# Patient Record
Sex: FEMALE | Race: WHITE | Employment: OTHER | URBAN - METROPOLITAN AREA
[De-identification: names, ages, dates, MRNs, and addresses within clinical notes are randomized per-mention and may not be internally consistent; named-entity substitution may affect disease eponyms.]

---

## 2017-08-17 PROBLEM — E66.9 MODERATE OBESITY: Status: ACTIVE | Noted: 2017-08-17

## 2017-08-17 PROBLEM — E66.8 MODERATE OBESITY: Status: ACTIVE | Noted: 2017-08-17

## 2017-08-18 PROBLEM — J44.9 CHRONIC OBSTRUCTIVE PULMONARY DISEASE (COPD) (HCC): Status: ACTIVE | Noted: 2017-08-18

## 2018-04-12 ENCOUNTER — PREP FOR PROCEDURE (OUTPATIENT)
Dept: SURGERY | Age: 58
End: 2018-04-12

## 2018-04-12 ENCOUNTER — TELEPHONE (OUTPATIENT)
Dept: SURGERY | Age: 58
End: 2018-04-12

## 2018-04-12 ENCOUNTER — INITIAL CONSULT (OUTPATIENT)
Dept: SURGERY | Age: 58
End: 2018-04-12
Payer: MEDICARE

## 2018-04-12 VITALS
HEART RATE: 70 BPM | TEMPERATURE: 98.8 F | WEIGHT: 236 LBS | SYSTOLIC BLOOD PRESSURE: 112 MMHG | RESPIRATION RATE: 12 BRPM | DIASTOLIC BLOOD PRESSURE: 62 MMHG | HEIGHT: 69 IN | BODY MASS INDEX: 34.96 KG/M2

## 2018-04-12 DIAGNOSIS — R10.13 EPIGASTRIC PAIN: Primary | ICD-10-CM

## 2018-04-12 PROCEDURE — G8427 DOCREV CUR MEDS BY ELIG CLIN: HCPCS | Performed by: SURGERY

## 2018-04-12 PROCEDURE — 3014F SCREEN MAMMO DOC REV: CPT | Performed by: SURGERY

## 2018-04-12 PROCEDURE — 4004F PT TOBACCO SCREEN RCVD TLK: CPT | Performed by: SURGERY

## 2018-04-12 PROCEDURE — G8417 CALC BMI ABV UP PARAM F/U: HCPCS | Performed by: SURGERY

## 2018-04-12 PROCEDURE — 99204 OFFICE O/P NEW MOD 45 MIN: CPT | Performed by: SURGERY

## 2018-04-12 PROCEDURE — 3017F COLORECTAL CA SCREEN DOC REV: CPT | Performed by: SURGERY

## 2018-04-12 RX ORDER — CELECOXIB 100 MG/1
CAPSULE ORAL
Refills: 0 | COMMUNITY
Start: 2018-04-06 | End: 2019-07-29

## 2018-04-12 RX ORDER — SODIUM CHLORIDE, SODIUM LACTATE, POTASSIUM CHLORIDE, CALCIUM CHLORIDE 600; 310; 30; 20 MG/100ML; MG/100ML; MG/100ML; MG/100ML
INJECTION, SOLUTION INTRAVENOUS CONTINUOUS
Status: CANCELLED | OUTPATIENT
Start: 2018-04-12

## 2018-04-12 RX ORDER — 0.9 % SODIUM CHLORIDE 0.9 %
10 VIAL (ML) INJECTION PRN
Status: CANCELLED | OUTPATIENT
Start: 2018-04-12

## 2018-04-12 RX ORDER — AMLODIPINE BESYLATE AND BENAZEPRIL HYDROCHLORIDE 5; 40 MG/1; MG/1
CAPSULE ORAL
Refills: 0 | COMMUNITY
Start: 2018-04-06 | End: 2021-07-13 | Stop reason: ALTCHOICE

## 2018-04-12 RX ORDER — 0.9 % SODIUM CHLORIDE 0.9 %
10 VIAL (ML) INJECTION EVERY 12 HOURS SCHEDULED
Status: CANCELLED | OUTPATIENT
Start: 2018-04-12

## 2018-04-13 ENCOUNTER — TELEPHONE (OUTPATIENT)
Dept: SURGERY | Age: 58
End: 2018-04-13

## 2018-04-16 RX ORDER — TIOTROPIUM BROMIDE INHALATION SPRAY 3.12 UG/1
SPRAY, METERED RESPIRATORY (INHALATION)
Refills: 5 | COMMUNITY
Start: 2018-03-01 | End: 2019-07-29

## 2018-04-18 ENCOUNTER — HOSPITAL ENCOUNTER (OUTPATIENT)
Age: 58
Setting detail: OUTPATIENT SURGERY
Discharge: HOME | End: 2018-04-18
Attending: SURGERY | Admitting: SURGERY
Payer: MEDICARE

## 2018-04-18 ENCOUNTER — ANESTHESIA EVENT (OUTPATIENT)
Dept: ENDOSCOPY | Age: 58
End: 2018-04-18
Payer: MEDICARE

## 2018-04-18 ENCOUNTER — ANESTHESIA (OUTPATIENT)
Dept: ENDOSCOPY | Age: 58
End: 2018-04-18
Payer: MEDICARE

## 2018-04-18 VITALS
HEART RATE: 64 BPM | WEIGHT: 234 LBS | TEMPERATURE: 97.5 F | DIASTOLIC BLOOD PRESSURE: 76 MMHG | OXYGEN SATURATION: 97 % | RESPIRATION RATE: 16 BRPM | SYSTOLIC BLOOD PRESSURE: 150 MMHG | HEIGHT: 69 IN | BODY MASS INDEX: 34.66 KG/M2

## 2018-04-18 VITALS
SYSTOLIC BLOOD PRESSURE: 149 MMHG | DIASTOLIC BLOOD PRESSURE: 94 MMHG | OXYGEN SATURATION: 99 % | RESPIRATION RATE: 16 BRPM

## 2018-04-18 PROCEDURE — 88305 TISSUE EXAM BY PATHOLOGIST: CPT

## 2018-04-18 PROCEDURE — C1773 RET DEV, INSERTABLE: HCPCS | Performed by: SURGERY

## 2018-04-18 PROCEDURE — 7100000010 HC PHASE II RECOVERY - FIRST 15 MIN: Performed by: SURGERY

## 2018-04-18 PROCEDURE — 3700000001 HC ADD 15 MINUTES (ANESTHESIA): Performed by: SURGERY

## 2018-04-18 PROCEDURE — 6360000002 HC RX W HCPCS: Performed by: NURSE ANESTHETIST, CERTIFIED REGISTERED

## 2018-04-18 PROCEDURE — 88342 IMHCHEM/IMCYTCHM 1ST ANTB: CPT

## 2018-04-18 PROCEDURE — 3700000000 HC ANESTHESIA ATTENDED CARE: Performed by: SURGERY

## 2018-04-18 PROCEDURE — 3609012400 HC EGD TRANSORAL BIOPSY SINGLE/MULTIPLE: Performed by: SURGERY

## 2018-04-18 PROCEDURE — 7100000011 HC PHASE II RECOVERY - ADDTL 15 MIN: Performed by: SURGERY

## 2018-04-18 PROCEDURE — 43239 EGD BIOPSY SINGLE/MULTIPLE: CPT | Performed by: SURGERY

## 2018-04-18 PROCEDURE — 2709999900 HC NON-CHARGEABLE SUPPLY: Performed by: SURGERY

## 2018-04-18 PROCEDURE — 2580000003 HC RX 258: Performed by: SURGERY

## 2018-04-18 PROCEDURE — 99024 POSTOP FOLLOW-UP VISIT: CPT | Performed by: SURGERY

## 2018-04-18 RX ORDER — PROPOFOL 10 MG/ML
INJECTION, EMULSION INTRAVENOUS PRN
Status: DISCONTINUED | OUTPATIENT
Start: 2018-04-18 | End: 2018-04-18 | Stop reason: SDUPTHER

## 2018-04-18 RX ORDER — SODIUM CHLORIDE, SODIUM LACTATE, POTASSIUM CHLORIDE, CALCIUM CHLORIDE 600; 310; 30; 20 MG/100ML; MG/100ML; MG/100ML; MG/100ML
INJECTION, SOLUTION INTRAVENOUS CONTINUOUS
Status: DISCONTINUED | OUTPATIENT
Start: 2018-04-18 | End: 2018-04-18 | Stop reason: HOSPADM

## 2018-04-18 RX ORDER — SODIUM CHLORIDE 0.9 % (FLUSH) 0.9 %
10 SYRINGE (ML) INJECTION PRN
Status: DISCONTINUED | OUTPATIENT
Start: 2018-04-18 | End: 2018-04-18 | Stop reason: HOSPADM

## 2018-04-18 RX ORDER — SODIUM CHLORIDE 0.9 % (FLUSH) 0.9 %
10 SYRINGE (ML) INJECTION EVERY 12 HOURS SCHEDULED
Status: DISCONTINUED | OUTPATIENT
Start: 2018-04-18 | End: 2018-04-18 | Stop reason: HOSPADM

## 2018-04-18 RX ADMIN — SODIUM CHLORIDE, POTASSIUM CHLORIDE, SODIUM LACTATE AND CALCIUM CHLORIDE: 600; 310; 30; 20 INJECTION, SOLUTION INTRAVENOUS at 12:12

## 2018-04-18 RX ADMIN — SODIUM CHLORIDE, POTASSIUM CHLORIDE, SODIUM LACTATE AND CALCIUM CHLORIDE: 600; 310; 30; 20 INJECTION, SOLUTION INTRAVENOUS at 11:43

## 2018-04-18 RX ADMIN — PROPOFOL 240 MG: 10 INJECTION, EMULSION INTRAVENOUS at 12:14

## 2018-04-18 ASSESSMENT — PAIN - FUNCTIONAL ASSESSMENT: PAIN_FUNCTIONAL_ASSESSMENT: 0-10

## 2018-04-18 ASSESSMENT — PAIN DESCRIPTION - DESCRIPTORS: DESCRIPTORS: CONSTANT;THROBBING;ACHING

## 2018-04-18 ASSESSMENT — PAIN SCALES - GENERAL: PAINLEVEL_OUTOF10: 0

## 2018-05-03 ENCOUNTER — OFFICE VISIT (OUTPATIENT)
Dept: SURGERY | Age: 58
End: 2018-05-03
Payer: MEDICARE

## 2018-05-03 VITALS
DIASTOLIC BLOOD PRESSURE: 64 MMHG | WEIGHT: 216 LBS | SYSTOLIC BLOOD PRESSURE: 98 MMHG | HEIGHT: 69 IN | BODY MASS INDEX: 31.99 KG/M2 | HEART RATE: 70 BPM

## 2018-05-03 DIAGNOSIS — K29.00 OTHER ACUTE GASTRITIS WITHOUT HEMORRHAGE: Primary | ICD-10-CM

## 2018-05-03 PROCEDURE — G8427 DOCREV CUR MEDS BY ELIG CLIN: HCPCS | Performed by: SURGERY

## 2018-05-03 PROCEDURE — 3017F COLORECTAL CA SCREEN DOC REV: CPT | Performed by: SURGERY

## 2018-05-03 PROCEDURE — 4004F PT TOBACCO SCREEN RCVD TLK: CPT | Performed by: SURGERY

## 2018-05-03 PROCEDURE — 99213 OFFICE O/P EST LOW 20 MIN: CPT | Performed by: SURGERY

## 2018-05-03 PROCEDURE — G8417 CALC BMI ABV UP PARAM F/U: HCPCS | Performed by: SURGERY

## 2018-05-03 RX ORDER — OMEPRAZOLE 20 MG/1
20 CAPSULE, DELAYED RELEASE ORAL 2 TIMES DAILY
Qty: 30 CAPSULE | Refills: 3 | Status: SHIPPED | OUTPATIENT
Start: 2018-05-03 | End: 2018-09-06 | Stop reason: SDUPTHER

## 2018-08-14 ENCOUNTER — TELEPHONE (OUTPATIENT)
Dept: SURGERY | Age: 58
End: 2018-08-14

## 2018-08-14 NOTE — TELEPHONE ENCOUNTER
Phone call placed to patient to reschedule appointment due to scheduling error. Patient not available, message left for patient to please contact our office to reschedule.   Electronically signed by Rogelio Sexton on 8/14/18 at 4:01 PM

## 2018-08-16 NOTE — TELEPHONE ENCOUNTER
Patient called back and wanted to r/s a couple weeks out. Patient states 08/27/18 works best. Patient was rescheduled and expressed understanding.    Electronically signed by Elin Scales MA on 8/16/18 at 12:09 PM

## 2018-09-06 ENCOUNTER — PREP FOR PROCEDURE (OUTPATIENT)
Dept: BARIATRICS/WEIGHT MGMT | Age: 58
End: 2018-09-06

## 2018-09-06 ENCOUNTER — INITIAL CONSULT (OUTPATIENT)
Dept: SURGERY | Age: 58
End: 2018-09-06
Payer: MEDICARE

## 2018-09-06 ENCOUNTER — TELEPHONE (OUTPATIENT)
Dept: SURGERY | Age: 58
End: 2018-09-06

## 2018-09-06 VITALS
WEIGHT: 236 LBS | HEART RATE: 83 BPM | OXYGEN SATURATION: 96 % | BODY MASS INDEX: 34.96 KG/M2 | DIASTOLIC BLOOD PRESSURE: 84 MMHG | HEIGHT: 69 IN | SYSTOLIC BLOOD PRESSURE: 126 MMHG

## 2018-09-06 DIAGNOSIS — D50.9 MICROCYTIC ANEMIA: Primary | ICD-10-CM

## 2018-09-06 PROCEDURE — 99214 OFFICE O/P EST MOD 30 MIN: CPT | Performed by: SURGERY

## 2018-09-06 PROCEDURE — 3017F COLORECTAL CA SCREEN DOC REV: CPT | Performed by: SURGERY

## 2018-09-06 PROCEDURE — G8427 DOCREV CUR MEDS BY ELIG CLIN: HCPCS | Performed by: SURGERY

## 2018-09-06 PROCEDURE — G8417 CALC BMI ABV UP PARAM F/U: HCPCS | Performed by: SURGERY

## 2018-09-06 PROCEDURE — 4004F PT TOBACCO SCREEN RCVD TLK: CPT | Performed by: SURGERY

## 2018-09-06 RX ORDER — SODIUM CHLORIDE, SODIUM LACTATE, POTASSIUM CHLORIDE, CALCIUM CHLORIDE 600; 310; 30; 20 MG/100ML; MG/100ML; MG/100ML; MG/100ML
INJECTION, SOLUTION INTRAVENOUS CONTINUOUS
Status: CANCELLED | OUTPATIENT
Start: 2018-09-06 | End: 2019-09-06

## 2018-09-06 RX ORDER — OMEPRAZOLE 20 MG/1
20 CAPSULE, DELAYED RELEASE ORAL 2 TIMES DAILY
Qty: 30 CAPSULE | Refills: 3 | Status: SHIPPED | OUTPATIENT
Start: 2018-09-06

## 2018-09-06 RX ORDER — 0.9 % SODIUM CHLORIDE 0.9 %
10 VIAL (ML) INJECTION EVERY 12 HOURS SCHEDULED
Status: CANCELLED | OUTPATIENT
Start: 2018-09-06 | End: 2019-09-06

## 2018-09-06 RX ORDER — 0.9 % SODIUM CHLORIDE 0.9 %
10 VIAL (ML) INJECTION PRN
Status: CANCELLED | OUTPATIENT
Start: 2018-09-06 | End: 2019-09-06

## 2018-09-06 NOTE — PROGRESS NOTES
General Surgery History and Physical    Patient's Name/Date of Birth: Max Alva / 1960    Date: September 6, 2018     Surgeon: Daryle Drone M.D.    PCP: Janine Dai MD     Chief Complaint: anemia, microcytic    HPI:   Max Alva is a 62 y.o. female who presents for evaluation of microcytic anemia and some dark stools with normal EGD,no other abdominal pain or issues now.       Past Medical History:   Diagnosis Date    Anxiety     Arthritis     Back pain     Chronic obstructive pulmonary disease (COPD) ([address] 8/18/2017    Depression     Diverticulosis     Hip pain, bilateral     Right worse than left    Hypertension     Moderate obesity 8/17/2017    Osteoarthritis of right hip 7/31/2012    Tobacco abuse        Past Surgical History:   Procedure Laterality Date    COLONOSCOPY  1/9/13    UPPER GASTROINTESTINAL ENDOSCOPY  04/18/2018    bx done antrum    UPPER GASTROINTESTINAL ENDOSCOPY N/A 4/18/2018    EGD BIOPSY performed by Francine Alexander MD at [facility] ENDOSCOPY       Current Outpatient Prescriptions   Medication Sig Dispense Refill    omeprazole (PRILOSEC) 20 MG delayed release capsule Take 1 capsule by mouth 2 times daily 30 capsule 3    magnesium citrate solution Take 296 mLs by mouth 2 times daily for 1 dose 296 mL 0    bisacodyl (BISACODYL) 5 MG EC tablet Take 2 tablets by mouth 2 times daily for 1 day 4 tablet 0    SPIRIVA RESPIMAT 2.5 MCG/ACT AERS inhaler INL 2 PFS PO AT THE SAME TIME QD  5    amLODIPine-benazepril (LOTREL) 5-40 MG per capsule TK 1 C PO QD  0    celecoxib (CELEBREX) 100 MG capsule TK ONE C PO  QD WF  0    BREO ELLIPTA 100-25 MCG/INH AEPB inhaler Inhale 2 puffs into the lungs daily       VENTOLIN  (90 Base) MCG/ACT inhaler Inhale 1 puff into the lungs every 4 hours as needed       vitamin D (ERGOCALCIFEROL) 35006 units CAPS capsule Take 50,000 Units by mouth once a week       buPROPion (WELLBUTRIN XL) 150 MG extended release tablet TK 1 T PO  QAM  3  citalopram (CELEXA) 40 MG tablet TK 1 T PO QD  3     No current facility-administered medications for this visit. Allergies   Allergen Reactions    Latex Rash    Cephalosporins Hives    Other      Black sutures  hand swelled up    Penicillins      Unknown         The patient has a family history that is negative for severe cardiovascular or respiratory issues, negative for reaction to anesthesia.     Social History     Social History    Marital status: Single     Spouse name: N/A    Number of children: N/A    Years of education: N/A     Occupational History    unemployed (house keeper)      Social History Main Topics    Smoking status: Current Every Day Smoker     Packs/day: 0.50     Years: 30.00     Types: Cigarettes    Smokeless tobacco: Never Used    Alcohol use 0.5 oz/week     1 Standard drinks or equivalent per week      Comment: occasionally    Drug use: Yes     Types: Marijuana      Comment: cocain 30 years ago, Marijuana every now and then once every week    Sexual activity: Not on file     Other Topics Concern    Not on file     Social History Narrative    No narrative on file           Review of Systems  Review of Systems -  General ROS: negative for - chills, fatigue or malaise  ENT ROS: negative for - hearing change, nasal congestion or nasal discharge  Allergy and Immunology ROS: negative for - hives, itchy/watery eyes or nasal congestion  Hematological and Lymphatic ROS: negative for - blood clots, blood transfusions, bruising or fatigue  Endocrine ROS: negative for - malaise/lethargy, mood swings, palpitations or polydipsia/polyuria  Respiratory ROS: negative for - sputum changes, stridor, tachypnea or wheezing  Cardiovascular ROS: negative for - irregular heartbeat, loss of consciousness, murmur or orthopnea  Gastrointestinal ROS: negative for - constipation, diarrhea, gas/bloating, heartburn or hematemesis  Genito-Urinary ROS: negative for -  genital discharge, genital

## 2018-09-10 RX ORDER — M-VIT,TX,IRON,MINS/CALC/FOLIC 27MG-0.4MG
1 TABLET ORAL DAILY
COMMUNITY
End: 2019-12-18

## 2018-09-10 RX ORDER — CHLORAL HYDRATE 500 MG
3000 CAPSULE ORAL DAILY
COMMUNITY
End: 2019-11-13

## 2018-09-11 ENCOUNTER — HOSPITAL ENCOUNTER (OUTPATIENT)
Age: 58
Setting detail: OUTPATIENT SURGERY
Discharge: HOME | End: 2018-09-11
Attending: SURGERY | Admitting: SURGERY
Payer: MEDICARE

## 2018-09-11 ENCOUNTER — ANESTHESIA EVENT (OUTPATIENT)
Dept: ENDOSCOPY | Age: 58
End: 2018-09-11
Payer: MEDICARE

## 2018-09-11 ENCOUNTER — ANESTHESIA (OUTPATIENT)
Dept: ENDOSCOPY | Age: 58
End: 2018-09-11
Payer: MEDICARE

## 2018-09-11 VITALS
HEART RATE: 56 BPM | DIASTOLIC BLOOD PRESSURE: 84 MMHG | SYSTOLIC BLOOD PRESSURE: 154 MMHG | RESPIRATION RATE: 18 BRPM | TEMPERATURE: 97.8 F | OXYGEN SATURATION: 98 %

## 2018-09-11 VITALS
SYSTOLIC BLOOD PRESSURE: 154 MMHG | OXYGEN SATURATION: 100 % | DIASTOLIC BLOOD PRESSURE: 110 MMHG | RESPIRATION RATE: 18 BRPM

## 2018-09-11 LAB
AMPHETAMINE SCREEN, URINE: NOT DETECTED
BARBITURATE SCREEN URINE: NOT DETECTED
BENZODIAZEPINE SCREEN, URINE: NOT DETECTED
CANNABINOID SCREEN URINE: POSITIVE
COCAINE METABOLITE SCREEN URINE: NOT DETECTED
METHADONE SCREEN, URINE: NOT DETECTED
OPIATE SCREEN URINE: NOT DETECTED
PHENCYCLIDINE SCREEN URINE: NOT DETECTED
PROPOXYPHENE SCREEN: NOT DETECTED

## 2018-09-11 PROCEDURE — 3700000001 HC ADD 15 MINUTES (ANESTHESIA): Performed by: SURGERY

## 2018-09-11 PROCEDURE — 3700000000 HC ANESTHESIA ATTENDED CARE: Performed by: SURGERY

## 2018-09-11 PROCEDURE — 3609027000 HC COLONOSCOPY: Performed by: SURGERY

## 2018-09-11 PROCEDURE — 7100000011 HC PHASE II RECOVERY - ADDTL 15 MIN: Performed by: SURGERY

## 2018-09-11 PROCEDURE — 7100000010 HC PHASE II RECOVERY - FIRST 15 MIN: Performed by: SURGERY

## 2018-09-11 PROCEDURE — G0480 DRUG TEST DEF 1-7 CLASSES: HCPCS

## 2018-09-11 PROCEDURE — 2709999900 HC NON-CHARGEABLE SUPPLY: Performed by: SURGERY

## 2018-09-11 PROCEDURE — 45378 DIAGNOSTIC COLONOSCOPY: CPT | Performed by: SURGERY

## 2018-09-11 PROCEDURE — 99024 POSTOP FOLLOW-UP VISIT: CPT | Performed by: SURGERY

## 2018-09-11 PROCEDURE — 80307 DRUG TEST PRSMV CHEM ANLYZR: CPT

## 2018-09-11 PROCEDURE — 2580000003 HC RX 258: Performed by: SURGERY

## 2018-09-11 PROCEDURE — 6360000002 HC RX W HCPCS: Performed by: NURSE ANESTHETIST, CERTIFIED REGISTERED

## 2018-09-11 PROCEDURE — C1773 RET DEV, INSERTABLE: HCPCS | Performed by: SURGERY

## 2018-09-11 RX ORDER — 0.9 % SODIUM CHLORIDE 0.9 %
10 VIAL (ML) INJECTION EVERY 12 HOURS SCHEDULED
Status: DISCONTINUED | OUTPATIENT
Start: 2018-09-11 | End: 2018-09-11 | Stop reason: HOSPADM

## 2018-09-11 RX ORDER — 0.9 % SODIUM CHLORIDE 0.9 %
10 VIAL (ML) INJECTION PRN
Status: DISCONTINUED | OUTPATIENT
Start: 2018-09-11 | End: 2018-09-11 | Stop reason: HOSPADM

## 2018-09-11 RX ORDER — PROPOFOL 10 MG/ML
INJECTION, EMULSION INTRAVENOUS CONTINUOUS PRN
Status: DISCONTINUED | OUTPATIENT
Start: 2018-09-11 | End: 2018-09-11 | Stop reason: SDUPTHER

## 2018-09-11 RX ORDER — FENTANYL CITRATE 50 UG/ML
INJECTION, SOLUTION INTRAMUSCULAR; INTRAVENOUS PRN
Status: DISCONTINUED | OUTPATIENT
Start: 2018-09-11 | End: 2018-09-11 | Stop reason: SDUPTHER

## 2018-09-11 RX ORDER — SODIUM CHLORIDE, SODIUM LACTATE, POTASSIUM CHLORIDE, CALCIUM CHLORIDE 600; 310; 30; 20 MG/100ML; MG/100ML; MG/100ML; MG/100ML
INJECTION, SOLUTION INTRAVENOUS CONTINUOUS
Status: DISCONTINUED | OUTPATIENT
Start: 2018-09-11 | End: 2018-09-11 | Stop reason: HOSPADM

## 2018-09-11 RX ADMIN — SODIUM CHLORIDE, POTASSIUM CHLORIDE, SODIUM LACTATE AND CALCIUM CHLORIDE: 600; 310; 30; 20 INJECTION, SOLUTION INTRAVENOUS at 08:35

## 2018-09-11 RX ADMIN — SODIUM CHLORIDE, POTASSIUM CHLORIDE, SODIUM LACTATE AND CALCIUM CHLORIDE: 600; 310; 30; 20 INJECTION, SOLUTION INTRAVENOUS at 10:38

## 2018-09-11 RX ADMIN — FENTANYL CITRATE 100 MCG: 50 INJECTION, SOLUTION INTRAMUSCULAR; INTRAVENOUS at 10:40

## 2018-09-11 RX ADMIN — PROPOFOL 100 MCG/KG/MIN: 10 INJECTION, EMULSION INTRAVENOUS at 10:35

## 2018-09-11 ASSESSMENT — LIFESTYLE VARIABLES: SMOKING_STATUS: 1

## 2018-09-11 NOTE — H&P
negative for - irregular heartbeat, loss of consciousness, murmur or orthopnea  Gastrointestinal ROS: negative for - constipation, diarrhea, gas/bloating, heartburn or hematemesis  Genito-Urinary ROS: negative for -  genital discharge, genital ulcers or hematuria  Musculoskeletal ROS: negative for - gait disturbance, muscle pain or muscular weakness     Physical exam:   /84   Pulse 83   Ht 5' 9\" (1.753 m)   Wt 236 lb (107 kg)   LMP 01/20/2014   SpO2 96%   BMI 34.85 kg/m²   General appearance:  NAD  Head: NCAT, PERRLA, EOMI, red conjunctiva  Neck: supple, no masses  Lungs: CTAB, equal chest rise bilateral  Heart: Reg rate  Abdomen: soft, nontender, nondistended  Skin; no lesions  Gu: no cva tenderness  Extremities: extremities normal, atraumatic, no cyanosis or edema        Assessment:  62 y.o. female with microcytic anemia and dark stools     Plan: Will plan for colonoscopy  Discussed the risk, benefits and alternatives including  bleeding, bowel perforation and the risks of  anesthetic including MI, CVA, sudden death. The patient understands the risks and alternatives. All questions were answered to the patient's satisfaction and they freely signed the consent.  Garret Bishop MD

## 2018-09-11 NOTE — ANESTHESIA PRE PROCEDURE
Intravenous Continuous Rishi Dunn MD        sodium chloride (PF) 0.9 % injection 10 mL  10 mL Intravenous 2 times per day Rishi Dunn MD        sodium chloride (PF) 0.9 % injection 10 mL  10 mL Intravenous PRN Rishi Dunn MD           Allergies: Allergies   Allergen Reactions    Latex Rash    Cephalosporins Hives    Other      Black sutures  hand swelled up    Penicillins      Unknown         Problem List:    Patient Active Problem List   Diagnosis Code    Hip pain, bilateral M25.551, M25.552    Osteoarthritis of right hip M16.11    Tobacco abuse Z72.0    Health care maintenance Z00.00    Moderate obesity E66.8    Chronic obstructive pulmonary disease (COPD) ([facility] J44.9    Epigastric pain R10.13       Past Medical History:        Diagnosis Date    Anxiety     Arthritis     Back pain     Chronic obstructive pulmonary disease (COPD) ([facility] [address] 8/18/2017    Depression     Diverticulosis     Hip pain, bilateral     Right worse than left    Hypertension     Moderate obesity 8/17/2017    Osteoarthritis of right hip 7/31/2012    Tobacco abuse        Past Surgical History:        Procedure Laterality Date    COLONOSCOPY  1/9/13    UPPER GASTROINTESTINAL ENDOSCOPY  04/18/2018    bx done antrum    UPPER GASTROINTESTINAL ENDOSCOPY N/A 4/18/2018    EGD BIOPSY performed by Rishi Dunn MD at [address] History:    Social History   Substance Use Topics    Smoking status: Current Every Day Smoker     Packs/day: 0.50     Years: 30.00     Types: Cigarettes    Smokeless tobacco: Never Used    Alcohol use 0.5 oz/week     1 Standard drinks or equivalent per week      Comment: occasionally                                Ready to quit: Not Answered  Counseling given: Not Answered      Vital Signs (Current): There were no vitals filed for this visit.                                            BP Readings from Last 3 Encounters:   09/06/18 126/84   05/03/18 98/64   04/18/18

## 2018-09-20 LAB — CANNABINOIDS CONF, URINE: 66 NG/ML

## 2018-09-24 ENCOUNTER — TELEPHONE (OUTPATIENT)
Dept: SURGERY | Age: 58
End: 2018-09-24

## 2019-05-11 ENCOUNTER — HOSPITAL ENCOUNTER (OUTPATIENT)
Dept: GENERAL RADIOLOGY | Age: 59
Discharge: HOME | End: 2019-05-13
Payer: MEDICARE

## 2019-05-11 ENCOUNTER — HOSPITAL ENCOUNTER (OUTPATIENT)
Age: 59
Discharge: HOME | End: 2019-05-13
Payer: MEDICARE

## 2019-05-11 DIAGNOSIS — R52 PAIN: ICD-10-CM

## 2019-05-11 DIAGNOSIS — R06.02 SOB (SHORTNESS OF BREATH): ICD-10-CM

## 2019-05-11 PROCEDURE — 73502 X-RAY EXAM HIP UNI 2-3 VIEWS: CPT

## 2019-05-11 PROCEDURE — 70210 X-RAY EXAM OF SINUSES: CPT

## 2019-05-11 PROCEDURE — 71046 X-RAY EXAM CHEST 2 VIEWS: CPT

## 2019-05-21 ENCOUNTER — HOSPITAL ENCOUNTER (OUTPATIENT)
Dept: GENERAL RADIOLOGY | Age: 59
Discharge: HOME | End: 2019-05-23
Payer: MEDICARE

## 2019-05-21 DIAGNOSIS — Z12.31 VISIT FOR SCREENING MAMMOGRAM: ICD-10-CM

## 2019-05-21 PROCEDURE — 77063 BREAST TOMOSYNTHESIS BI: CPT

## 2019-05-22 ENCOUNTER — TELEPHONE (OUTPATIENT)
Dept: ONCOLOGY | Age: 59
End: 2019-05-22

## 2019-05-22 NOTE — TELEPHONE ENCOUNTER
Call to patient in reference to her mammogram performed at [facility] on May 21, 2019. Instructed patient that the radiologist has recommended some additional breast imaging and possible biopsy, in order to make a final determination/result. Informed her that a request for Rx has been faxed to the ordering physician. Patient states she has an appointment with Dr. Padma Guevara tomorrow and with a surgeon on May 28.            Sunita Steward RN, BSN, [address]

## 2019-05-31 ENCOUNTER — HOSPITAL ENCOUNTER (OUTPATIENT)
Dept: GENERAL RADIOLOGY | Age: 59
Discharge: HOME | End: 2019-06-02
Payer: MEDICARE

## 2019-05-31 DIAGNOSIS — N63.20 MASS OF BREAST, LEFT: ICD-10-CM

## 2019-05-31 PROCEDURE — 88305 TISSUE EXAM BY PATHOLOGIST: CPT

## 2019-05-31 PROCEDURE — 2500000003 HC RX 250 WO HCPCS

## 2019-05-31 PROCEDURE — 77065 DX MAMMO INCL CAD UNI: CPT

## 2019-05-31 PROCEDURE — 88342 IMHCHEM/IMCYTCHM 1ST ANTB: CPT

## 2019-05-31 PROCEDURE — 76642 ULTRASOUND BREAST LIMITED: CPT

## 2019-05-31 PROCEDURE — C1894 INTRO/SHEATH, NON-LASER: HCPCS

## 2019-05-31 PROCEDURE — 88360 TUMOR IMMUNOHISTOCHEM/MANUAL: CPT

## 2019-05-31 NOTE — PROGRESS NOTES
Met with patient prior to her breast biopsy. Instructed on breast biopsy procedure. Denies use of blood thinners or aspirin products within the past 5 days. I remained with her during the biopsy to provide instruction and emotional support. She tolerated procedure well. Instructed that results will be available in approximately 3-5 business days, and to follow up with Dr. Jyothi Muro to receive results. Provided with folder containing my contact information, monthly breast self exam card, and post biopsy discharge instructions. Instructed to call me if she has any questions or concerns about her biopsy. Verbalizes understanding.  Gaye, RN, OCN, CN-BN

## 2019-06-12 DIAGNOSIS — C50.912 MALIGNANT NEOPLASM OF LEFT FEMALE BREAST, UNSPECIFIED ESTROGEN RECEPTOR STATUS, UNSPECIFIED SITE OF BREAST (HCC): Primary | ICD-10-CM

## 2019-06-13 ENCOUNTER — TELEPHONE (OUTPATIENT)
Dept: CASE MANAGEMENT | Age: 59
End: 2019-06-13

## 2019-06-13 NOTE — TELEPHONE ENCOUNTER
Patient breast surgery education packet assembled. Nurse Navigator is scheduled to met with patient at new patient appointment on 6/18/2019 with Dr. Felisa Fernandez.

## 2019-06-17 NOTE — PROGRESS NOTES
Subjective:      Patient ID: Wilma Brownlee is a 62 y.o. female. HPI  History and Physical    Patient's Name/Date of Birth: Wilma Brownlee / 1960    Date: 2019      Wilma Brownlee presents for evaluation of newly diagnosed left breast cancer. PCP: Michel Stout MD, Gynecologist: None    The lesion is located in the 2 o'clock position of the left breast. The lesion was discovered by mammogram. The patient has noted a change in BSE since presentation. Patient does not routinely do self breast exams. Patient denies nipple discharge. Patient denies  previous breast biopsy. Breast cancer risk factors include age, gender, menopause after 48, family history of breast and pancreatic cancer. Ashkenazi [place] Ancestry: No.    OBSTETRIC RELATED HISTORY:  Age of menarche was 15. Age of menopause was 64. Patient denies hormonal therapy. Patient is . CANCER SURVEILLANCE HISTORY:  Mammograms: Yes / May 2019  Breast MRI's: No   Breast Biopsies: Yes / May 2019  Colonoscopy: Yes / ~1 year ago  EGD: Yes / ~2-3 Years  GI Polyps: No   Pelvic Exam: Yes / ~ 2 years ago  Pap Smear: Yes / ~ 2 years ago  Dermatology: Yes for an allergy Test ~   Lung screening:  Patient unsure but has seen a pulmonologist ~ 2 years ago      Estimated body mass index is 34.85 kg/m² as calculated from the following:    Height as of 18: 5' 9\" (1.753 m). Weight as of 18: 236 lb (107 kg). Bra Size: 44 DD    Because violence is so common, we ask all our patients: are you in a relationship or do you live with a person who threatens, hurts, or controls you:  Patient denies. Patient drinks significant caffeinated beverages (coffee, tea, and sometimes pop). Patient does smoke cigarettes. Patient does use marijuana for her knee pain. Long-term smoker. COPD. Uses Ventolin inhaler PRN. 30 year history of smoking, currently half pack per day.      19 - Bilateral screening mammogram - [facility]:  TISSUE DENSITY:   The breasts are heterogeneously dense (Type 3 density).       MAMMOGRAM FINDINGS:   In the right breast, no suspicious masses, areas of suspicious architectural distortion, suspicious calcifications, or additional suspicious findings are identified.           Finding 1:   There is an irregular mass measuring 7 mm seen in the posterior upper outer quadrant of the left breast.       IMPRESSION:   Mass in the left breast requires additional evaluation. An ultrasound is recommended with consideration for an ultrasound guided biopsy if the finding is considered suspicious.       =======================================   BI-RADS Category 0:  Incomplete: Need Additional Imaging Evaluation   =======================================       RISK ASSESSMENT:   During this patient's visit, information obtained was used to generate a lifetime risk assessment using the Tyrer-Cuzick model (also called the ANOOP [International Breast Cancer Intervention Study] Breast Cancer Risk Evaluation Tool).     - LIFETIME RISK -   Patient has a Tyrer Cuzick score of 20.3%        05/29/2019 Addendum:     COMPARISON:  The present examination has been compared to prior imaging studies performed at [facility] on 03/23/2016, 05/01/2018 and 06/18/2018. MAMMOGRAM FINDINGS:  Finding 1:  Comparison was made to the previous studies now available. There is no change in the original assessment or recommendation.  =======================================  BI-RADS Category 0: Incomplete: Need Additional Imaging Evaluation        5/31/19 - Left breast ultrasound  - [facility]:  [facility] on 03/23/2016, 05/01/2018 and 06/18/2018.       ULTRASOUND FINDINGS:           Finding 1:   Sonography was performed in the left breast using a radial and anti-radial approach. Additional evaluation was performed for the irregular mass in the left breast, upper outer quadrant seen on 05/21/2019.  On the present examination, there is an irregular Family History   Problem Relation Age of Onset    Diabetes Mother     High Blood Pressure Mother     Cancer Mother         pancreatic    Arthritis Father     High Blood Pressure Father     Heart Disease Father         anurism 52       Social History     Socioeconomic History    Marital status: Single     Spouse name: Not on file    Number of children: Not on file    Years of education: Not on file    Highest education level: Not on file   Occupational History    Occupation: unemployed (house keeper)   Social Needs    Financial resource strain: Not on file    Food insecurity:     Worry: Not on file     Inability: Not on file    Transportation needs:     Medical: Not on file     Non-medical: Not on file   Tobacco Use    Smoking status: Current Every Day Smoker     Packs/day: 0.50     Years: 30.00     Pack years: 15.00     Types: Cigarettes    Smokeless tobacco: Never Used   Substance and Sexual Activity    Alcohol use: Yes     Alcohol/week: 0.5 oz     Types: 1 Standard drinks or equivalent per week     Comment: occasionally    Drug use: Yes     Types: Marijuana     Comment: uses about weekly    Sexual activity: Not on file   Lifestyle    Physical activity:     Days per week: Not on file     Minutes per session: Not on file    Stress: Not on file   Relationships    Social connections:     Talks on phone: Not on file     Gets together: Not on file     Attends Scientologist service: Not on file     Active member of club or organization: Not on file     Attends meetings of clubs or organizations: Not on file     Relationship status: Not on file    Intimate partner violence:     Fear of current or ex partner: Not on file     Emotionally abused: Not on file     Physically abused: Not on file     Forced sexual activity: Not on file   Other Topics Concern    Not on file   Social History Narrative    Not on file       Occupation: Patient does not work. No heavy lifting activities. Enjoys gardening. content normal.             Assessment:      62 y.o. woman who underwent an US guided left breast core biopsy at 2 o'clock position on May 31 , 2019. 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 Comment: The invasive ductal carcinoma is Sam grade 1 (score of 4): glandular differentiation score 1, nuclear pleomorphism score 2, mitotic activity score 1. The DCIS is cribriform type with microcalcifications, lacking necrosis. Immunostain for p63 shows negative staining in the invasive carcinoma. Breast Cancer Marker Studies:  Estrogen Receptors (ER): 100%  Progesterone Receptors (MD):70%          Hormone receptor studies are performed by immunohistochemistry on  formalin-fixed, paraffin-embedded tissue (Roche Benchmark Immunostainer,  [place] anti-ER clone [id number], anti-MD clone [id number], polymer-based detection  chemistry). ER and MD are evaluated based on the percentage of cells  showing nuclear staining with >1% considered positive for each. Her-2/leslie (c-erb B-2):  Negative (Score 0)      5/21/19 - Bilateral screening mammogram - [facility]:  TISSUE DENSITY:   The breasts are heterogeneously dense (Type 3 density).       MAMMOGRAM FINDINGS:   In the right breast, no suspicious masses, areas of suspicious architectural distortion, suspicious calcifications, or additional suspicious findings are identified.           Finding 1:   There is an irregular mass measuring 7 mm seen in the posterior upper outer quadrant of the left breast.       IMPRESSION:   Mass in the left breast requires additional evaluation.    An ultrasound is recommended with consideration for an ultrasound guided biopsy if the finding is considered suspicious.       =======================================   BI-RADS Category 0:  Incomplete: Need Additional Imaging Evaluation

## 2019-06-18 ENCOUNTER — OFFICE VISIT (OUTPATIENT)
Dept: BREAST CENTER | Age: 59
End: 2019-06-18
Payer: MEDICARE

## 2019-06-18 ENCOUNTER — HOSPITAL ENCOUNTER (OUTPATIENT)
Age: 59
Discharge: HOME | End: 2019-06-20
Payer: MEDICARE

## 2019-06-18 ENCOUNTER — HOSPITAL ENCOUNTER (OUTPATIENT)
Dept: GENERAL RADIOLOGY | Age: 59
Discharge: HOME | End: 2019-06-20
Payer: MEDICARE

## 2019-06-18 VITALS
DIASTOLIC BLOOD PRESSURE: 86 MMHG | HEART RATE: 80 BPM | RESPIRATION RATE: 16 BRPM | WEIGHT: 224.3 LBS | OXYGEN SATURATION: 99 % | BODY MASS INDEX: 33.22 KG/M2 | HEIGHT: 69 IN | SYSTOLIC BLOOD PRESSURE: 118 MMHG | TEMPERATURE: 97.5 F

## 2019-06-18 DIAGNOSIS — C50.912 MALIGNANT NEOPLASM OF LEFT FEMALE BREAST, UNSPECIFIED ESTROGEN RECEPTOR STATUS, UNSPECIFIED SITE OF BREAST (HCC): ICD-10-CM

## 2019-06-18 DIAGNOSIS — C50.912 INVASIVE DUCTAL CARCINOMA OF LEFT BREAST (HCC): ICD-10-CM

## 2019-06-18 LAB
ALBUMIN SERPL-MCNC: 4.1 G/DL (ref 3.5–5.2)
ALP BLD-CCNC: 116 U/L (ref 35–104)
ALT SERPL-CCNC: 18 U/L (ref 0–32)
ANION GAP SERPL CALCULATED.3IONS-SCNC: 13 MMOL/L (ref 7–16)
AST SERPL-CCNC: 21 U/L (ref 0–31)
BASOPHILS ABSOLUTE: 0.05 E9/L (ref 0–0.2)
BASOPHILS RELATIVE PERCENT: 0.8 % (ref 0–2)
BILIRUB SERPL-MCNC: 0.5 MG/DL (ref 0–1.2)
BUN BLDV-MCNC: 14 MG/DL (ref 6–20)
CALCIUM SERPL-MCNC: 9.5 MG/DL (ref 8.6–10.2)
CHLORIDE BLD-SCNC: 103 MMOL/L (ref 98–107)
CO2: 23 MMOL/L (ref 22–29)
CREAT SERPL-MCNC: 0.7 MG/DL (ref 0.5–1)
EOSINOPHILS ABSOLUTE: 0.08 E9/L (ref 0.05–0.5)
EOSINOPHILS RELATIVE PERCENT: 1.2 % (ref 0–6)
GFR AFRICAN AMERICAN: >60
GFR NON-AFRICAN AMERICAN: >60 ML/MIN/1.73
GLUCOSE BLD-MCNC: 101 MG/DL (ref 74–99)
HCT VFR BLD CALC: 45 % (ref 34–48)
HEMOGLOBIN: 14.4 G/DL (ref 11.5–15.5)
IMMATURE GRANULOCYTES #: 0.02 E9/L
IMMATURE GRANULOCYTES %: 0.3 % (ref 0–5)
LYMPHOCYTES ABSOLUTE: 1.95 E9/L (ref 1.5–4)
LYMPHOCYTES RELATIVE PERCENT: 29.8 % (ref 20–42)
MCH RBC QN AUTO: 30.9 PG (ref 26–35)
MCHC RBC AUTO-ENTMCNC: 32 % (ref 32–34.5)
MCV RBC AUTO: 96.6 FL (ref 80–99.9)
MONOCYTES ABSOLUTE: 0.53 E9/L (ref 0.1–0.95)
MONOCYTES RELATIVE PERCENT: 8.1 % (ref 2–12)
NEUTROPHILS ABSOLUTE: 3.92 E9/L (ref 1.8–7.3)
NEUTROPHILS RELATIVE PERCENT: 59.8 % (ref 43–80)
PDW BLD-RTO: 13.1 FL (ref 11.5–15)
PLATELET # BLD: 334 E9/L (ref 130–450)
PMV BLD AUTO: 10.1 FL (ref 7–12)
POTASSIUM SERPL-SCNC: 4.4 MMOL/L (ref 3.5–5)
RBC # BLD: 4.66 E12/L (ref 3.5–5.5)
SODIUM BLD-SCNC: 139 MMOL/L (ref 132–146)
TOTAL PROTEIN: 7.4 G/DL (ref 6.4–8.3)
WBC # BLD: 6.6 E9/L (ref 4.5–11.5)

## 2019-06-18 PROCEDURE — 36415 COLL VENOUS BLD VENIPUNCTURE: CPT | Performed by: SURGERY

## 2019-06-18 PROCEDURE — 4004F PT TOBACCO SCREEN RCVD TLK: CPT | Performed by: SURGERY

## 2019-06-18 PROCEDURE — G8427 DOCREV CUR MEDS BY ELIG CLIN: HCPCS | Performed by: SURGERY

## 2019-06-18 PROCEDURE — 99203 OFFICE O/P NEW LOW 30 MIN: CPT | Performed by: SURGERY

## 2019-06-18 PROCEDURE — 80053 COMPREHEN METABOLIC PANEL: CPT

## 2019-06-18 PROCEDURE — G8417 CALC BMI ABV UP PARAM F/U: HCPCS | Performed by: SURGERY

## 2019-06-18 PROCEDURE — 3017F COLORECTAL CA SCREEN DOC REV: CPT | Performed by: SURGERY

## 2019-06-18 PROCEDURE — 99215 OFFICE O/P EST HI 40 MIN: CPT | Performed by: SURGERY

## 2019-06-18 PROCEDURE — 85025 COMPLETE CBC W/AUTO DIFF WBC: CPT

## 2019-06-18 NOTE — LETTER
[facility] Breast  [place] [address]  Phone: [phone]  Fax: [phone]    Todd Lawton MD        June 18, 2019     [facility] [address]    Patient: Gaudencio Barnard  MR Number: <[id number]>  YOB: 1960  Date of Visit: 6/18/2019    Dear Dr. Luigi Siemens LAPPING:    Thank you for the request for consultation for Gaudencio Barnard to me for the evaluation of her left breast cancer. Below are the relevant portions of my assessment and plan of care. Assessment:     62 y.o. woman who underwent an US guided left breast core biopsy at 2 o'clock position on May 31 , 2019. 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 Comment: The invasive ductal carcinoma is Sam grade 1 (score of 4): glandular differentiation score 1, nuclear pleomorphism score 2, mitotic activity score 1. The DCIS is cribriform type with microcalcifications, lacking necrosis. Immunostain for p63 shows negative staining in the invasive carcinoma. Breast Cancer Marker Studies:  Estrogen Receptors (ER): 100%  Progesterone Receptors (ME):70%          Hormone receptor studies are performed by immunohistochemistry on  formalin-fixed, paraffin-embedded tissue (Roche Benchmark Immunostainer,  [place] anti-ER clone [id number], anti-ME clone [id number], polymer-based detection  chemistry). ER and ME are evaluated based on the percentage of cells  showing nuclear staining with >1% considered positive for each.     Her-2/leslie (c-erb B-2):  Negative (Score 0)      5/21/19 - Bilateral screening mammogram - [facility]:  TISSUE DENSITY:   The breasts are heterogeneously dense (Type 3 density).       MAMMOGRAM FINDINGS:   In the right breast, no suspicious masses, areas of suspicious     There is no axillary lymphadenopathy.       IMPRESSION:   Solid mass in the left breast is suspicious. An ultrasound guided biopsy is recommended.       =======================================   BI-RADS Category 4:  Suspicious Abnormality       Clinical stage I good prognosis left breast cancer. Multiple other current medical issues including severe degenerative joint disease right hip, or dentition, daily smoking, COPD. Candidate for conservative therapy. Will place referral to dental clinic for assessment for extractions. Questions answered to patient's satisfaction. Plan:     1) Metastatic workup to include CBC, CMP, completed with this visit. CXR completed previously. 2) Patient has met with our Breast Navigator for information and to receive literature. 3) Smoking cessation urged. 4) She would likely be a candidate for conservative therapy, optimally after dental extractions. 5) We had a discussion of the treatment options for breast cancer including risks, benefits, and recurrence rates for breast conservation therapy versus mastectomy. We discussed the indications and risks of sentinel lymph node biopsy and possibility of axillary lymph node dissection. We also discussed the possible role of systemic and radiation therapy. NCCN guidelines were utilized in our discussion. The patient was given the appropriate contact numbers and will call with any questions or concerns. If you have questions, please do not hesitate to call me. I look forward to following Andre Alanis along with you.     Sincerely,  Bernice Nevarez MD

## 2019-06-18 NOTE — PROGRESS NOTES
Upon educating the patient, she meets criteria for testing of BRCA related mutations implicated in breast and ovarian cancer. This is by virtue of her having a diagnosis of breast cancer combined with either one of the following criteria as described by NCCN guidelines:  Personal history of breast cancer at age 62 + one or more of the following:     Maternal grandmother with breast cancer at age 79  Mother with pancreatic cancer at age 70    Pre-Test Education and Risk Assessment During the patient's first visit, the patient has completed the \"Family History Questionnaire\" along with personal information pertinent to assessing risk factors. This information is used to complete the genetic assessment. Informed Consent Procedures Education along with the information guide \"Hereditary Breast and Ovarian Cancer Syndrome, A Patient's Guide to risk assessment\" is provided to the patient with additional resources listed with the information guide. Informed consent is obtained for all genetic testing, and the limitations and benefits of testing are discussed. The specific type of test to be completed, the cost of the tests, possible test results, and the implications of these results are reviewed with the individual. Written consent is obtained prior to testing. Testing  Confidentiality Standards Privacy is maintained in accordance with institutional guidelines. No patient files are coded. Information obtained is kept in a secure medical record. Any information regarding genetic testing cannot be released without the written consent of the individual.   Testing Genetic testing is coordinated and sent to in-house and outside institutions that are CAP/CLIA approved. Available Testing  Cancer/Syndrome Gene  Breast & Ovarian Cancer BRCA1, BRCA2       Blood specimen obtained with today's visit. Educational brochure given to patient to take home.     After considering the risks, benefits, and limitations, the patient chose to pursue and provided informed consent for the following testing:   Integrated BRACAnalysis with [facility] Hereditary Cancer Update Test

## 2019-06-18 NOTE — COMMUNICATION BODY
Assessment:     62 y.o. woman who underwent an US guided left breast core biopsy at 2 o'clock position on May 31 , 2019. Pathological evaluation completed at [facility]):    Left mass at 2:00, biopsy: Invasive ductal carcinoma admixed with ductal carcinoma in situ (DCIS) with microcalcifications, see comment. Comment: The invasive ductal carcinoma is Brewster grade 1 (score of 4): glandular differentiation score 1, nuclear pleomorphism score 2, mitotic activity score 1. The DCIS is cribriform type with microcalcifications, lacking necrosis. Immunostain for p63 shows negative staining in the invasive carcinoma. Breast Cancer Marker Studies:  Estrogen Receptors (ER): 100%  Progesterone Receptors (CA):70%          Hormone receptor studies are performed by immunohistochemistry on  formalin-fixed, paraffin-embedded tissue (Roche Benchmark Immunostainer,  [place] anti-ER clone [id number], anti-CA clone [id number], polymer-based detection  chemistry). ER and CA are evaluated based on the percentage of cells  showing nuclear staining with >1% considered positive for each. Her-2/leslie (c-erb B-2):  Negative (Score 0)      5/21/19 - Bilateral screening mammogram - [facility]:  TISSUE DENSITY:   The breasts are heterogeneously dense (Type 3 density).       MAMMOGRAM FINDINGS:   In the right breast, no suspicious masses, areas of suspicious architectural distortion, suspicious calcifications, or additional suspicious findings are identified.           Finding 1:   There is an irregular mass measuring 7 mm seen in the posterior upper outer quadrant of the left breast.       IMPRESSION:   Mass in the left breast requires additional evaluation.    An ultrasound is recommended with consideration for an ultrasound guided biopsy if the finding is considered suspicious.       =======================================   BI-RADS Category 0:  Incomplete: Need Additional Imaging Evaluation   =======================================       RISK ASSESSMENT:   During this patient's visit, information obtained was used to generate a lifetime risk assessment using the Tyrer-Cuzick model (also called the ANOOP [International Breast Cancer Intervention Study] Breast Cancer Risk Evaluation Tool).     - LIFETIME RISK -   Patient has a Tyrer Cuzick score of 20.3%        05/29/2019 Addendum:     COMPARISON:  The present examination has been compared to prior imaging studies performed at [facility] on 03/23/2016, 05/01/2018 and 06/18/2018. MAMMOGRAM FINDINGS:  Finding 1:  Comparison was made to the previous studies now available. There is no change in the original assessment or recommendation.  =======================================  BI-RADS Category 0: Incomplete: Need Additional Imaging Evaluation        5/31/19 - Left breast ultrasound  - [facility]:  [facility] on 03/23/2016, 05/01/2018 and 06/18/2018.       ULTRASOUND FINDINGS:   Finding 1:   Sonography was performed in the left breast using a radial and anti-radial approach. Additional evaluation was performed for the irregular mass in the left breast, upper outer quadrant seen on 05/21/2019. On the present examination, there is an irregular    solid mass with angular margins measuring 8 x 6 x 7 mm in the left breast at 2 o'clock located 8 centimeters from the nipple. Internal echogenicity is hypoechoic.       There is no axillary lymphadenopathy.       IMPRESSION:   Solid mass in the left breast is suspicious. An ultrasound guided biopsy is recommended.       =======================================   BI-RADS Category 4:  Suspicious Abnormality       Clinical stage I good prognosis left breast cancer. Multiple other current medical issues including severe degenerative joint disease right hip, or dentition, daily smoking, COPD. Candidate for conservative therapy. Will place referral to dental clinic for assessment for extractions.   Questions answered to patient's satisfaction. Plan:     1) Metastatic workup to include CBC, CMP, completed with this visit. CXR completed previously. 2) Patient has met with our Breast Navigator for information and to receive literature. 3) If indicated outside slides will be obtained and reviewed by Pathology at [facility]. 4) She would likely be a candidate for conservative therapy, optimally after dental extractions. 5) We had a discussion of the treatment options for breast cancer including risks, benefits, and recurrence rates for breast conservation therapy versus mastectomy. We discussed the indications and risks of sentinel lymph node biopsy and possibility of axillary lymph node dissection. We also discussed the possible role of systemic and radiation therapy. NCCN guidelines were utilized in our discussion. The patient was given the appropriate contact numbers and will call with any questions or concerns.

## 2019-06-19 DIAGNOSIS — Z91.89 AT RISK FOR DENTAL PROBLEMS: Primary | ICD-10-CM

## 2019-06-20 ENCOUNTER — TELEPHONE (OUTPATIENT)
Dept: BREAST CENTER | Age: 59
End: 2019-06-20

## 2019-06-20 NOTE — TELEPHONE ENCOUNTER
Left message with [facility] -- awaiting a return call to schedule patient for an appointment

## 2019-06-26 ENCOUNTER — TELEPHONE (OUTPATIENT)
Dept: BREAST CENTER | Age: 59
End: 2019-06-26

## 2019-07-09 ENCOUNTER — TELEPHONE (OUTPATIENT)
Dept: BREAST CENTER | Age: 59
End: 2019-07-09

## 2019-07-17 ENCOUNTER — TELEPHONE (OUTPATIENT)
Dept: BREAST CENTER | Age: 59
End: 2019-07-17

## 2019-07-18 ENCOUNTER — TELEPHONE (OUTPATIENT)
Dept: BREAST CENTER | Age: 59
End: 2019-07-18

## 2019-07-18 DIAGNOSIS — C50.912 INVASIVE DUCTAL CARCINOMA OF LEFT BREAST (HCC): Primary | ICD-10-CM

## 2019-07-18 NOTE — TELEPHONE ENCOUNTER
Patient surgery has been scheduled with surgery scheduling 7/31/19 @ 12:00pm / NL & Nuclear 10:00am / Rogerio Fabian 8:30am - Left breast NL lumpectomy - blue dye inj - left axillary SLN excision - possible left axillary dissection - General - Trident / Neoprobe. Patient notified of date and time of surgery. Patient was instructed to enter the [facility] entrance of the hospital. Patient was instructed NPO after midnight the night prior to surgery. Patient was instructed NO ASA products or blood thinners 5-7 days prior to surgery. Patient instruction and post op instructions sheet mailed to patient. Patient was instructed to bring a sports bra with her day of surgery. Post op visit - 8/26/19 @ 3:00pm [facility]. Prior authorization will be obtained.

## 2019-07-25 ENCOUNTER — TELEPHONE (OUTPATIENT)
Dept: BREAST CENTER | Age: 59
End: 2019-07-25

## 2019-07-31 ENCOUNTER — HOSPITAL ENCOUNTER (OUTPATIENT)
Dept: NUCLEAR MEDICINE | Age: 59
Discharge: HOME | End: 2019-08-02
Payer: MEDICARE

## 2019-07-31 DIAGNOSIS — C50.912 INVASIVE DUCTAL CARCINOMA OF LEFT BREAST (HCC): ICD-10-CM

## 2019-07-31 DIAGNOSIS — C50.912 INVASIVE DUCTAL CARCINOMA OF LEFT BREAST (HCC): Primary | ICD-10-CM

## 2019-08-02 ENCOUNTER — TELEPHONE (OUTPATIENT)
Dept: BREAST CENTER | Age: 59
End: 2019-08-02

## 2019-08-02 NOTE — TELEPHONE ENCOUNTER
Patient surgery has been rescheduled with surgery scheduling 9/4/19 @ 12:00pm / Nl & Nuclear 9:30am / Arrival 8:00am - Left breast NL lumpectomy - blue dye inj - left axillary SLN excision - Possible left axillary dissection - General  - Trident/Neoprobe. Patient notified of date and time of surgery. Patient was instructed to enter the [facility] entrance of the hospital. Patient was instructed NPO after midnight the night prior to surgery. Patient was instructed NO ASA products or blood thinners 5-7 days prior to surgery. Patient instruction and post op instructions sheet mailed to patient. Patient was instructed to bring a sports bra with her day of surgery. No prior authorization required. Post op visit 9/23/19 @ 9:30am [facility]. No prior authorization required.

## 2019-08-08 ENCOUNTER — PREP FOR PROCEDURE (OUTPATIENT)
Dept: SURGERY | Age: 59
End: 2019-08-08

## 2019-08-08 RX ORDER — SODIUM CHLORIDE, SODIUM LACTATE, POTASSIUM CHLORIDE, CALCIUM CHLORIDE 600; 310; 30; 20 MG/100ML; MG/100ML; MG/100ML; MG/100ML
INJECTION, SOLUTION INTRAVENOUS CONTINUOUS
Status: CANCELLED | OUTPATIENT
Start: 2019-08-08

## 2019-08-08 RX ORDER — SODIUM CHLORIDE 0.9 % (FLUSH) 0.9 %
10 SYRINGE (ML) INJECTION PRN
Status: CANCELLED | OUTPATIENT
Start: 2019-08-08

## 2019-08-08 RX ORDER — SODIUM CHLORIDE 0.9 % (FLUSH) 0.9 %
10 SYRINGE (ML) INJECTION EVERY 12 HOURS SCHEDULED
Status: CANCELLED | OUTPATIENT
Start: 2019-08-08

## 2019-08-29 ENCOUNTER — HOSPITAL ENCOUNTER (OUTPATIENT)
Age: 59
Discharge: HOME | End: 2019-08-29
Payer: MEDICARE

## 2019-08-29 ENCOUNTER — OFFICE VISIT (OUTPATIENT)
Dept: BREAST CENTER | Age: 59
End: 2019-08-29
Payer: MEDICARE

## 2019-08-29 VITALS
HEIGHT: 69 IN | WEIGHT: 227 LBS | BODY MASS INDEX: 33.62 KG/M2 | HEART RATE: 63 BPM | TEMPERATURE: 97.9 F | OXYGEN SATURATION: 99 % | SYSTOLIC BLOOD PRESSURE: 132 MMHG | RESPIRATION RATE: 16 BRPM | DIASTOLIC BLOOD PRESSURE: 78 MMHG

## 2019-08-29 DIAGNOSIS — C50.912 INVASIVE DUCTAL CARCINOMA OF LEFT BREAST (HCC): ICD-10-CM

## 2019-08-29 LAB
EKG ATRIAL RATE: 68 BPM
EKG P AXIS: 72 DEGREES
EKG P-R INTERVAL: 148 MS
EKG Q-T INTERVAL: 414 MS
EKG QRS DURATION: 86 MS
EKG QTC CALCULATION (BAZETT): 440 MS
EKG R AXIS: 46 DEGREES
EKG T AXIS: 49 DEGREES
EKG VENTRICULAR RATE: 68 BPM

## 2019-08-29 PROCEDURE — 93005 ELECTROCARDIOGRAM TRACING: CPT | Performed by: SURGERY

## 2019-08-29 PROCEDURE — G8417 CALC BMI ABV UP PARAM F/U: HCPCS | Performed by: NURSE PRACTITIONER

## 2019-08-29 PROCEDURE — 93010 ELECTROCARDIOGRAM REPORT: CPT | Performed by: INTERNAL MEDICINE

## 2019-08-29 PROCEDURE — 99211 OFF/OP EST MAY X REQ PHY/QHP: CPT | Performed by: NURSE PRACTITIONER

## 2019-08-29 PROCEDURE — 99213 OFFICE O/P EST LOW 20 MIN: CPT | Performed by: NURSE PRACTITIONER

## 2019-08-29 PROCEDURE — G8427 DOCREV CUR MEDS BY ELIG CLIN: HCPCS | Performed by: NURSE PRACTITIONER

## 2019-08-31 ENCOUNTER — APPOINTMENT (OUTPATIENT)
Dept: GENERAL RADIOLOGY | Age: 59
End: 2019-08-31
Payer: MEDICARE

## 2019-08-31 ENCOUNTER — HOSPITAL ENCOUNTER (EMERGENCY)
Age: 59
Discharge: HOME | End: 2019-08-31
Payer: MEDICARE

## 2019-08-31 VITALS
HEART RATE: 70 BPM | OXYGEN SATURATION: 100 % | BODY MASS INDEX: 33.62 KG/M2 | HEIGHT: 69 IN | SYSTOLIC BLOOD PRESSURE: 149 MMHG | RESPIRATION RATE: 16 BRPM | DIASTOLIC BLOOD PRESSURE: 83 MMHG | TEMPERATURE: 97.7 F | WEIGHT: 227 LBS

## 2019-08-31 DIAGNOSIS — S40.029A CONTUSION, SHOULDER AND UPPER ARM, MULTIPLE SITES, UNSPECIFIED LATERALITY, INITIAL ENCOUNTER: Primary | ICD-10-CM

## 2019-08-31 DIAGNOSIS — M25.551 RIGHT HIP PAIN: ICD-10-CM

## 2019-08-31 DIAGNOSIS — S40.019A CONTUSION, SHOULDER AND UPPER ARM, MULTIPLE SITES, UNSPECIFIED LATERALITY, INITIAL ENCOUNTER: Primary | ICD-10-CM

## 2019-08-31 DIAGNOSIS — T07.XXXA MULTIPLE ABRASIONS: ICD-10-CM

## 2019-08-31 PROCEDURE — 90715 TDAP VACCINE 7 YRS/> IM: CPT | Performed by: NURSE PRACTITIONER

## 2019-08-31 PROCEDURE — 90471 IMMUNIZATION ADMIN: CPT | Performed by: NURSE PRACTITIONER

## 2019-08-31 PROCEDURE — 99283 EMERGENCY DEPT VISIT LOW MDM: CPT

## 2019-08-31 PROCEDURE — 73030 X-RAY EXAM OF SHOULDER: CPT

## 2019-08-31 PROCEDURE — 73502 X-RAY EXAM HIP UNI 2-3 VIEWS: CPT

## 2019-08-31 PROCEDURE — 6360000002 HC RX W HCPCS: Performed by: NURSE PRACTITIONER

## 2019-08-31 PROCEDURE — 73610 X-RAY EXAM OF ANKLE: CPT

## 2019-08-31 PROCEDURE — 73080 X-RAY EXAM OF ELBOW: CPT

## 2019-08-31 PROCEDURE — 73560 X-RAY EXAM OF KNEE 1 OR 2: CPT

## 2019-08-31 PROCEDURE — 6370000000 HC RX 637 (ALT 250 FOR IP): Performed by: NURSE PRACTITIONER

## 2019-08-31 RX ORDER — ACETAMINOPHEN 500 MG
1000 TABLET ORAL ONCE
Status: COMPLETED | OUTPATIENT
Start: 2019-08-31 | End: 2019-08-31

## 2019-08-31 RX ORDER — HYDROCODONE BITARTRATE AND ACETAMINOPHEN 5; 325 MG/1; MG/1
1 TABLET ORAL EVERY 8 HOURS PRN
Qty: 9 TABLET | Refills: 0 | Status: SHIPPED | OUTPATIENT
Start: 2019-08-31 | End: 2019-09-03

## 2019-08-31 RX ADMIN — TETANUS TOXOID, REDUCED DIPHTHERIA TOXOID AND ACELLULAR PERTUSSIS VACCINE, ADSORBED 0.5 ML: 5; 2.5; 8; 8; 2.5 SUSPENSION INTRAMUSCULAR at 12:46

## 2019-08-31 RX ADMIN — ACETAMINOPHEN 1000 MG: 500 TABLET, FILM COATED ORAL at 12:46

## 2019-08-31 ASSESSMENT — PAIN SCALES - GENERAL
PAINLEVEL_OUTOF10: 10

## 2019-08-31 ASSESSMENT — PAIN DESCRIPTION - PROGRESSION: CLINICAL_PROGRESSION: GRADUALLY WORSENING

## 2019-08-31 ASSESSMENT — PAIN DESCRIPTION - PAIN TYPE
TYPE: ACUTE PAIN
TYPE: ACUTE PAIN

## 2019-08-31 ASSESSMENT — PAIN DESCRIPTION - ORIENTATION
ORIENTATION: RIGHT
ORIENTATION: RIGHT

## 2019-08-31 ASSESSMENT — PAIN DESCRIPTION - LOCATION
LOCATION: HIP
LOCATION: LEG;ARM;HIP

## 2019-08-31 ASSESSMENT — PAIN DESCRIPTION - DESCRIPTORS: DESCRIPTORS: THROBBING

## 2019-08-31 NOTE — ED PROVIDER NOTES
3 days. Take lowest dose possible to manage pain. Use caution as may cause drowsiness or sedation. Do not operate machinery, take while working, drive or drink alc ohol while taking., Disp-9 tablet, R-0Print           Electronically signed by BRAD Paulson CNP   DD: 8/31/19  **This report was transcribed using voice recognition software. Every effort was made to ensure accuracy; however, inadvertent computerized transcription errors may be present.   END OF ED PROVIDER NOTE        BRAD Paulson CNP  09/01/19 [id number]

## 2019-09-16 DIAGNOSIS — C50.912 INVASIVE DUCTAL CARCINOMA OF LEFT BREAST (HCC): Primary | ICD-10-CM

## 2019-09-17 ENCOUNTER — TELEPHONE (OUTPATIENT)
Dept: BREAST CENTER | Age: 59
End: 2019-09-17

## 2019-09-17 NOTE — TELEPHONE ENCOUNTER
Patient surgery has been rescheduled with surgery scheduling 10/18/19@ 11:30am / Nl & Nuclear 9:00am / Arrival 7:30am - Left breast NL lumpectomy - blue dye inj - left axillary SLN excision - Possible left axillary dissection - General  - Trident/Neoprobe. Patient notified of date and time of surgery. Patient was instructed to enter the [facility] entrance of the hospital. Patient was instructed NPO after midnight the night prior to surgery. Patient was instructed NO ASA products or blood thinners 5-7 days prior to surgery. Patient instruction and post op instructions sheet mailed to patient. Patient was instructed to bring a sports bra with her day of surgery. No prior authorization required. Post op visit 11/4/19 @ 2:30pm [facility]. No prior authorization required.

## 2019-09-19 ENCOUNTER — PREP FOR PROCEDURE (OUTPATIENT)
Dept: BREAST CENTER | Age: 59
End: 2019-09-19

## 2019-09-19 RX ORDER — SODIUM CHLORIDE 0.9 % (FLUSH) 0.9 %
10 SYRINGE (ML) INJECTION PRN
Status: CANCELLED | OUTPATIENT
Start: 2019-09-19

## 2019-09-19 RX ORDER — SODIUM CHLORIDE, SODIUM LACTATE, POTASSIUM CHLORIDE, CALCIUM CHLORIDE 600; 310; 30; 20 MG/100ML; MG/100ML; MG/100ML; MG/100ML
INJECTION, SOLUTION INTRAVENOUS CONTINUOUS
Status: CANCELLED | OUTPATIENT
Start: 2019-09-19

## 2019-09-19 RX ORDER — SODIUM CHLORIDE 0.9 % (FLUSH) 0.9 %
10 SYRINGE (ML) INJECTION EVERY 12 HOURS SCHEDULED
Status: CANCELLED | OUTPATIENT
Start: 2019-09-19

## 2019-10-18 ENCOUNTER — APPOINTMENT (OUTPATIENT)
Dept: GENERAL RADIOLOGY | Age: 59
End: 2019-10-18
Attending: SURGERY
Payer: MEDICARE

## 2019-10-18 ENCOUNTER — HOSPITAL ENCOUNTER (OUTPATIENT)
Age: 59
Setting detail: OUTPATIENT SURGERY
Discharge: HOME | End: 2019-10-18
Attending: SURGERY | Admitting: SURGERY
Payer: MEDICARE

## 2019-10-18 ENCOUNTER — HOSPITAL ENCOUNTER (OUTPATIENT)
Dept: NUCLEAR MEDICINE | Age: 59
Discharge: HOME | End: 2019-10-20
Payer: MEDICARE

## 2019-10-18 ENCOUNTER — ANESTHESIA (OUTPATIENT)
Dept: OPERATING ROOM | Age: 59
End: 2019-10-18
Payer: MEDICARE

## 2019-10-18 ENCOUNTER — ANESTHESIA EVENT (OUTPATIENT)
Dept: OPERATING ROOM | Age: 59
End: 2019-10-18
Payer: MEDICARE

## 2019-10-18 ENCOUNTER — HOSPITAL ENCOUNTER (OUTPATIENT)
Dept: GENERAL RADIOLOGY | Age: 59
Setting detail: OUTPATIENT SURGERY
Discharge: HOME | End: 2019-10-20
Attending: SURGERY
Payer: MEDICARE

## 2019-10-18 VITALS
HEART RATE: 77 BPM | OXYGEN SATURATION: 96 % | TEMPERATURE: 97.3 F | WEIGHT: 226 LBS | BODY MASS INDEX: 33.47 KG/M2 | DIASTOLIC BLOOD PRESSURE: 78 MMHG | SYSTOLIC BLOOD PRESSURE: 136 MMHG | HEIGHT: 69 IN | RESPIRATION RATE: 16 BRPM

## 2019-10-18 VITALS — OXYGEN SATURATION: 90 % | SYSTOLIC BLOOD PRESSURE: 172 MMHG | DIASTOLIC BLOOD PRESSURE: 151 MMHG

## 2019-10-18 DIAGNOSIS — Z01.812 PRE-OPERATIVE LABORATORY EXAMINATION: Primary | ICD-10-CM

## 2019-10-18 DIAGNOSIS — C50.912 MALIGNANT NEOPLASM OF LEFT FEMALE BREAST, UNSPECIFIED ESTROGEN RECEPTOR STATUS, UNSPECIFIED SITE OF BREAST (HCC): ICD-10-CM

## 2019-10-18 DIAGNOSIS — C50.912 INVASIVE DUCTAL CARCINOMA OF LEFT BREAST (HCC): ICD-10-CM

## 2019-10-18 LAB
HCT VFR BLD CALC: 40.9 % (ref 34–48)
HEMOGLOBIN: 13.4 G/DL (ref 11.5–15.5)
MCH RBC QN AUTO: 31.2 PG (ref 26–35)
MCHC RBC AUTO-ENTMCNC: 32.8 % (ref 32–34.5)
MCV RBC AUTO: 95.1 FL (ref 80–99.9)
PDW BLD-RTO: 12.5 FL (ref 11.5–15)
PLATELET # BLD: 283 E9/L (ref 130–450)
PMV BLD AUTO: 9.3 FL (ref 7–12)
RBC # BLD: 4.3 E12/L (ref 3.5–5.5)
WBC # BLD: 6.2 E9/L (ref 4.5–11.5)

## 2019-10-18 PROCEDURE — 38525 BIOPSY/REMOVAL LYMPH NODES: CPT | Performed by: SURGERY

## 2019-10-18 PROCEDURE — 19301 PARTIAL MASTECTOMY: CPT | Performed by: SURGERY

## 2019-10-18 PROCEDURE — 2580000003 HC RX 258: Performed by: SURGERY

## 2019-10-18 PROCEDURE — 2500000003 HC RX 250 WO HCPCS

## 2019-10-18 PROCEDURE — 2500000003 HC RX 250 WO HCPCS: Performed by: ANESTHESIOLOGIST ASSISTANT

## 2019-10-18 PROCEDURE — 6360000002 HC RX W HCPCS: Performed by: SURGERY

## 2019-10-18 PROCEDURE — 2580000003 HC RX 258: Performed by: ANESTHESIOLOGIST ASSISTANT

## 2019-10-18 PROCEDURE — 88307 TISSUE EXAM BY PATHOLOGIST: CPT

## 2019-10-18 PROCEDURE — 7100000010 HC PHASE II RECOVERY - FIRST 15 MIN: Performed by: SURGERY

## 2019-10-18 PROCEDURE — 2709999900 MAM NEEDLE BREAST LOCALIZATION LEFT

## 2019-10-18 PROCEDURE — 85027 COMPLETE CBC AUTOMATED: CPT

## 2019-10-18 PROCEDURE — A9541 TC99M SULFUR COLLOID: HCPCS | Performed by: RADIOLOGY

## 2019-10-18 PROCEDURE — 3700000001 HC ADD 15 MINUTES (ANESTHESIA): Performed by: SURGERY

## 2019-10-18 PROCEDURE — 2500000003 HC RX 250 WO HCPCS: Performed by: SURGERY

## 2019-10-18 PROCEDURE — 38900 IO MAP OF SENT LYMPH NODE: CPT | Performed by: SURGERY

## 2019-10-18 PROCEDURE — 3600000013 HC SURGERY LEVEL 3 ADDTL 15MIN: Performed by: SURGERY

## 2019-10-18 PROCEDURE — 7100000000 HC PACU RECOVERY - FIRST 15 MIN: Performed by: SURGERY

## 2019-10-18 PROCEDURE — 3600000003 HC SURGERY LEVEL 3 BASE: Performed by: SURGERY

## 2019-10-18 PROCEDURE — 36415 COLL VENOUS BLD VENIPUNCTURE: CPT

## 2019-10-18 PROCEDURE — 2709999900 HC NON-CHARGEABLE SUPPLY: Performed by: SURGERY

## 2019-10-18 PROCEDURE — 7100000011 HC PHASE II RECOVERY - ADDTL 15 MIN: Performed by: SURGERY

## 2019-10-18 PROCEDURE — 7100000001 HC PACU RECOVERY - ADDTL 15 MIN: Performed by: SURGERY

## 2019-10-18 PROCEDURE — 38792 RA TRACER ID OF SENTINL NODE: CPT

## 2019-10-18 PROCEDURE — 3430000000 HC RX DIAGNOSTIC RADIOPHARMACEUTICAL: Performed by: RADIOLOGY

## 2019-10-18 PROCEDURE — 3700000000 HC ANESTHESIA ATTENDED CARE: Performed by: SURGERY

## 2019-10-18 PROCEDURE — 6360000002 HC RX W HCPCS: Performed by: ANESTHESIOLOGY

## 2019-10-18 PROCEDURE — 6360000002 HC RX W HCPCS: Performed by: ANESTHESIOLOGIST ASSISTANT

## 2019-10-18 PROCEDURE — 76098 X-RAY EXAM SURGICAL SPECIMEN: CPT

## 2019-10-18 PROCEDURE — 2720000010 HC SURG SUPPLY STERILE: Performed by: SURGERY

## 2019-10-18 RX ORDER — FENTANYL CITRATE 50 UG/ML
INJECTION, SOLUTION INTRAMUSCULAR; INTRAVENOUS PRN
Status: DISCONTINUED | OUTPATIENT
Start: 2019-10-18 | End: 2019-10-18 | Stop reason: SDUPTHER

## 2019-10-18 RX ORDER — HYDROCODONE BITARTRATE AND ACETAMINOPHEN 5; 325 MG/1; MG/1
1 TABLET ORAL
Status: DISCONTINUED | OUTPATIENT
Start: 2019-10-18 | End: 2019-10-18 | Stop reason: HOSPADM

## 2019-10-18 RX ORDER — GLYCOPYRROLATE 1 MG/5 ML
SYRINGE (ML) INTRAVENOUS PRN
Status: DISCONTINUED | OUTPATIENT
Start: 2019-10-18 | End: 2019-10-18 | Stop reason: SDUPTHER

## 2019-10-18 RX ORDER — PROPOFOL 10 MG/ML
INJECTION, EMULSION INTRAVENOUS PRN
Status: DISCONTINUED | OUTPATIENT
Start: 2019-10-18 | End: 2019-10-18 | Stop reason: SDUPTHER

## 2019-10-18 RX ORDER — NEOSTIGMINE METHYLSULFATE 1 MG/ML
INJECTION, SOLUTION INTRAVENOUS PRN
Status: DISCONTINUED | OUTPATIENT
Start: 2019-10-18 | End: 2019-10-18 | Stop reason: SDUPTHER

## 2019-10-18 RX ORDER — DEXAMETHASONE SODIUM PHOSPHATE 10 MG/ML
INJECTION INTRAMUSCULAR; INTRAVENOUS PRN
Status: DISCONTINUED | OUTPATIENT
Start: 2019-10-18 | End: 2019-10-18 | Stop reason: SDUPTHER

## 2019-10-18 RX ORDER — MEPERIDINE HYDROCHLORIDE 50 MG/ML
12.5 INJECTION INTRAMUSCULAR; INTRAVENOUS; SUBCUTANEOUS EVERY 5 MIN PRN
Status: DISCONTINUED | OUTPATIENT
Start: 2019-10-18 | End: 2019-10-18 | Stop reason: HOSPADM

## 2019-10-18 RX ORDER — BUPIVACAINE HYDROCHLORIDE AND EPINEPHRINE 5; 5 MG/ML; UG/ML
INJECTION, SOLUTION EPIDURAL; INTRACAUDAL; PERINEURAL PRN
Status: DISCONTINUED | OUTPATIENT
Start: 2019-10-18 | End: 2019-10-18 | Stop reason: ALTCHOICE

## 2019-10-18 RX ORDER — LIDOCAINE HYDROCHLORIDE 20 MG/ML
INJECTION, SOLUTION INTRAVENOUS PRN
Status: DISCONTINUED | OUTPATIENT
Start: 2019-10-18 | End: 2019-10-18 | Stop reason: SDUPTHER

## 2019-10-18 RX ORDER — SODIUM CHLORIDE 0.9 % (FLUSH) 0.9 %
10 SYRINGE (ML) INJECTION EVERY 12 HOURS SCHEDULED
Status: DISCONTINUED | OUTPATIENT
Start: 2019-10-18 | End: 2019-10-18 | Stop reason: HOSPADM

## 2019-10-18 RX ORDER — HYDROCODONE BITARTRATE AND ACETAMINOPHEN 5; 325 MG/1; MG/1
1 TABLET ORAL EVERY 6 HOURS PRN
Qty: 5 TABLET | Refills: 0 | Status: SHIPPED | OUTPATIENT
Start: 2019-10-18 | End: 2019-10-21

## 2019-10-18 RX ORDER — KETOROLAC TROMETHAMINE 30 MG/ML
INJECTION, SOLUTION INTRAMUSCULAR; INTRAVENOUS PRN
Status: DISCONTINUED | OUTPATIENT
Start: 2019-10-18 | End: 2019-10-18 | Stop reason: SDUPTHER

## 2019-10-18 RX ORDER — SODIUM CHLORIDE, SODIUM LACTATE, POTASSIUM CHLORIDE, CALCIUM CHLORIDE 600; 310; 30; 20 MG/100ML; MG/100ML; MG/100ML; MG/100ML
INJECTION, SOLUTION INTRAVENOUS CONTINUOUS
Status: DISCONTINUED | OUTPATIENT
Start: 2019-10-18 | End: 2019-10-18 | Stop reason: HOSPADM

## 2019-10-18 RX ORDER — ROCURONIUM BROMIDE 10 MG/ML
INJECTION, SOLUTION INTRAVENOUS PRN
Status: DISCONTINUED | OUTPATIENT
Start: 2019-10-18 | End: 2019-10-18 | Stop reason: SDUPTHER

## 2019-10-18 RX ORDER — PROMETHAZINE HYDROCHLORIDE 25 MG/ML
6.25 INJECTION, SOLUTION INTRAMUSCULAR; INTRAVENOUS
Status: DISCONTINUED | OUTPATIENT
Start: 2019-10-18 | End: 2019-10-18 | Stop reason: HOSPADM

## 2019-10-18 RX ORDER — ONDANSETRON 2 MG/ML
INJECTION INTRAMUSCULAR; INTRAVENOUS PRN
Status: DISCONTINUED | OUTPATIENT
Start: 2019-10-18 | End: 2019-10-18 | Stop reason: SDUPTHER

## 2019-10-18 RX ORDER — LABETALOL HYDROCHLORIDE 5 MG/ML
5 INJECTION, SOLUTION INTRAVENOUS EVERY 10 MIN PRN
Status: DISCONTINUED | OUTPATIENT
Start: 2019-10-18 | End: 2019-10-18 | Stop reason: HOSPADM

## 2019-10-18 RX ORDER — CLINDAMYCIN PHOSPHATE 900 MG/50ML
900 INJECTION INTRAVENOUS
Status: DISCONTINUED | OUTPATIENT
Start: 2019-10-18 | End: 2019-10-18 | Stop reason: HOSPADM

## 2019-10-18 RX ORDER — HYDROMORPHONE HCL 110MG/55ML
PATIENT CONTROLLED ANALGESIA SYRINGE INTRAVENOUS PRN
Status: DISCONTINUED | OUTPATIENT
Start: 2019-10-18 | End: 2019-10-18 | Stop reason: SDUPTHER

## 2019-10-18 RX ORDER — HYDRALAZINE HYDROCHLORIDE 20 MG/ML
5 INJECTION INTRAMUSCULAR; INTRAVENOUS EVERY 10 MIN PRN
Status: DISCONTINUED | OUTPATIENT
Start: 2019-10-18 | End: 2019-10-18 | Stop reason: HOSPADM

## 2019-10-18 RX ORDER — MIDAZOLAM HYDROCHLORIDE 1 MG/ML
INJECTION INTRAMUSCULAR; INTRAVENOUS PRN
Status: DISCONTINUED | OUTPATIENT
Start: 2019-10-18 | End: 2019-10-18 | Stop reason: SDUPTHER

## 2019-10-18 RX ORDER — SODIUM CHLORIDE 0.9 % (FLUSH) 0.9 %
10 SYRINGE (ML) INJECTION PRN
Status: DISCONTINUED | OUTPATIENT
Start: 2019-10-18 | End: 2019-10-18 | Stop reason: HOSPADM

## 2019-10-18 RX ORDER — METHYLENE BLUE 10 MG/ML
INJECTION INTRAVENOUS PRN
Status: DISCONTINUED | OUTPATIENT
Start: 2019-10-18 | End: 2019-10-18 | Stop reason: ALTCHOICE

## 2019-10-18 RX ORDER — SODIUM CHLORIDE 9 MG/ML
INJECTION, SOLUTION INTRAVENOUS CONTINUOUS PRN
Status: DISCONTINUED | OUTPATIENT
Start: 2019-10-18 | End: 2019-10-18 | Stop reason: SDUPTHER

## 2019-10-18 RX ORDER — SODIUM CHLORIDE 0.9 % (FLUSH) 0.9 %
SYRINGE (ML) INJECTION PRN
Status: DISCONTINUED | OUTPATIENT
Start: 2019-10-18 | End: 2019-10-18 | Stop reason: ALTCHOICE

## 2019-10-18 RX ADMIN — HYDROMORPHONE HYDROCHLORIDE 0.5 MG: 2 INJECTION, SOLUTION INTRAMUSCULAR; INTRAVENOUS; SUBCUTANEOUS at 12:43

## 2019-10-18 RX ADMIN — PHENYLEPHRINE HYDROCHLORIDE 100 MCG: 10 INJECTION INTRAVENOUS at 11:10

## 2019-10-18 RX ADMIN — PROPOFOL 150 MG: 10 INJECTION, EMULSION INTRAVENOUS at 10:50

## 2019-10-18 RX ADMIN — Medication 0.3 MG: at 12:29

## 2019-10-18 RX ADMIN — PROPOFOL 50 MG: 10 INJECTION, EMULSION INTRAVENOUS at 11:23

## 2019-10-18 RX ADMIN — SODIUM CHLORIDE: 9 INJECTION, SOLUTION INTRAVENOUS at 11:34

## 2019-10-18 RX ADMIN — ROCURONIUM BROMIDE 30 MG: 10 INJECTION, SOLUTION INTRAVENOUS at 11:23

## 2019-10-18 RX ADMIN — FENTANYL CITRATE 150 MCG: 50 INJECTION, SOLUTION INTRAMUSCULAR; INTRAVENOUS at 10:50

## 2019-10-18 RX ADMIN — KETOROLAC TROMETHAMINE 30 MG: 30 INJECTION, SOLUTION INTRAMUSCULAR; INTRAVENOUS at 12:19

## 2019-10-18 RX ADMIN — MIDAZOLAM HYDROCHLORIDE 2 MG: 1 INJECTION, SOLUTION INTRAMUSCULAR; INTRAVENOUS at 10:40

## 2019-10-18 RX ADMIN — ROCURONIUM BROMIDE 50 MG: 10 INJECTION, SOLUTION INTRAVENOUS at 10:51

## 2019-10-18 RX ADMIN — DEXAMETHASONE SODIUM PHOSPHATE 10 MG: 10 INJECTION INTRAMUSCULAR; INTRAVENOUS at 10:51

## 2019-10-18 RX ADMIN — PHENYLEPHRINE HYDROCHLORIDE 100 MCG: 10 INJECTION INTRAVENOUS at 11:07

## 2019-10-18 RX ADMIN — Medication 1.5 MG: at 12:29

## 2019-10-18 RX ADMIN — HYDROMORPHONE HYDROCHLORIDE 0.5 MG: 2 INJECTION, SOLUTION INTRAMUSCULAR; INTRAVENOUS; SUBCUTANEOUS at 11:54

## 2019-10-18 RX ADMIN — SODIUM CHLORIDE: 9 INJECTION, SOLUTION INTRAVENOUS at 10:40

## 2019-10-18 RX ADMIN — Medication 600 MICRO CURIE: at 08:35

## 2019-10-18 RX ADMIN — PHENYLEPHRINE HYDROCHLORIDE 200 MCG: 10 INJECTION INTRAVENOUS at 11:31

## 2019-10-18 RX ADMIN — HYDROMORPHONE HYDROCHLORIDE 0.5 MG: 1 INJECTION, SOLUTION INTRAMUSCULAR; INTRAVENOUS; SUBCUTANEOUS at 13:28

## 2019-10-18 RX ADMIN — HYDROMORPHONE HYDROCHLORIDE 0.5 MG: 2 INJECTION, SOLUTION INTRAMUSCULAR; INTRAVENOUS; SUBCUTANEOUS at 12:35

## 2019-10-18 RX ADMIN — HYDROMORPHONE HYDROCHLORIDE 0.5 MG: 1 INJECTION, SOLUTION INTRAMUSCULAR; INTRAVENOUS; SUBCUTANEOUS at 13:16

## 2019-10-18 RX ADMIN — FENTANYL CITRATE 50 MCG: 50 INJECTION, SOLUTION INTRAMUSCULAR; INTRAVENOUS at 11:42

## 2019-10-18 RX ADMIN — FENTANYL CITRATE 50 MCG: 50 INJECTION, SOLUTION INTRAMUSCULAR; INTRAVENOUS at 11:45

## 2019-10-18 RX ADMIN — LIDOCAINE HYDROCHLORIDE 100 MG: 20 INJECTION, SOLUTION INTRAVENOUS at 10:50

## 2019-10-18 RX ADMIN — HYDROMORPHONE HYDROCHLORIDE 0.5 MG: 2 INJECTION, SOLUTION INTRAMUSCULAR; INTRAVENOUS; SUBCUTANEOUS at 12:33

## 2019-10-18 RX ADMIN — FENTANYL CITRATE 100 MCG: 50 INJECTION, SOLUTION INTRAMUSCULAR; INTRAVENOUS at 11:24

## 2019-10-18 RX ADMIN — ONDANSETRON HYDROCHLORIDE 4 MG: 2 INJECTION, SOLUTION INTRAMUSCULAR; INTRAVENOUS at 12:14

## 2019-10-18 RX ADMIN — PHENYLEPHRINE HYDROCHLORIDE 100 MCG: 10 INJECTION INTRAVENOUS at 11:28

## 2019-10-18 RX ADMIN — PHENYLEPHRINE HYDROCHLORIDE 200 MCG: 10 INJECTION INTRAVENOUS at 11:13

## 2019-10-18 ASSESSMENT — PULMONARY FUNCTION TESTS
PIF_VALUE: 24
PIF_VALUE: 21
PIF_VALUE: 24
PIF_VALUE: 24
PIF_VALUE: 1
PIF_VALUE: 24
PIF_VALUE: 15
PIF_VALUE: 0
PIF_VALUE: 23
PIF_VALUE: 19
PIF_VALUE: 24
PIF_VALUE: 25
PIF_VALUE: 23
PIF_VALUE: 19
PIF_VALUE: 30
PIF_VALUE: 24
PIF_VALUE: 1
PIF_VALUE: 23
PIF_VALUE: 24
PIF_VALUE: 18
PIF_VALUE: 24
PIF_VALUE: 20
PIF_VALUE: 24
PIF_VALUE: 0
PIF_VALUE: 24
PIF_VALUE: 20
PIF_VALUE: 25
PIF_VALUE: 22
PIF_VALUE: 24
PIF_VALUE: 1
PIF_VALUE: 11
PIF_VALUE: 3
PIF_VALUE: 23
PIF_VALUE: 0
PIF_VALUE: 24
PIF_VALUE: 23
PIF_VALUE: 2
PIF_VALUE: 24
PIF_VALUE: 23
PIF_VALUE: 24
PIF_VALUE: 19
PIF_VALUE: 24
PIF_VALUE: 22
PIF_VALUE: 25
PIF_VALUE: 24
PIF_VALUE: 19
PIF_VALUE: 23
PIF_VALUE: 25
PIF_VALUE: 24
PIF_VALUE: 23
PIF_VALUE: 24
PIF_VALUE: 24
PIF_VALUE: 20
PIF_VALUE: 19
PIF_VALUE: 14
PIF_VALUE: 4
PIF_VALUE: 0
PIF_VALUE: 24
PIF_VALUE: 24
PIF_VALUE: 1
PIF_VALUE: 23
PIF_VALUE: 2
PIF_VALUE: 22
PIF_VALUE: 23
PIF_VALUE: 19
PIF_VALUE: 0
PIF_VALUE: 21
PIF_VALUE: 24
PIF_VALUE: 23
PIF_VALUE: 23
PIF_VALUE: 18
PIF_VALUE: 24
PIF_VALUE: 23
PIF_VALUE: 24
PIF_VALUE: 21
PIF_VALUE: 2
PIF_VALUE: 23
PIF_VALUE: 1
PIF_VALUE: 6
PIF_VALUE: 24
PIF_VALUE: 24
PIF_VALUE: 21
PIF_VALUE: 24
PIF_VALUE: 21
PIF_VALUE: 31
PIF_VALUE: 1
PIF_VALUE: 25
PIF_VALUE: 23
PIF_VALUE: 24
PIF_VALUE: 19
PIF_VALUE: 21
PIF_VALUE: 33
PIF_VALUE: 21
PIF_VALUE: 18
PIF_VALUE: 19
PIF_VALUE: 23
PIF_VALUE: 24
PIF_VALUE: 14
PIF_VALUE: 19
PIF_VALUE: 24
PIF_VALUE: 21
PIF_VALUE: 23
PIF_VALUE: 18
PIF_VALUE: 23
PIF_VALUE: 39
PIF_VALUE: 24
PIF_VALUE: 24
PIF_VALUE: 19

## 2019-10-18 ASSESSMENT — PAIN SCALES - GENERAL
PAINLEVEL_OUTOF10: 0
PAINLEVEL_OUTOF10: 9
PAINLEVEL_OUTOF10: 0
PAINLEVEL_OUTOF10: 9
PAINLEVEL_OUTOF10: 4

## 2019-10-18 ASSESSMENT — PAIN DESCRIPTION - FREQUENCY
FREQUENCY: CONTINUOUS
FREQUENCY: CONTINUOUS

## 2019-10-18 ASSESSMENT — LIFESTYLE VARIABLES: SMOKING_STATUS: 1

## 2019-10-18 ASSESSMENT — PAIN DESCRIPTION - ONSET
ONSET: ON-GOING
ONSET: ON-GOING

## 2019-10-18 ASSESSMENT — PAIN DESCRIPTION - ORIENTATION
ORIENTATION: LEFT
ORIENTATION: LEFT

## 2019-10-18 ASSESSMENT — PAIN - FUNCTIONAL ASSESSMENT: PAIN_FUNCTIONAL_ASSESSMENT: 0-10

## 2019-10-18 ASSESSMENT — PAIN DESCRIPTION - DESCRIPTORS
DESCRIPTORS: ACHING;BURNING;CONSTANT
DESCRIPTORS: ACHING;BURNING;CONSTANT

## 2019-10-18 ASSESSMENT — PAIN DESCRIPTION - LOCATION
LOCATION: BREAST
LOCATION: BREAST

## 2019-10-18 ASSESSMENT — PAIN DESCRIPTION - PROGRESSION: CLINICAL_PROGRESSION: GRADUALLY WORSENING

## 2019-10-18 ASSESSMENT — PAIN DESCRIPTION - PAIN TYPE
TYPE: SURGICAL PAIN
TYPE: SURGICAL PAIN

## 2019-10-20 ENCOUNTER — TELEPHONE (OUTPATIENT)
Dept: SURGERY | Age: 59
End: 2019-10-20

## 2019-10-23 ENCOUNTER — TELEPHONE (OUTPATIENT)
Dept: SURGERY | Age: 59
End: 2019-10-23

## 2019-10-23 DIAGNOSIS — C50.912 INVASIVE DUCTAL CARCINOMA OF LEFT BREAST (HCC): Primary | ICD-10-CM

## 2019-10-24 ENCOUNTER — TELEPHONE (OUTPATIENT)
Dept: ONCOLOGY | Age: 59
End: 2019-10-24

## 2019-11-05 ENCOUNTER — OFFICE VISIT (OUTPATIENT)
Dept: BREAST CENTER | Age: 59
End: 2019-11-05
Payer: MEDICARE

## 2019-11-05 VITALS
OXYGEN SATURATION: 98 % | BODY MASS INDEX: 33.03 KG/M2 | TEMPERATURE: 98.7 F | SYSTOLIC BLOOD PRESSURE: 102 MMHG | RESPIRATION RATE: 16 BRPM | HEART RATE: 82 BPM | DIASTOLIC BLOOD PRESSURE: 70 MMHG | WEIGHT: 223 LBS | HEIGHT: 69 IN

## 2019-11-05 DIAGNOSIS — Z12.31 SCREENING MAMMOGRAM, ENCOUNTER FOR: Primary | ICD-10-CM

## 2019-11-05 DIAGNOSIS — Z48.89 ENCOUNTER FOR POSTOPERATIVE CARE: Primary | ICD-10-CM

## 2019-11-05 PROCEDURE — 99024 POSTOP FOLLOW-UP VISIT: CPT | Performed by: SURGERY

## 2019-11-13 ENCOUNTER — HOSPITAL ENCOUNTER (OUTPATIENT)
Dept: RADIATION ONCOLOGY | Age: 59
Discharge: HOME | End: 2019-11-13
Payer: MEDICARE

## 2019-11-13 ENCOUNTER — TELEPHONE (OUTPATIENT)
Dept: RADIATION ONCOLOGY | Age: 59
End: 2019-11-13

## 2019-11-13 VITALS
OXYGEN SATURATION: 95 % | BODY MASS INDEX: 33.71 KG/M2 | TEMPERATURE: 97.8 F | RESPIRATION RATE: 18 BRPM | SYSTOLIC BLOOD PRESSURE: 138 MMHG | HEART RATE: 91 BPM | WEIGHT: 228.25 LBS | DIASTOLIC BLOOD PRESSURE: 78 MMHG

## 2019-11-13 DIAGNOSIS — C50.912 MALIGNANT NEOPLASM OF LEFT FEMALE BREAST, UNSPECIFIED ESTROGEN RECEPTOR STATUS, UNSPECIFIED SITE OF BREAST (HCC): Primary | ICD-10-CM

## 2019-11-13 PROCEDURE — 99205 OFFICE O/P NEW HI 60 MIN: CPT | Performed by: RADIOLOGY

## 2019-11-13 PROCEDURE — 99205 OFFICE O/P NEW HI 60 MIN: CPT

## 2019-11-13 SDOH — ECONOMIC STABILITY: HOUSING INSECURITY: PLEASE ASSESS YOUR PATIENT'S LEVEL OF DISTRESS CONCERNING HOUSING (SCALE FROM 1-10): 6

## 2019-11-13 ASSESSMENT — PAIN DESCRIPTION - DESCRIPTORS: DESCRIPTORS: ACHING;SHARP

## 2019-11-13 ASSESSMENT — PAIN DESCRIPTION - LOCATION: LOCATION: BREAST;HIP

## 2019-11-13 ASSESSMENT — PAIN DESCRIPTION - FREQUENCY: FREQUENCY: CONTINUOUS

## 2019-11-13 ASSESSMENT — PAIN SCALES - GENERAL: PAINLEVEL_OUTOF10: 10

## 2019-11-13 ASSESSMENT — PAIN DESCRIPTION - PAIN TYPE: TYPE: CHRONIC PAIN

## 2019-11-13 ASSESSMENT — PAIN DESCRIPTION - ONSET: ONSET: AWAKENED FROM SLEEP

## 2019-11-14 ENCOUNTER — TELEPHONE (OUTPATIENT)
Dept: RADIATION ONCOLOGY | Age: 59
End: 2019-11-14

## 2019-11-14 ENCOUNTER — OFFICE VISIT (OUTPATIENT)
Dept: ONCOLOGY | Age: 59
End: 2019-11-14
Payer: MEDICARE

## 2019-11-14 ENCOUNTER — HOSPITAL ENCOUNTER (OUTPATIENT)
Dept: INFUSION THERAPY | Age: 59
Discharge: HOME | End: 2019-11-14
Payer: MEDICARE

## 2019-11-14 VITALS
TEMPERATURE: 97.6 F | HEIGHT: 69 IN | BODY MASS INDEX: 33.58 KG/M2 | HEART RATE: 79 BPM | SYSTOLIC BLOOD PRESSURE: 124 MMHG | DIASTOLIC BLOOD PRESSURE: 75 MMHG | WEIGHT: 226.7 LBS

## 2019-11-14 DIAGNOSIS — C50.912 INVASIVE DUCTAL CARCINOMA OF LEFT BREAST (HCC): Primary | ICD-10-CM

## 2019-11-14 PROCEDURE — G8484 FLU IMMUNIZE NO ADMIN: HCPCS | Performed by: INTERNAL MEDICINE

## 2019-11-14 PROCEDURE — 99205 OFFICE O/P NEW HI 60 MIN: CPT | Performed by: INTERNAL MEDICINE

## 2019-11-14 PROCEDURE — G8417 CALC BMI ABV UP PARAM F/U: HCPCS | Performed by: INTERNAL MEDICINE

## 2019-11-14 PROCEDURE — 99214 OFFICE O/P EST MOD 30 MIN: CPT | Performed by: INTERNAL MEDICINE

## 2019-11-14 PROCEDURE — G8427 DOCREV CUR MEDS BY ELIG CLIN: HCPCS | Performed by: INTERNAL MEDICINE

## 2019-11-20 ENCOUNTER — HOSPITAL ENCOUNTER (OUTPATIENT)
Dept: RADIATION ONCOLOGY | Age: 59
Discharge: HOME | End: 2019-11-20
Attending: RADIOLOGY
Payer: MEDICARE

## 2019-11-20 PROCEDURE — 77290 THER RAD SIMULAJ FIELD CPLX: CPT | Performed by: RADIOLOGY

## 2019-11-20 PROCEDURE — 77334 RADIATION TREATMENT AID(S): CPT | Performed by: RADIOLOGY

## 2019-11-22 ENCOUNTER — HOSPITAL ENCOUNTER (OUTPATIENT)
Dept: MAMMOGRAPHY | Age: 59
Discharge: HOME | End: 2019-11-24
Payer: MEDICARE

## 2019-11-22 DIAGNOSIS — C50.912 INVASIVE DUCTAL CARCINOMA OF LEFT BREAST (HCC): ICD-10-CM

## 2019-11-22 PROCEDURE — 77080 DXA BONE DENSITY AXIAL: CPT

## 2019-11-27 ENCOUNTER — HOSPITAL ENCOUNTER (OUTPATIENT)
Dept: RADIATION ONCOLOGY | Age: 59
Discharge: HOME | End: 2019-11-27
Attending: RADIOLOGY
Payer: MEDICARE

## 2019-11-27 PROCEDURE — 77300 RADIATION THERAPY DOSE PLAN: CPT | Performed by: RADIOLOGY

## 2019-11-27 PROCEDURE — 77334 RADIATION TREATMENT AID(S): CPT | Performed by: RADIOLOGY

## 2019-11-27 PROCEDURE — 77293 RESPIRATOR MOTION MGMT SIMUL: CPT | Performed by: RADIOLOGY

## 2019-11-27 PROCEDURE — 77295 3-D RADIOTHERAPY PLAN: CPT | Performed by: RADIOLOGY

## 2019-12-04 ENCOUNTER — HOSPITAL ENCOUNTER (OUTPATIENT)
Dept: RADIATION ONCOLOGY | Age: 59
End: 2019-12-04
Attending: RADIOLOGY
Payer: MEDICARE

## 2019-12-05 ENCOUNTER — APPOINTMENT (OUTPATIENT)
Dept: RADIATION ONCOLOGY | Age: 59
End: 2019-12-05
Attending: RADIOLOGY
Payer: MEDICARE

## 2019-12-06 ENCOUNTER — APPOINTMENT (OUTPATIENT)
Dept: RADIATION ONCOLOGY | Age: 59
End: 2019-12-06
Attending: RADIOLOGY
Payer: MEDICARE

## 2019-12-09 ENCOUNTER — HOSPITAL ENCOUNTER (OUTPATIENT)
Dept: RADIATION ONCOLOGY | Age: 59
Discharge: HOME | End: 2019-12-09
Attending: RADIOLOGY
Payer: MEDICARE

## 2019-12-09 PROCEDURE — 77412 RADIATION TX DELIVERY LVL 3: CPT | Performed by: RADIOLOGY

## 2019-12-09 PROCEDURE — 77417 THER RADIOLOGY PORT IMAGE(S): CPT | Performed by: RADIOLOGY

## 2019-12-10 ENCOUNTER — HOSPITAL ENCOUNTER (OUTPATIENT)
Dept: RADIATION ONCOLOGY | Age: 59
Discharge: HOME | End: 2019-12-10
Attending: RADIOLOGY
Payer: MEDICARE

## 2019-12-10 PROCEDURE — 77412 RADIATION TX DELIVERY LVL 3: CPT | Performed by: RADIOLOGY

## 2019-12-11 ENCOUNTER — TELEPHONE (OUTPATIENT)
Dept: INFUSION THERAPY | Age: 59
End: 2019-12-11

## 2019-12-11 ENCOUNTER — HOSPITAL ENCOUNTER (OUTPATIENT)
Dept: RADIATION ONCOLOGY | Age: 59
Discharge: HOME | End: 2019-12-11
Attending: RADIOLOGY
Payer: MEDICARE

## 2019-12-11 PROCEDURE — 77412 RADIATION TX DELIVERY LVL 3: CPT | Performed by: RADIOLOGY

## 2019-12-12 ENCOUNTER — HOSPITAL ENCOUNTER (OUTPATIENT)
Dept: RADIATION ONCOLOGY | Age: 59
Discharge: HOME | End: 2019-12-12
Attending: RADIOLOGY
Payer: MEDICARE

## 2019-12-12 ENCOUNTER — TELEPHONE (OUTPATIENT)
Dept: RADIATION ONCOLOGY | Age: 59
End: 2019-12-12

## 2019-12-12 ENCOUNTER — HOSPITAL ENCOUNTER (OUTPATIENT)
Dept: RADIATION ONCOLOGY | Age: 59
Discharge: HOME | End: 2019-12-12
Payer: MEDICARE

## 2019-12-12 VITALS
DIASTOLIC BLOOD PRESSURE: 78 MMHG | SYSTOLIC BLOOD PRESSURE: 122 MMHG | HEART RATE: 82 BPM | WEIGHT: 218.25 LBS | RESPIRATION RATE: 20 BRPM | OXYGEN SATURATION: 95 % | TEMPERATURE: 98.1 F | BODY MASS INDEX: 32.23 KG/M2

## 2019-12-12 PROCEDURE — 77412 RADIATION TX DELIVERY LVL 3: CPT | Performed by: RADIOLOGY

## 2019-12-13 ENCOUNTER — HOSPITAL ENCOUNTER (OUTPATIENT)
Dept: RADIATION ONCOLOGY | Age: 59
Discharge: HOME | End: 2019-12-13
Attending: RADIOLOGY
Payer: MEDICARE

## 2019-12-13 PROCEDURE — 77336 RADIATION PHYSICS CONSULT: CPT | Performed by: RADIOLOGY

## 2019-12-13 PROCEDURE — 77412 RADIATION TX DELIVERY LVL 3: CPT | Performed by: RADIOLOGY

## 2019-12-16 ENCOUNTER — HOSPITAL ENCOUNTER (OUTPATIENT)
Dept: RADIATION ONCOLOGY | Age: 59
Discharge: HOME | End: 2019-12-16
Attending: RADIOLOGY
Payer: MEDICARE

## 2019-12-16 PROCEDURE — 77412 RADIATION TX DELIVERY LVL 3: CPT | Performed by: RADIOLOGY

## 2019-12-16 PROCEDURE — 77417 THER RADIOLOGY PORT IMAGE(S): CPT | Performed by: RADIOLOGY

## 2019-12-17 ENCOUNTER — APPOINTMENT (OUTPATIENT)
Dept: RADIATION ONCOLOGY | Age: 59
End: 2019-12-17
Attending: RADIOLOGY
Payer: MEDICARE

## 2019-12-17 PROCEDURE — 77412 RADIATION TX DELIVERY LVL 3: CPT | Performed by: RADIOLOGY

## 2019-12-18 ENCOUNTER — HOSPITAL ENCOUNTER (OUTPATIENT)
Dept: RADIATION ONCOLOGY | Age: 59
Discharge: HOME | End: 2019-12-18
Attending: RADIOLOGY
Payer: MEDICARE

## 2019-12-18 ENCOUNTER — OFFICE VISIT (OUTPATIENT)
Dept: CARDIOLOGY CLINIC | Age: 59
End: 2019-12-18
Payer: MEDICARE

## 2019-12-18 VITALS
BODY MASS INDEX: 33.47 KG/M2 | HEART RATE: 76 BPM | HEIGHT: 69 IN | SYSTOLIC BLOOD PRESSURE: 90 MMHG | WEIGHT: 226 LBS | DIASTOLIC BLOOD PRESSURE: 60 MMHG

## 2019-12-18 DIAGNOSIS — E66.8 MODERATE OBESITY: ICD-10-CM

## 2019-12-18 DIAGNOSIS — J44.9 CHRONIC OBSTRUCTIVE PULMONARY DISEASE, UNSPECIFIED COPD TYPE (HCC): ICD-10-CM

## 2019-12-18 DIAGNOSIS — R06.09 EXERTIONAL DYSPNEA: Primary | ICD-10-CM

## 2019-12-18 DIAGNOSIS — I10 ESSENTIAL HYPERTENSION: ICD-10-CM

## 2019-12-18 DIAGNOSIS — C50.912 INVASIVE DUCTAL CARCINOMA OF LEFT BREAST (HCC): ICD-10-CM

## 2019-12-18 PROCEDURE — 3017F COLORECTAL CA SCREEN DOC REV: CPT | Performed by: INTERNAL MEDICINE

## 2019-12-18 PROCEDURE — 99214 OFFICE O/P EST MOD 30 MIN: CPT | Performed by: INTERNAL MEDICINE

## 2019-12-18 PROCEDURE — 77412 RADIATION TX DELIVERY LVL 3: CPT | Performed by: RADIOLOGY

## 2019-12-18 PROCEDURE — 93000 ELECTROCARDIOGRAM COMPLETE: CPT | Performed by: INTERNAL MEDICINE

## 2019-12-18 PROCEDURE — 3023F SPIROM DOC REV: CPT | Performed by: INTERNAL MEDICINE

## 2019-12-18 PROCEDURE — G8417 CALC BMI ABV UP PARAM F/U: HCPCS | Performed by: INTERNAL MEDICINE

## 2019-12-18 PROCEDURE — G8484 FLU IMMUNIZE NO ADMIN: HCPCS | Performed by: INTERNAL MEDICINE

## 2019-12-18 PROCEDURE — G8427 DOCREV CUR MEDS BY ELIG CLIN: HCPCS | Performed by: INTERNAL MEDICINE

## 2019-12-18 PROCEDURE — 4004F PT TOBACCO SCREEN RCVD TLK: CPT | Performed by: INTERNAL MEDICINE

## 2019-12-18 PROCEDURE — G8926 SPIRO NO PERF OR DOC: HCPCS | Performed by: INTERNAL MEDICINE

## 2019-12-18 RX ORDER — ACETAMINOPHEN 500 MG
500 TABLET ORAL EVERY 6 HOURS PRN
COMMUNITY

## 2019-12-19 ENCOUNTER — HOSPITAL ENCOUNTER (OUTPATIENT)
Dept: RADIATION ONCOLOGY | Age: 59
Discharge: HOME | End: 2019-12-19
Attending: RADIOLOGY
Payer: MEDICARE

## 2019-12-19 VITALS
DIASTOLIC BLOOD PRESSURE: 76 MMHG | RESPIRATION RATE: 20 BRPM | HEART RATE: 83 BPM | OXYGEN SATURATION: 95 % | BODY MASS INDEX: 33.41 KG/M2 | WEIGHT: 226.25 LBS | SYSTOLIC BLOOD PRESSURE: 120 MMHG

## 2019-12-19 PROCEDURE — 77412 RADIATION TX DELIVERY LVL 3: CPT | Performed by: RADIOLOGY

## 2019-12-20 ENCOUNTER — HOSPITAL ENCOUNTER (OUTPATIENT)
Dept: RADIATION ONCOLOGY | Age: 59
Discharge: HOME | End: 2019-12-20
Attending: RADIOLOGY
Payer: MEDICARE

## 2019-12-20 PROCEDURE — 77336 RADIATION PHYSICS CONSULT: CPT | Performed by: RADIOLOGY

## 2019-12-20 PROCEDURE — 77412 RADIATION TX DELIVERY LVL 3: CPT | Performed by: RADIOLOGY

## 2019-12-23 ENCOUNTER — HOSPITAL ENCOUNTER (OUTPATIENT)
Dept: RADIATION ONCOLOGY | Age: 59
Discharge: HOME | End: 2019-12-23
Attending: RADIOLOGY
Payer: MEDICARE

## 2019-12-23 PROCEDURE — 77417 THER RADIOLOGY PORT IMAGE(S): CPT | Performed by: RADIOLOGY

## 2019-12-23 PROCEDURE — 77412 RADIATION TX DELIVERY LVL 3: CPT | Performed by: RADIOLOGY

## 2019-12-24 ENCOUNTER — HOSPITAL ENCOUNTER (OUTPATIENT)
Dept: RADIATION ONCOLOGY | Age: 59
Discharge: HOME | End: 2019-12-24
Attending: RADIOLOGY
Payer: MEDICARE

## 2019-12-24 PROCEDURE — 77412 RADIATION TX DELIVERY LVL 3: CPT | Performed by: RADIOLOGY

## 2019-12-26 ENCOUNTER — HOSPITAL ENCOUNTER (OUTPATIENT)
Dept: RADIATION ONCOLOGY | Age: 59
Discharge: HOME | End: 2019-12-26
Attending: RADIOLOGY
Payer: MEDICARE

## 2019-12-26 PROCEDURE — 77412 RADIATION TX DELIVERY LVL 3: CPT | Performed by: RADIOLOGY

## 2019-12-27 ENCOUNTER — HOSPITAL ENCOUNTER (OUTPATIENT)
Dept: RADIATION ONCOLOGY | Age: 59
Discharge: HOME | End: 2019-12-27
Attending: RADIOLOGY
Payer: MEDICARE

## 2019-12-27 VITALS
RESPIRATION RATE: 18 BRPM | BODY MASS INDEX: 33.97 KG/M2 | HEART RATE: 71 BPM | OXYGEN SATURATION: 96 % | WEIGHT: 230 LBS | SYSTOLIC BLOOD PRESSURE: 118 MMHG | DIASTOLIC BLOOD PRESSURE: 76 MMHG

## 2019-12-27 PROCEDURE — 77412 RADIATION TX DELIVERY LVL 3: CPT | Performed by: RADIOLOGY

## 2019-12-27 PROCEDURE — 99999 PR OFFICE/OUTPT VISIT,PROCEDURE ONLY: CPT | Performed by: RADIOLOGY

## 2019-12-30 ENCOUNTER — HOSPITAL ENCOUNTER (OUTPATIENT)
Dept: RADIATION ONCOLOGY | Age: 59
Discharge: HOME | End: 2019-12-30
Attending: RADIOLOGY
Payer: MEDICARE

## 2019-12-30 PROCEDURE — 77336 RADIATION PHYSICS CONSULT: CPT | Performed by: RADIOLOGY

## 2019-12-30 PROCEDURE — 77412 RADIATION TX DELIVERY LVL 3: CPT | Performed by: RADIOLOGY

## 2019-12-31 ENCOUNTER — HOSPITAL ENCOUNTER (OUTPATIENT)
Dept: RADIATION ONCOLOGY | Age: 59
Discharge: HOME | End: 2019-12-31
Attending: RADIOLOGY
Payer: MEDICARE

## 2019-12-31 PROCEDURE — 77412 RADIATION TX DELIVERY LVL 3: CPT | Performed by: RADIOLOGY

## 2019-12-31 NOTE — PROGRESS NOTES
Tom Beauchamp  12/31/2019  7:37 AM          Current Outpatient Medications   Medication Sig Dispense Refill    acetaminophen (TYLENOL) 500 MG tablet Take 500 mg by mouth every 6 hours as needed for Pain      Glucos-Chondroit-Hyaluron-MSM (GLUCOSAMINE CHONDROITIN JOINT PO) Take by mouth      HYDROXYZINE PAMOATE PO Take by mouth 2 times daily as needed       omeprazole (PRILOSEC) 20 MG delayed release capsule Take 1 capsule by mouth 2 times daily 30 capsule 3    amLODIPine-benazepril (LOTREL) 5-40 MG per capsule TK 1 C PO QD  0    BREO ELLIPTA 100-25 MCG/INH AEPB inhaler Inhale 2 puffs into the lungs daily       VENTOLIN  (90 Base) MCG/ACT inhaler Inhale 1 puff into the lungs every 4 hours as needed       buPROPion (WELLBUTRIN XL) 150 MG extended release tablet TK 1 T PO  QAM  3    citalopram (CELEXA) 40 MG tablet TK 1 T PO QD (as needed)  3     No current facility-administered medications for this encounter. This is an up-to-date medication list.    Please take this list to your next care provider, and discard any previous medication lists.

## 2020-01-08 ENCOUNTER — HOSPITAL ENCOUNTER (OUTPATIENT)
Dept: INFUSION THERAPY | Age: 60
End: 2020-01-08
Payer: MEDICARE

## 2020-01-14 ENCOUNTER — HOSPITAL ENCOUNTER (OUTPATIENT)
Dept: INFUSION THERAPY | Age: 60
Discharge: HOME | End: 2020-01-14
Payer: MEDICARE

## 2020-01-14 ENCOUNTER — OFFICE VISIT (OUTPATIENT)
Dept: ONCOLOGY | Age: 60
End: 2020-01-14
Payer: MEDICARE

## 2020-01-14 VITALS
BODY MASS INDEX: 33.67 KG/M2 | OXYGEN SATURATION: 97 % | HEIGHT: 69 IN | DIASTOLIC BLOOD PRESSURE: 72 MMHG | HEART RATE: 75 BPM | TEMPERATURE: 97.1 F | WEIGHT: 227.3 LBS | SYSTOLIC BLOOD PRESSURE: 131 MMHG

## 2020-01-14 DIAGNOSIS — C50.912 INVASIVE DUCTAL CARCINOMA OF LEFT BREAST (HCC): ICD-10-CM

## 2020-01-14 LAB
ALBUMIN SERPL-MCNC: 3.9 G/DL (ref 3.5–5.2)
ALP BLD-CCNC: 142 U/L (ref 35–104)
ALT SERPL-CCNC: 14 U/L (ref 0–32)
ANION GAP SERPL CALCULATED.3IONS-SCNC: 12 MMOL/L (ref 7–16)
AST SERPL-CCNC: 16 U/L (ref 0–31)
BASOPHILS ABSOLUTE: 0.07 E9/L (ref 0–0.2)
BASOPHILS RELATIVE PERCENT: 1.1 % (ref 0–2)
BILIRUB SERPL-MCNC: 0.2 MG/DL (ref 0–1.2)
BUN BLDV-MCNC: 15 MG/DL (ref 6–20)
CALCIUM SERPL-MCNC: 9.1 MG/DL (ref 8.6–10.2)
CHLORIDE BLD-SCNC: 103 MMOL/L (ref 98–107)
CO2: 23 MMOL/L (ref 22–29)
CREAT SERPL-MCNC: 0.7 MG/DL (ref 0.5–1)
EOSINOPHILS ABSOLUTE: 0.15 E9/L (ref 0.05–0.5)
EOSINOPHILS RELATIVE PERCENT: 2.3 % (ref 0–6)
GFR AFRICAN AMERICAN: >60
GFR NON-AFRICAN AMERICAN: >60 ML/MIN/1.73
GLUCOSE BLD-MCNC: 84 MG/DL (ref 74–99)
HCT VFR BLD CALC: 43.3 % (ref 34–48)
HEMOGLOBIN: 14.2 G/DL (ref 11.5–15.5)
IMMATURE GRANULOCYTES #: 0.03 E9/L
IMMATURE GRANULOCYTES %: 0.5 % (ref 0–5)
LYMPHOCYTES ABSOLUTE: 1.29 E9/L (ref 1.5–4)
LYMPHOCYTES RELATIVE PERCENT: 19.9 % (ref 20–42)
MCH RBC QN AUTO: 30.7 PG (ref 26–35)
MCHC RBC AUTO-ENTMCNC: 32.8 % (ref 32–34.5)
MCV RBC AUTO: 93.7 FL (ref 80–99.9)
MONOCYTES ABSOLUTE: 0.6 E9/L (ref 0.1–0.95)
MONOCYTES RELATIVE PERCENT: 9.2 % (ref 2–12)
NEUTROPHILS ABSOLUTE: 4.35 E9/L (ref 1.8–7.3)
NEUTROPHILS RELATIVE PERCENT: 67 % (ref 43–80)
PDW BLD-RTO: 12.8 FL (ref 11.5–15)
PLATELET # BLD: 293 E9/L (ref 130–450)
PMV BLD AUTO: 9.1 FL (ref 7–12)
POTASSIUM SERPL-SCNC: 4.5 MMOL/L (ref 3.5–5)
RBC # BLD: 4.62 E12/L (ref 3.5–5.5)
SODIUM BLD-SCNC: 138 MMOL/L (ref 132–146)
TOTAL PROTEIN: 6.9 G/DL (ref 6.4–8.3)
WBC # BLD: 6.5 E9/L (ref 4.5–11.5)

## 2020-01-14 PROCEDURE — 99214 OFFICE O/P EST MOD 30 MIN: CPT | Performed by: INTERNAL MEDICINE

## 2020-01-14 PROCEDURE — G8417 CALC BMI ABV UP PARAM F/U: HCPCS | Performed by: INTERNAL MEDICINE

## 2020-01-14 PROCEDURE — 4004F PT TOBACCO SCREEN RCVD TLK: CPT | Performed by: INTERNAL MEDICINE

## 2020-01-14 PROCEDURE — 80053 COMPREHEN METABOLIC PANEL: CPT

## 2020-01-14 PROCEDURE — G8427 DOCREV CUR MEDS BY ELIG CLIN: HCPCS | Performed by: INTERNAL MEDICINE

## 2020-01-14 PROCEDURE — 85025 COMPLETE CBC W/AUTO DIFF WBC: CPT

## 2020-01-14 PROCEDURE — 36415 COLL VENOUS BLD VENIPUNCTURE: CPT

## 2020-01-14 PROCEDURE — G8484 FLU IMMUNIZE NO ADMIN: HCPCS | Performed by: INTERNAL MEDICINE

## 2020-01-14 PROCEDURE — 99212 OFFICE O/P EST SF 10 MIN: CPT

## 2020-01-14 PROCEDURE — 3017F COLORECTAL CA SCREEN DOC REV: CPT | Performed by: INTERNAL MEDICINE

## 2020-01-14 RX ORDER — ANASTROZOLE 1 MG/1
1 TABLET ORAL DAILY
Qty: 30 TABLET | Refills: 0 | Status: SHIPPED
Start: 2020-01-14 | End: 2020-02-11 | Stop reason: SDUPTHER

## 2020-01-14 NOTE — PROGRESS NOTES
[facility] [address]    Attending Clinic Note    Reason for Visit: Follow-up on a patient with Left Breast Cancer     PCP:  Hawa Covarrubias MD    History of Present Illness:  61 y.o. female with a past medical hx of COPD, obesity, and osteoarthritis. The mass was located in the 2 o'clock position of left breast.  Breast cancer risk factors include age, gender, menopause after 48, family history of breast and pancreatic cancer. Ashkenazi Adventist Ancestry: No     Bilateral Screening Mammogram 05/21/2019: There is an irregular mass measuring 7 mm seen in the posterior upper outer quadrant of the left breast.   Left Breast US 05/31/2019: there is an irregular solid mass with angular margins measuring 8 x 6 x 7 mm in the left breast at 2 o'clock located 8 centimeters from the nipple. Internal echogenicity is hypoechoic. There is no axillary lymphadenopathy. Left Breast Mass Biopsy 05/31/2019:   Breast, left mass at 2:00, biopsy:  Invasive ductal carcinoma admixed with ductal carcinoma in situ (DCIS)  with microcalcifications, see comment. Comment: The invasive ductal carcinoma is Kellogg grade 1 (score of 4): glandular differentiation score 1, nuclear pleomorphism score 2, mitotic activity score 1. The DCIS is cribriform type with microcalcifications, lacking necrosis. Immunostain for p63 shows negative staining in the invasive carcinoma. Breast Cancer Marker Studies:  ER:Positive:100%  [place]:Positive:70%  Her-2/leslie (c-erb B-2) protein expression:  Negative (Score 0)     Needle localized left lumpectomy with sentinel lymph node excision on 10/18/2019. A. Left breast, needle localization excision/lumpectomy: Invasive ductal carcinoma, nuclear grade 2, see cancer case summary. Ductal carcinoma in situ, low grade, cribriform and solid types. Margins of excision are negative for involvement by invasive or in situ carcinoma.   Microcalcifications are present in association with invasive carcinoma, ductal carcinoma in situ and benign breast ducts. Changes compatible with prior biopsy site. B. [place] lymph node #1, biopsy: 8 of 8 lymph nodes negative for involvement by carcinoma, sinus histiocytosis is present. CANCER CASE SUMMARY:  Procedure: Excision with needle localization  Specimen laterality: Left  Tumor site: 2:00 position (per history and physical examination per  patient's electronic medical record)  Tumor size: 7 mm in greatest dimension       Additional dimensions: 7 x 5 mm  Histologic type:  Invasive carcinoma of no special type (invasive ductal  carcinoma, not otherwise specified)  Histologic grade (Sam histologic score):   Glandular/tubular differentiation: Score 2   Nuclear pleomorphism: Score 2   Mitotic rate: Score 1   Overall grade: Grade 1 (score of 5)  Tumor focality: Single focus of invasive carcinoma  Ductal carcinoma in situ: Present, negative for extensive intraductal  component       Size/extent of DCIS: Less than 1 mm in any given tissue section,       DCIS is focally present in 6 of 10 tissue blocks       Architectural patterns: Solid and cribriform       Nuclear grade: Grade 1       Necrosis: Not identified  Lobular carcinoma in situ: Not identified  Invasive carcinoma margins: Uninvolved by invasive carcinoma   Distance from closest margin: 5 mm from closest superior margin  DCIS margins: Uninvolved by DCIS   Distance from closest margin: 2 mm from the closest medial margin  Regional lymph nodes: Uninvolved by tumor cells   Total number of lymph nodes examined: 8       Number of sentinel lymph nodes examined: 8  Treatment effect: No known presurgical therapy  Lymphovascular invasion: Not identified  Pathologic stage classification (pTNM, AJCC 8th Edition):   pT1b, pN0    Oncotype DX Breast Cancer Report-Node Negative  Recurrence Score Result-18  Distant recurrence risk at 9 years- 5%  Absolute chemotherapy benefit- <1%    NCCN guidelines reviewed with patient. No indication for adjuvant chemotherapy; Recommended adjuvant RT followed by hormonal therapy (Arimidex 1 mg p.o. daily for 5 years). RT was started on 12/09/2019 and completed on 12/31/2019  DEXA scan 11/22/2019 normal in LS and hips. Review of Systems;  CONSTITUTIONAL: No fever, chills. Good appetite and energy level. ENMT: Eyes: No diplopia; Nose: No epistaxis. Mouth: No sore throat. RESPIRATORY: No hemoptysis, shortness of breath, cough. CARDIOVASCULAR: No chest pain, palpitations. GASTROINTESTINAL: No nausea/vomiting, abdominal pain, diarrhea/constipation. GENITOURINARY: No dysuria, urinary frequency, hematuria. NEURO: No syncope, presyncope, headache. Remainder:  ROS NEGATIVE    Past Medical History:      Diagnosis Date    Anxiety     Arthritis     Back pain     Cancer ([facility] [address] 2019    breast    Chronic obstructive pulmonary disease (COPD) ([facility] [address] 8/18/2017    Depression     Diverticulosis     Hip pain, bilateral     Right worse than left    Hypertension     Moderate obesity 8/17/2017    Osteoarthritis of right hip 7/31/2012    Tobacco abuse      Medications:  Reviewed and reconciled. Allergies: Allergies   Allergen Reactions    Latex Rash    Bacitracin     Cephalosporins Hives    Other      Black sutures  hand swelled up    Penicillins      Unknown       Physical Exam:  /72 (Site: Right Upper Arm, Position: Sitting, Cuff Size: Large Adult)   Pulse 75   Temp 97.1 °F (36.2 °C) (Temporal)   Ht 5' 9\" (1.753 m)   Wt 227 lb 4.8 oz (103.1 kg)   LMP 01/20/2014   SpO2 97%   BMI 33.57 kg/m²   GENERAL: Alert, oriented x 3, not in acute distress. HEENT: PERRLA; EOMI. Oropharynx clear. NECK: Supple. Without lymphadenopathy. LUNGS: Good air entry bilaterally. No wheezing, crackles or ronchi. CARDIOVASCULAR: Regular rate. No murmurs, rubs or gallops. ABDOMEN: Soft. Non-tender, non-distended. Positive bowel sounds.   EXTREMITIES: Without clubbing, cyanosis, or edema.   NEUROLOGIC: No focal deficits. ECOG 1    Impression/Plan:  61 y.o. female who underwent Needle localized left lumpectomy with sentinel lymph node excision on 10/18/2019. A. Left breast, needle localization excision/lumpectomy: Invasive ductal carcinoma, nuclear grade 2, see cancer case summary. Ductal carcinoma in situ, low grade, cribriform and solid types. Margins of excision are negative for involvement by invasive or in situ carcinoma. Microcalcifications are present in association with invasive carcinoma, ductal carcinoma in situ and benign breast ducts. Changes compatible with prior biopsy site. B. [place] lymph node #1, biopsy: 8 of 8 lymph nodes negative for involvement by carcinoma, sinus histiocytosis is present. CANCER CASE SUMMARY:  Procedure: Excision with needle localization  Specimen laterality: Left  Tumor site: 2:00 position (per history and physical examination per  patient's electronic medical record)  Tumor size: 7 mm in greatest dimension       Additional dimensions: 7 x 5 mm  Histologic type:  Invasive carcinoma of no special type (invasive ductal  carcinoma, not otherwise specified)  Histologic grade (Roosevelt histologic score):   Glandular/tubular differentiation: Score 2   Nuclear pleomorphism: Score 2   Mitotic rate: Score 1   Overall grade: Grade 1 (score of 5)  Tumor focality: Single focus of invasive carcinoma  Ductal carcinoma in situ: Present, negative for extensive intraductal  component       Size/extent of DCIS: Less than 1 mm in any given tissue section,       DCIS is focally present in 6 of 10 tissue blocks       Architectural patterns: Solid and cribriform       Nuclear grade: Grade 1       Necrosis: Not identified  Lobular carcinoma in situ: Not identified  Invasive carcinoma margins: Uninvolved by invasive carcinoma   Distance from closest margin: 5 mm from closest superior margin  DCIS margins: Uninvolved by DCIS   Distance from closest margin: 2 mm from the closest medial margin  Regional lymph nodes: Uninvolved by tumor cells   Total number of lymph nodes examined: 8       Number of sentinel lymph nodes examined: 8  Treatment effect: No known presurgical therapy  Lymphovascular invasion: Not identified  Pathologic stage classification (pTNM, AJCC 8th Edition):   pT1b, pN0    Breast Cancer Marker Studies:  ER:Positive:100%  [place]:Positive:70%  Her-2/leslie (c-erb B-2) protein expression:  Negative (Score 0)     Oncotype DX Recurrence Score Result-18  NCCN guidelines reviewed with patient. No indication for adjuvant chemotherapy; Recommended adjuvant RT followed by hormonal therapy (Arimidex 1 mg p.o. daily for 5 years). RT was started on 12/09/2019 and completed on 12/31/2019  DEXA scan 11/22/2019 normal in LS and hips. Arimidex 1 mg po daily will be started today 01/14/2020. Side effects of Arimidex reviewed with patient. She agreed to proceed. Take Ca/VitD while on Arimidex. RTC 4 weeks for Arimidex toxicity check.      Cong Victor MD    2/69/1367  Board Certified Medical Oncologist

## 2020-02-03 ENCOUNTER — HOSPITAL ENCOUNTER (OUTPATIENT)
Dept: RADIATION ONCOLOGY | Age: 60
Discharge: HOME | End: 2020-02-03
Attending: RADIOLOGY
Payer: MEDICARE

## 2020-02-03 VITALS
OXYGEN SATURATION: 96 % | BODY MASS INDEX: 33.74 KG/M2 | HEART RATE: 76 BPM | WEIGHT: 228.5 LBS | TEMPERATURE: 97.9 F | SYSTOLIC BLOOD PRESSURE: 100 MMHG | DIASTOLIC BLOOD PRESSURE: 66 MMHG | RESPIRATION RATE: 18 BRPM

## 2020-02-03 PROCEDURE — 99999 PR OFFICE/OUTPT VISIT,PROCEDURE ONLY: CPT | Performed by: NURSE PRACTITIONER

## 2020-02-03 RX ORDER — OYSTER SHELL CALCIUM WITH VITAMIN D 500; 200 MG/1; [IU]/1
1 TABLET, FILM COATED ORAL 2 TIMES DAILY
COMMUNITY
End: 2020-02-03

## 2020-02-03 NOTE — ADDENDUM NOTE
Encounter addended by: Giovanni Barnard RN on: 2/3/2020 2:21 PM   Actions taken: Order Reconciliation Section accessed, Home Medications modified

## 2020-02-03 NOTE — PROGRESS NOTES
needed)  3     No current facility-administered medications for this encounter. Physical Examination:   Vitals:    02/03/20 0948   BP: 100/66       Wt Readings from Last 3 Encounters:   01/14/20 227 lb 4.8 oz (103.1 kg)   12/27/19 230 lb (104.3 kg)   12/19/19 226 lb 4 oz (102.6 kg)       Alert and fully ambulatory. Pleasant and conversant. Irradiated skin shows dryness and hyperpigmentation to treatment area. HR reg. Rhythm reg. Lungs CTA. ASSESSMENT/PLAN:      pT1b, pN0 cMo left breast CA (ER+, WV+, Her2-), s/p BCS and adjuvant XRT to the left breast completed 12/31/19 (4256 cGy in 16 fractions). Patient is doing well post-radiation completion. Skin care and sun care reviewed. Encouraged monthly self breast exams. Imaging per med onc/breast surgery. Cont to follow with Dr Josh Lezama for medical oncology. Started on Arimidex and tolerating well. Next appt 2/11/20. Cont to follow with HALINA Nolasco for breast surgery. Next appt 5/27/20. I discussed follow up plans with Brian Fitch. At this time, I will see the patient back in 6 months for a post-radiation completion follow-up visit. Brian Fitch is to follow up with other providers involved in their care as directed (including but not limited to Medical Oncology, Primary Care, Pulmonary, and Surgery). The patient was given our contact number in the event that if at any time they change their mind and would like to return to the clinic to see either myself or one of the Radiation Oncologists, they can simply call us and we would be happy to see them sooner. Thank you for involving us in the management of this extremely pleasant patient. More than 15 min was in direct contact with pt coordinating/giving care. >50% of the visit was spent in counseling the pt on the following: Follow up care    The nurses notes were reviewed and incorporated into this assessment and plan.        Questions answered to apparent satisfaction.       Mark Lin, MSN, RN, APRN-CNP  Nurse Practitioner for Radiation Oncology    [address] [place] [address]) [address] [place] [address] Road: [phone] (EEW: [phone])  [address] Road:  [phone] (PFN:  [phone])  [address] [place] [address]) [address] [place] [address] Road: [phone] (UDN: [phone])

## 2020-02-03 NOTE — PROGRESS NOTES
impaired vision No - 0       5. Age Less than 65 years - 0       6. Medication: diuretics, strong analgesics, hypnotics, sedatives, antihypertensive agents   Yes - 3   7. Falls:  recent history of falls within the last 3 months (not to include slipping or tripping)   No - 0   TOTAL 5    If score of 4 or greater was education given? Yes       TABLE 2   Risk Score Risk Level Plan of Care   0-3 Little or  No Risk 1. Provide assistance as indicated for ambulation activities  2. Reorient confused/cognitively impaired patient  3. Call-light/bell within patient's reach  4. Chair/bed in low position, stretcher/bed with siderails up except when performing patient care activities  5. Educate patient/family/caregiver on falls prevention  6.  Reassess in 12 weeks or with any noted change in patient condition which places them at a risk for a fall   4-6 Moderate Risk 1. Provide assistance as indicated for ambulation activities  2. Reorient confused/cognitively impaired patient  3. Call-light/bell within patient's reach  4. Chair/bed in low position, stretcher/bed with siderails up except when performing patient care activities  5. Educate patient/family/caregiver on falls prevention  6. Falls risk precaution (Yellow sticker Level II) placed on patient chart   7 or   Higher High Risk 1. Place patient in easily observable treatment room  2. Patient attended at all times by family member or staff  3. Provide assistance as indicated for ambulation activities  4. Reorient confused/cognitively impaired patient  5. Call-light/bell within patient's reach  6. Chair/bed in low position, stretcher/bed with siderails up except when performing patient care activities  7. Educate patient/family/caregiver on falls prevention  8.   Falls risk precaution (Yellow sticker Level III) placed on patient chart           MALNUTRITION RISK SCREENING ASSESSMENT    2/3/2020   Patient:  Tobi Husbands  Sex:  female    Instructions:

## 2020-02-11 ENCOUNTER — OFFICE VISIT (OUTPATIENT)
Dept: ONCOLOGY | Age: 60
End: 2020-02-11
Payer: MEDICARE

## 2020-02-11 ENCOUNTER — HOSPITAL ENCOUNTER (OUTPATIENT)
Dept: INFUSION THERAPY | Age: 60
Discharge: HOME | End: 2020-02-11
Payer: MEDICARE

## 2020-02-11 VITALS
BODY MASS INDEX: 33.78 KG/M2 | HEART RATE: 74 BPM | TEMPERATURE: 96.9 F | WEIGHT: 228.1 LBS | SYSTOLIC BLOOD PRESSURE: 120 MMHG | DIASTOLIC BLOOD PRESSURE: 76 MMHG | HEIGHT: 69 IN | OXYGEN SATURATION: 95 %

## 2020-02-11 DIAGNOSIS — C50.912 INVASIVE DUCTAL CARCINOMA OF LEFT BREAST (HCC): ICD-10-CM

## 2020-02-11 LAB
ALBUMIN SERPL-MCNC: 3.9 G/DL (ref 3.5–5.2)
ALP BLD-CCNC: 136 U/L (ref 35–104)
ALT SERPL-CCNC: 19 U/L (ref 0–32)
ANION GAP SERPL CALCULATED.3IONS-SCNC: 12 MMOL/L (ref 7–16)
AST SERPL-CCNC: 19 U/L (ref 0–31)
BASOPHILS ABSOLUTE: 0.04 E9/L (ref 0–0.2)
BASOPHILS RELATIVE PERCENT: 0.7 % (ref 0–2)
BILIRUB SERPL-MCNC: 0.5 MG/DL (ref 0–1.2)
BUN BLDV-MCNC: 19 MG/DL (ref 6–20)
CALCIUM SERPL-MCNC: 10 MG/DL (ref 8.6–10.2)
CHLORIDE BLD-SCNC: 102 MMOL/L (ref 98–107)
CO2: 27 MMOL/L (ref 22–29)
CREAT SERPL-MCNC: 0.8 MG/DL (ref 0.5–1)
EOSINOPHILS ABSOLUTE: 0.12 E9/L (ref 0.05–0.5)
EOSINOPHILS RELATIVE PERCENT: 2 % (ref 0–6)
GFR AFRICAN AMERICAN: >60
GFR NON-AFRICAN AMERICAN: >60 ML/MIN/1.73
GLUCOSE BLD-MCNC: 93 MG/DL (ref 74–99)
HCT VFR BLD CALC: 44 % (ref 34–48)
HEMOGLOBIN: 14.6 G/DL (ref 11.5–15.5)
IMMATURE GRANULOCYTES #: 0.02 E9/L
IMMATURE GRANULOCYTES %: 0.3 % (ref 0–5)
LYMPHOCYTES ABSOLUTE: 1.81 E9/L (ref 1.5–4)
LYMPHOCYTES RELATIVE PERCENT: 30.6 % (ref 20–42)
MCH RBC QN AUTO: 30.6 PG (ref 26–35)
MCHC RBC AUTO-ENTMCNC: 33.2 % (ref 32–34.5)
MCV RBC AUTO: 92.2 FL (ref 80–99.9)
MONOCYTES ABSOLUTE: 0.72 E9/L (ref 0.1–0.95)
MONOCYTES RELATIVE PERCENT: 12.2 % (ref 2–12)
NEUTROPHILS ABSOLUTE: 3.2 E9/L (ref 1.8–7.3)
NEUTROPHILS RELATIVE PERCENT: 54.2 % (ref 43–80)
PDW BLD-RTO: 12.3 FL (ref 11.5–15)
PLATELET # BLD: 304 E9/L (ref 130–450)
PMV BLD AUTO: 9.1 FL (ref 7–12)
POTASSIUM SERPL-SCNC: 4.5 MMOL/L (ref 3.5–5)
RBC # BLD: 4.77 E12/L (ref 3.5–5.5)
SODIUM BLD-SCNC: 141 MMOL/L (ref 132–146)
TOTAL PROTEIN: 7.2 G/DL (ref 6.4–8.3)
WBC # BLD: 5.9 E9/L (ref 4.5–11.5)

## 2020-02-11 PROCEDURE — 99212 OFFICE O/P EST SF 10 MIN: CPT

## 2020-02-11 PROCEDURE — 80053 COMPREHEN METABOLIC PANEL: CPT

## 2020-02-11 PROCEDURE — 36415 COLL VENOUS BLD VENIPUNCTURE: CPT

## 2020-02-11 PROCEDURE — 3017F COLORECTAL CA SCREEN DOC REV: CPT | Performed by: INTERNAL MEDICINE

## 2020-02-11 PROCEDURE — 99214 OFFICE O/P EST MOD 30 MIN: CPT | Performed by: INTERNAL MEDICINE

## 2020-02-11 PROCEDURE — G8484 FLU IMMUNIZE NO ADMIN: HCPCS | Performed by: INTERNAL MEDICINE

## 2020-02-11 PROCEDURE — G8417 CALC BMI ABV UP PARAM F/U: HCPCS | Performed by: INTERNAL MEDICINE

## 2020-02-11 PROCEDURE — 4004F PT TOBACCO SCREEN RCVD TLK: CPT | Performed by: INTERNAL MEDICINE

## 2020-02-11 PROCEDURE — 85025 COMPLETE CBC W/AUTO DIFF WBC: CPT

## 2020-02-11 PROCEDURE — G8427 DOCREV CUR MEDS BY ELIG CLIN: HCPCS | Performed by: INTERNAL MEDICINE

## 2020-02-11 RX ORDER — BREXPIPRAZOLE 0.5 MG/1
TABLET ORAL
COMMUNITY
Start: 2020-01-31 | End: 2021-07-13 | Stop reason: ALTCHOICE

## 2020-02-11 RX ORDER — ANASTROZOLE 1 MG/1
1 TABLET ORAL DAILY
Qty: 90 TABLET | Refills: 1 | Status: SHIPPED
Start: 2020-02-11 | End: 2020-06-26 | Stop reason: SDUPTHER

## 2020-02-11 RX ORDER — AMLODIPINE BESYLATE 5 MG/1
10 TABLET ORAL
COMMUNITY
Start: 2020-02-03 | End: 2021-12-13 | Stop reason: DRUGHIGH

## 2020-02-11 NOTE — PROGRESS NOTES
patient. No indication for adjuvant chemotherapy; Recommended adjuvant RT followed by hormonal therapy (Arimidex 1 mg p.o. daily for 5 years). RT was started on 12/09/2019 and completed on 12/31/2019  DEXA scan 11/22/2019 normal in LS and hips. Arimidex 1 mg po daily was started on 01/14/2020 with fair tolerance so far. Mild hot flashes not affecting quality of life. Review of Systems;  CONSTITUTIONAL: No fever. Mild hot flashes not affecting quality of life. Good appetite and energy level. ENMT: Eyes: No diplopia; Nose: No epistaxis. Mouth: No sore throat. RESPIRATORY: No hemoptysis, shortness of breath, cough. CARDIOVASCULAR: No chest pain, palpitations. GASTROINTESTINAL: No nausea/vomiting, abdominal pain, diarrhea/constipation. GENITOURINARY: No dysuria, urinary frequency, hematuria. NEURO: No syncope, presyncope, headache. Remainder:  ROS NEGATIVE    Past Medical History:      Diagnosis Date    Anxiety     Arthritis     Back pain     Cancer ([facility] [address] 2019    breast    Chronic obstructive pulmonary disease (COPD) ([facility] [address] 8/18/2017    Depression     Diverticulosis     Hip pain, bilateral     Right worse than left    Hypertension     Moderate obesity 8/17/2017    Osteoarthritis of right hip 7/31/2012    Tobacco abuse      Medications:  Reviewed and reconciled. Allergies: Allergies   Allergen Reactions    Latex Rash    Bacitracin     Cephalosporins Hives    Other      Black sutures  hand swelled up    Penicillins      Unknown       Physical Exam:  /76 (Site: Right Upper Arm, Position: Sitting, Cuff Size: Large Adult)   Pulse 74   Temp 96.9 °F (36.1 °C) (Temporal)   Ht 5' 9\" (1.753 m)   Wt 228 lb 1.6 oz (103.5 kg)   LMP 01/20/2014   SpO2 95%   BMI 33.68 kg/m²   GENERAL: Alert, oriented x 3, not in acute distress. HEENT: PERRLA; EOMI. Oropharynx clear. NECK: Supple. Without lymphadenopathy. LUNGS: Good air entry bilaterally. No wheezing, crackles or ronchi. CARDIOVASCULAR: Regular rate. No murmurs, rubs or gallops. ABDOMEN: Soft. Non-tender, non-distended. Positive bowel sounds. EXTREMITIES: Without clubbing, cyanosis, or edema. NEUROLOGIC: No focal deficits. ECOG 1    Impression/Plan:  61 y.o. female who underwent Needle localized left lumpectomy with sentinel lymph node excision on 10/18/2019. A. Left breast, needle localization excision/lumpectomy: Invasive ductal carcinoma, nuclear grade 2, see cancer case summary. Ductal carcinoma in situ, low grade, cribriform and solid types. Margins of excision are negative for involvement by invasive or in situ carcinoma. Microcalcifications are present in association with invasive carcinoma, ductal carcinoma in situ and benign breast ducts. Changes compatible with prior biopsy site. B. [place] lymph node #1, biopsy: 8 of 8 lymph nodes negative for involvement by carcinoma, sinus histiocytosis is present. CANCER CASE SUMMARY:  Procedure: Excision with needle localization  Specimen laterality: Left  Tumor site: 2:00 position (per history and physical examination per  patient's electronic medical record)  Tumor size: 7 mm in greatest dimension       Additional dimensions: 7 x 5 mm  Histologic type:  Invasive carcinoma of no special type (invasive ductal  carcinoma, not otherwise specified)  Histologic grade (White histologic score):   Glandular/tubular differentiation: Score 2   Nuclear pleomorphism: Score 2   Mitotic rate: Score 1   Overall grade: Grade 1 (score of 5)  Tumor focality: Single focus of invasive carcinoma  Ductal carcinoma in situ: Present, negative for extensive intraductal  component       Size/extent of DCIS: Less than 1 mm in any given tissue section,       DCIS is focally present in 6 of 10 tissue blocks       Architectural patterns: Solid and cribriform       Nuclear grade: Grade 1       Necrosis: Not identified  Lobular carcinoma in situ: Not identified  Invasive carcinoma margins:

## 2020-03-19 ENCOUNTER — TELEPHONE (OUTPATIENT)
Dept: RADIATION ONCOLOGY | Age: 60
End: 2020-03-19

## 2020-03-20 NOTE — PROGRESS NOTES
Subjective:      Patient ID: Mercy Dumont is a 61 y.o. female. HPI    Patient is here for evaluation left breast which is hard, swollen and red. She underwent a left breast needle localized lumpectomy, blue dye injection, left axillary sentinel lymph node excision on October 18, 2019. Pathological evaluation completed at [facility]):    Diagnosis:  A. Left breast, needle localization excision/lumpectomy: Invasive ductal  carcinoma, nuclear grade 2, see cancer case summary. Ductal carcinoma in situ, low grade, cribriform and solid types. Margins of excision are negative for involvement by invasive or in situ  carcinoma. Microcalcifications are present in association with invasive carcinoma,  ductal carcinoma in situ and benign breast ducts. Changes compatible with prior biopsy site. B. [place] lymph node #1, biopsy: 8 of 8 lymph nodes negative for  involvement by carcinoma, sinus histiocytosis is present. CANCER CASE SUMMARY:  Procedure: Excision with needle localization  Specimen laterality: Left  Tumor site: 2:00 position (per history and physical examination per  patient's electronic medical record)  Tumor size: 7 mm in greatest dimension       Additional dimensions: 7 x 5 mm  Histologic type:  Invasive carcinoma of no special type (invasive ductal  carcinoma, not otherwise specified)  Histologic grade (Sam histologic score):   Glandular/tubular differentiation: Score 2   Nuclear pleomorphism: Score 2   Mitotic rate: Score 1   Overall grade: Grade 1 (score of 5)  Tumor focality: Single focus of invasive carcinoma  Ductal carcinoma in situ: Present, negative for extensive intraductal  component       Size/extent of DCIS: Less than 1 mm in any given tissue section,       DCIS is focally present in 6 of 10 tissue blocks       Architectural patterns: Solid and cribriform       Nuclear grade: Grade 1       Necrosis: Not identified  Lobular carcinoma in situ: Not identified  Invasive carcinoma Marital status: Single     Spouse name: Not on file    Number of children: Not on file    Years of education: Not on file    Highest education level: Not on file   Occupational History    Occupation: unemployed (house keeper)   Social Needs    Financial resource strain: Not on file    Food insecurity     Worry: Not on file     Inability: Not on file   [employer] needs     Medical: Not on file     Non-medical: Not on file   Tobacco Use    Smoking status: Current Every Day Smoker     Packs/day: 0.50     Years: 30.00     Pack years: 15.00     Types: Cigarettes    Smokeless tobacco: Never Used    Tobacco comment: smoked since age 21   Substance and Sexual Activity    Alcohol use: Yes     Alcohol/week: 0.8 standard drinks     Types: 1 Standard drinks or equivalent per week     Comment: 3-4 beers weekly. 2-4 cups coffee per day. Pop daily    Drug use: Yes     Frequency: 4.0 times per week     Types: Marijuana     Comment: uses about 3 to 4 times week    Sexual activity: Not on file   Lifestyle    Physical activity     Days per week: Not on file     Minutes per session: Not on file    Stress: Not on file   Relationships    Social connections     Talks on phone: Not on file     Gets together: Not on file     Attends Gnosticism service: Not on file     Active member of club or organization: Not on file     Attends meetings of clubs or organizations: Not on file     Relationship status: Not on file    Intimate partner violence     Fear of current or ex partner: Not on file     Emotionally abused: Not on file     Physically abused: Not on file     Forced sexual activity: Not on file   Other Topics Concern    Not on file   Social History Narrative    Lives in [place], has a friend nearby. Review of Systems    CONSTITUTIONAL: No fever, chills. Good appetite and energy level. ENMT: Eyes: No diplopia; Nose: No epistaxis. Mouth: No sore throat. RESPIRATORY: No hemoptysis, shortness of breath, cough. CARDIOVASCULAR: No chest pain, pressure, or palpitations. GASTROINTESTINAL: No nausea/vomiting, abdominal pain, diarrhea/constipation. No blood in the stools. GENITOURINARY: No dysuria, urinary frequency, hematuria. NEURO: No syncope, presyncope, headache. Remainder:  ROS NEGATIVE    The patient voices no complaints. With questioning, she denies significant pain, fever, chills, wound drainage or discharge. Objective:   Physical Exam    Erythematous left breast with dependent edema and peau d'orange. No palpable masses. Slightly tender but not warm. Compatible with postradiation edema. Full range of motion left shoulder. Normal right breast.            Assessment:      61 y.o. woman who  is here for evaluation left breast which is hard, swollen and red. She underwent a left breast needle localized lumpectomy, blue dye injection, left axillary sentinel lymph node excision on October 18, 2019. Completed radiation in December, has not been wearing a bra. Has erythema and skin edema of the left breast.      Pathological evaluation completed at [facility]):  Diagnosis:  A. Left breast, needle localization excision/lumpectomy: Invasive ductal  carcinoma, nuclear grade 2, see cancer case summary. Ductal carcinoma in situ, low grade, cribriform and solid types. Margins of excision are negative for involvement by invasive or in situ  carcinoma. Microcalcifications are present in association with invasive carcinoma,  ductal carcinoma in situ and benign breast ducts. Changes compatible with prior biopsy site. B. [place] lymph node #1, biopsy: 8 of 8 lymph nodes negative for  involvement by carcinoma, sinus histiocytosis is present.     CANCER CASE SUMMARY:  Procedure: Excision with needle localization  Specimen laterality: Left  Tumor site: 2:00 position (per history and physical examination per  patient's electronic medical record)  Tumor size: 7 mm in greatest dimension       Additional Prescription submitted electronically. Patient photographed, see media tab. Patient will notify us by phone in 2 weeks of her progress. If erythema does not resolve with conservative measures will consider skin biopsy. Already has office appointment scheduled with us for May. Questions answered to patient satisfaction. Plan:        1. Wear a bra 24/7. Complete 10-day course of clindamycin. 2. Continue monthly breast self examination. Bring any changes to your physician's attention. 3. Routine screening imaging in May 2020.  4. Range of motion exercises for left shoulder encouraged. Lymphedema precautions discussed. 5. Education/Lifestyle recommendations: A regular exercise program is encouraged. Avoid excessive caffeine intake. Maintain a diet rich in vegetables and fruits avoiding processed and fast food. 6. Consultations: Oncology appointment will be scheduled with Oncologist of patient's and/or PCP's preference. 7. Continue regular appointments with PCP Office follow-up visit  scheduled in May 2020 and PRN. I personally and independently saw and examined patient and reviewed all pertinent laboratory data and imaging studies. I have reviewed and agree with the CNP history and review of systems as documented in the above note. This document is generated, in part, by voice recognition software and thus syntax and grammatical errors are possible. Amber Harding MD [facility]  March 23, 2020

## 2020-03-23 ENCOUNTER — OFFICE VISIT (OUTPATIENT)
Dept: BREAST CENTER | Age: 60
End: 2020-03-23
Payer: MEDICARE

## 2020-03-23 VITALS
WEIGHT: 238 LBS | SYSTOLIC BLOOD PRESSURE: 136 MMHG | OXYGEN SATURATION: 99 % | TEMPERATURE: 98 F | RESPIRATION RATE: 18 BRPM | BODY MASS INDEX: 35.25 KG/M2 | HEIGHT: 69 IN | DIASTOLIC BLOOD PRESSURE: 72 MMHG | HEART RATE: 72 BPM

## 2020-03-23 PROCEDURE — 3017F COLORECTAL CA SCREEN DOC REV: CPT | Performed by: SURGERY

## 2020-03-23 PROCEDURE — G8427 DOCREV CUR MEDS BY ELIG CLIN: HCPCS | Performed by: SURGERY

## 2020-03-23 PROCEDURE — G8484 FLU IMMUNIZE NO ADMIN: HCPCS | Performed by: SURGERY

## 2020-03-23 PROCEDURE — 99213 OFFICE O/P EST LOW 20 MIN: CPT

## 2020-03-23 PROCEDURE — 99213 OFFICE O/P EST LOW 20 MIN: CPT | Performed by: SURGERY

## 2020-03-23 PROCEDURE — 4004F PT TOBACCO SCREEN RCVD TLK: CPT | Performed by: SURGERY

## 2020-03-23 PROCEDURE — G8417 CALC BMI ABV UP PARAM F/U: HCPCS | Performed by: SURGERY

## 2020-03-23 RX ORDER — CLINDAMYCIN HYDROCHLORIDE 150 MG/1
300 CAPSULE ORAL 3 TIMES DAILY
Qty: 60 CAPSULE | Refills: 0 | Status: SHIPPED | OUTPATIENT
Start: 2020-03-23 | End: 2020-04-02

## 2020-03-23 RX ORDER — ASCORBIC ACID 500 MG
500 TABLET ORAL DAILY
COMMUNITY
End: 2021-12-13

## 2020-03-23 NOTE — LETTER
[facility]  [address]  [place] [address]  Phone: [phone]  Fax: [phone]    Jael Powers MD        March 23, 2020     Hadley Otero MD  [address] [place] [address] [place] [id number]    Patient: Ra Chan  MR Number: <[id number]>  YOB: 1960  Date of Visit: 3/23/2020    Dear Dr. Liya Ruiz LAPPING:     Ra Chan opted to see us today for left breast swelling and erythema. Below are the relevant portions of my assessment and plan of care.    61 y.o. woman who  is here for evaluation left breast which is hard, swollen and red. She underwent a left breast needle localized lumpectomy, blue dye injection, left axillary sentinel lymph node excision on October 18, 2019. Completed radiation in December, has not been wearing a bra. Has erythema and skin edema of the left breast.       Pathological evaluation completed at [facility]):  Diagnosis:  A. Left breast, needle localization excision/lumpectomy: Invasive ductal  carcinoma, nuclear grade 2, see cancer case summary. Ductal carcinoma in situ, low grade, cribriform and solid types. Margins of excision are negative for involvement by invasive or in situ  carcinoma. Microcalcifications are present in association with invasive carcinoma,  ductal carcinoma in situ and benign breast ducts. Changes compatible with prior biopsy site. B. [place] lymph node #1, biopsy: 8 of 8 lymph nodes negative for  involvement by carcinoma, sinus histiocytosis is present. CANCER CASE SUMMARY:  Procedure: Excision with needle localization  Specimen laterality: Left  Tumor site: 2:00 position (per history and physical examination per  patient's electronic medical record)  Tumor size: 7 mm in greatest dimension       Additional dimensions: 7 x 5 mm  Histologic type:  Invasive carcinoma of no special type (invasive ductal  carcinoma, not otherwise specified)  Histologic grade ([place] histologic score):  Glandular/tubular differentiation: Score 2   Nuclear pleomorphism: Score 2   Mitotic rate: Score 1   Overall grade: Grade 1 (score of 5)  Tumor focality: Single focus of invasive carcinoma  Ductal carcinoma in situ: Present, negative for extensive intraductal  component       Size/extent of DCIS: Less than 1 mm in any given tissue section,       DCIS is focally present in 6 of 10 tissue blocks       Architectural patterns: Solid and cribriform       Nuclear grade: Grade 1       Necrosis: Not identified  Lobular carcinoma in situ: Not identified  Invasive carcinoma margins: Uninvolved by invasive carcinoma   Distance from closest margin: 5 mm from closest superior margin  DCIS margins: Uninvolved by DCIS   Distance from closest margin: 2 mm from the closest medial margin  Regional lymph nodes: Uninvolved by tumor cells   Total number of lymph nodes examined: 8       Number of sentinel lymph nodes examined: 8  Treatment effect: No known presurgical therapy  Lymphovascular invasion: Not identified  Pathologic stage classification (pTNM, AJCC 8th Edition): pT1b, pN0  Additional pathologic findings: None  Ancillary studies: Previously performed on left breast core needle biopsy  ([facility]-); please refer to the original core needle biopsy surgical  pathology report for results and complete details. Microcalcifications: Present in invasive carcinoma, DCIS and  nonneoplastic tissue   Oncotype DX Breast Cancer Report-Node Negative   Recurrence Score Result-18   Distant recurrence risk at 9 years- 5%   Absolute chemotherapy benefit- <1%     Current presentation compatible with  post radiation edema. Recommended wearing a bra 24/7 to protect incision. In light of diffuse erythema will start her on clindamycin for a 10-day course. Prescription submitted electronically. Patient photographed, see media tab. Patient will notify us by phone in 2 weeks of her progress.   If erythema does not resolve with conservative measures will consider skin biopsy. Already has office appointment scheduled with us for May. Questions answered to patient satisfaction. If you have questions, please do not hesitate to call me. I look forward to following Douglass Favre along with you.     Sincerely,  Mike Sanchez MD

## 2020-05-04 ENCOUNTER — APPOINTMENT (OUTPATIENT)
Dept: RADIATION ONCOLOGY | Age: 60
End: 2020-05-04
Attending: RADIOLOGY
Payer: MEDICARE

## 2020-05-27 ENCOUNTER — TELEPHONE (OUTPATIENT)
Dept: CASE MANAGEMENT | Age: 60
End: 2020-05-27

## 2020-06-23 ENCOUNTER — HOSPITAL ENCOUNTER (OUTPATIENT)
Age: 60
Discharge: HOME | End: 2020-06-23
Payer: MEDICARE

## 2020-06-23 LAB
ALBUMIN SERPL-MCNC: 3.8 G/DL (ref 3.5–5.2)
ALP BLD-CCNC: 123 U/L (ref 35–104)
ALT SERPL-CCNC: 13 U/L (ref 0–32)
ANION GAP SERPL CALCULATED.3IONS-SCNC: 12 MMOL/L (ref 7–16)
AST SERPL-CCNC: 16 U/L (ref 0–31)
BASOPHILS ABSOLUTE: 0.06 E9/L (ref 0–0.2)
BASOPHILS RELATIVE PERCENT: 0.9 % (ref 0–2)
BILIRUB SERPL-MCNC: 0.4 MG/DL (ref 0–1.2)
BUN BLDV-MCNC: 15 MG/DL (ref 6–20)
CALCIUM SERPL-MCNC: 9.2 MG/DL (ref 8.6–10.2)
CHLORIDE BLD-SCNC: 104 MMOL/L (ref 98–107)
CO2: 23 MMOL/L (ref 22–29)
CREAT SERPL-MCNC: 0.8 MG/DL (ref 0.5–1)
EOSINOPHILS ABSOLUTE: 0.13 E9/L (ref 0.05–0.5)
EOSINOPHILS RELATIVE PERCENT: 1.9 % (ref 0–6)
GFR AFRICAN AMERICAN: >60
GFR NON-AFRICAN AMERICAN: >60 ML/MIN/1.73
GLUCOSE BLD-MCNC: 97 MG/DL (ref 74–99)
HCT VFR BLD CALC: 43.8 % (ref 34–48)
HEMOGLOBIN: 13.9 G/DL (ref 11.5–15.5)
IMMATURE GRANULOCYTES #: 0.03 E9/L
IMMATURE GRANULOCYTES %: 0.4 % (ref 0–5)
LYMPHOCYTES ABSOLUTE: 1.75 E9/L (ref 1.5–4)
LYMPHOCYTES RELATIVE PERCENT: 26.2 % (ref 20–42)
MCH RBC QN AUTO: 30.6 PG (ref 26–35)
MCHC RBC AUTO-ENTMCNC: 31.7 % (ref 32–34.5)
MCV RBC AUTO: 96.5 FL (ref 80–99.9)
MONOCYTES ABSOLUTE: 0.67 E9/L (ref 0.1–0.95)
MONOCYTES RELATIVE PERCENT: 10 % (ref 2–12)
NEUTROPHILS ABSOLUTE: 4.05 E9/L (ref 1.8–7.3)
NEUTROPHILS RELATIVE PERCENT: 60.6 % (ref 43–80)
PDW BLD-RTO: 13 FL (ref 11.5–15)
PLATELET # BLD: 295 E9/L (ref 130–450)
PMV BLD AUTO: 9.3 FL (ref 7–12)
POTASSIUM SERPL-SCNC: 4.5 MMOL/L (ref 3.5–5)
RBC # BLD: 4.54 E12/L (ref 3.5–5.5)
SODIUM BLD-SCNC: 139 MMOL/L (ref 132–146)
TOTAL PROTEIN: 6.8 G/DL (ref 6.4–8.3)
WBC # BLD: 6.7 E9/L (ref 4.5–11.5)

## 2020-06-23 PROCEDURE — 80053 COMPREHEN METABOLIC PANEL: CPT

## 2020-06-23 PROCEDURE — 85025 COMPLETE CBC W/AUTO DIFF WBC: CPT

## 2020-06-23 PROCEDURE — 36415 COLL VENOUS BLD VENIPUNCTURE: CPT

## 2020-06-26 ENCOUNTER — OFFICE VISIT (OUTPATIENT)
Dept: ONCOLOGY | Age: 60
End: 2020-06-26
Payer: MEDICARE

## 2020-06-26 VITALS
DIASTOLIC BLOOD PRESSURE: 77 MMHG | TEMPERATURE: 97.2 F | HEART RATE: 93 BPM | OXYGEN SATURATION: 98 % | BODY MASS INDEX: 34.73 KG/M2 | SYSTOLIC BLOOD PRESSURE: 134 MMHG | HEIGHT: 69 IN | WEIGHT: 234.5 LBS

## 2020-06-26 PROCEDURE — 3017F COLORECTAL CA SCREEN DOC REV: CPT | Performed by: INTERNAL MEDICINE

## 2020-06-26 PROCEDURE — 99212 OFFICE O/P EST SF 10 MIN: CPT

## 2020-06-26 PROCEDURE — 4004F PT TOBACCO SCREEN RCVD TLK: CPT | Performed by: INTERNAL MEDICINE

## 2020-06-26 PROCEDURE — 99214 OFFICE O/P EST MOD 30 MIN: CPT | Performed by: INTERNAL MEDICINE

## 2020-06-26 PROCEDURE — G8417 CALC BMI ABV UP PARAM F/U: HCPCS | Performed by: INTERNAL MEDICINE

## 2020-06-26 PROCEDURE — G8428 CUR MEDS NOT DOCUMENT: HCPCS | Performed by: INTERNAL MEDICINE

## 2020-06-26 RX ORDER — ANASTROZOLE 1 MG/1
1 TABLET ORAL DAILY
Qty: 90 TABLET | Refills: 1 | Status: SHIPPED
Start: 2020-06-26 | End: 2021-03-04 | Stop reason: SDUPTHER

## 2020-06-26 RX ORDER — VENLAFAXINE HYDROCHLORIDE 75 MG/1
CAPSULE, EXTENDED RELEASE ORAL
COMMUNITY
Start: 2020-06-14

## 2020-06-26 NOTE — PROGRESS NOTES
carcinoma, ductal carcinoma in situ and benign breast ducts. Changes compatible with prior biopsy site. B. [place] lymph node #1, biopsy: 8 of 8 lymph nodes negative for involvement by carcinoma, sinus histiocytosis is present. CANCER CASE SUMMARY:  Procedure: Excision with needle localization  Specimen laterality: Left  Tumor site: 2:00 position (per history and physical examination per  patient's electronic medical record)  Tumor size: 7 mm in greatest dimension       Additional dimensions: 7 x 5 mm  Histologic type:  Invasive carcinoma of no special type (invasive ductal  carcinoma, not otherwise specified)  Histologic grade (Sam histologic score):   Glandular/tubular differentiation: Score 2   Nuclear pleomorphism: Score 2   Mitotic rate: Score 1   Overall grade: Grade 1 (score of 5)  Tumor focality: Single focus of invasive carcinoma  Ductal carcinoma in situ: Present, negative for extensive intraductal  component       Size/extent of DCIS: Less than 1 mm in any given tissue section,       DCIS is focally present in 6 of 10 tissue blocks       Architectural patterns: Solid and cribriform       Nuclear grade: Grade 1       Necrosis: Not identified  Lobular carcinoma in situ: Not identified  Invasive carcinoma margins: Uninvolved by invasive carcinoma   Distance from closest margin: 5 mm from closest superior margin  DCIS margins: Uninvolved by DCIS   Distance from closest margin: 2 mm from the closest medial margin  Regional lymph nodes: Uninvolved by tumor cells   Total number of lymph nodes examined: 8       Number of sentinel lymph nodes examined: 8  Treatment effect: No known presurgical therapy  Lymphovascular invasion: Not identified  Pathologic stage classification (pTNM, AJCC 8th Edition):   pT1b, pN0    Oncotype DX Breast Cancer Report-Node Negative  Recurrence Score Result-18  Distant recurrence risk at 9 years- 5%  Absolute chemotherapy benefit- <1%    NCCN guidelines reviewed with

## 2020-06-29 ENCOUNTER — TELEPHONE (OUTPATIENT)
Dept: CASE MANAGEMENT | Age: 60
End: 2020-06-29

## 2020-07-08 ENCOUNTER — HOSPITAL ENCOUNTER (OUTPATIENT)
Dept: GENERAL RADIOLOGY | Age: 60
Discharge: HOME | End: 2020-07-10
Payer: MEDICARE

## 2020-07-08 ENCOUNTER — OFFICE VISIT (OUTPATIENT)
Dept: BREAST CENTER | Age: 60
End: 2020-07-08
Payer: MEDICARE

## 2020-07-08 VITALS
TEMPERATURE: 96.8 F | BODY MASS INDEX: 34.66 KG/M2 | WEIGHT: 234 LBS | RESPIRATION RATE: 18 BRPM | SYSTOLIC BLOOD PRESSURE: 124 MMHG | DIASTOLIC BLOOD PRESSURE: 76 MMHG | HEIGHT: 69 IN | HEART RATE: 70 BPM | OXYGEN SATURATION: 98 %

## 2020-07-08 PROCEDURE — G8417 CALC BMI ABV UP PARAM F/U: HCPCS | Performed by: NURSE PRACTITIONER

## 2020-07-08 PROCEDURE — G8427 DOCREV CUR MEDS BY ELIG CLIN: HCPCS | Performed by: NURSE PRACTITIONER

## 2020-07-08 PROCEDURE — 99214 OFFICE O/P EST MOD 30 MIN: CPT | Performed by: NURSE PRACTITIONER

## 2020-07-08 PROCEDURE — 77067 SCR MAMMO BI INCL CAD: CPT

## 2020-07-08 PROCEDURE — 99213 OFFICE O/P EST LOW 20 MIN: CPT | Performed by: NURSE PRACTITIONER

## 2020-07-08 PROCEDURE — 4004F PT TOBACCO SCREEN RCVD TLK: CPT | Performed by: NURSE PRACTITIONER

## 2020-07-08 PROCEDURE — 3017F COLORECTAL CA SCREEN DOC REV: CPT | Performed by: NURSE PRACTITIONER

## 2020-07-08 ASSESSMENT — ENCOUNTER SYMPTOMS
EYE ITCHING: 0
ABDOMINAL DISTENTION: 0
BLOOD IN STOOL: 0
EYE DISCHARGE: 0
SINUS PAIN: 0
TROUBLE SWALLOWING: 0
RHINORRHEA: 0
VOMITING: 0
SHORTNESS OF BREATH: 0
CHOKING: 0
NAUSEA: 0
DIARRHEA: 0
WHEEZING: 0
CONSTIPATION: 0
SINUS PRESSURE: 0
CHEST TIGHTNESS: 0
SORE THROAT: 0
VOICE CHANGE: 0
ABDOMINAL PAIN: 0
COUGH: 0
BACK PAIN: 0

## 2020-07-08 NOTE — PROGRESS NOTES
Subjective:  Left breast Invasive ductal carcinoma admixed with ductal carcinoma in situ (DCIS)  -Oncotype DX 18  -RT was started on 12/09/2019 and completed on 12/31/2019  -DEXA scan 11/22/2019 normal in LS and hips. -ET:  Arimidex 1 mg po daily was started on 01/14/2020       Patient ID: Gary Fisher is a 61 y.o. female. HPI  61 y.o. who was noted to have a mass on mammogram.  The mass was located in the 2 o'clock position of left breast.  Breast cancer risk factors include age, gender, menopause after 48, family history of breast and pancreatic cancer. Ashkenazi [employer] Ancestry: No     Bilateral Screening Mammogram 05/21/2019: There is an irregular mass measuring 7 mm seen in the posterior upper outer quadrant of the left breast.   Left Breast US 05/31/2019: there is an irregular solid mass with angular margins measuring 8 x 6 x 7 mm in the left breast at 2 o'clock located 8 centimeters from the nipple. Internal echogenicity is hypoechoic. There is no axillary lymphadenopathy.     Left Breast Mass Biopsy 05/31/2019:   Breast, left mass at 2:00, biopsy:  Invasive ductal carcinoma admixed with ductal carcinoma in situ (DCIS)  with microcalcifications, see comment. Comment: The invasive ductal carcinoma is [place] grade 1 (score of 4): glandular differentiation score 1, nuclear pleomorphism score 2, mitotic activity score 1. The DCIS is cribriform type with microcalcifications, lacking necrosis. Immunostain for p63 shows negative staining in the invasive carcinoma. Breast Cancer Marker Studies:  ER:Positive:100%  [place]:Positive:70%  Her-2/leslie (c-erb B-2) protein expression:  Negative (Score 0)      Needle localized left lumpectomy with sentinel lymph node excision on 10/18/2019. A. Left breast, needle localization excision/lumpectomy: Invasive ductal carcinoma, nuclear grade 2, see cancer case summary. Ductal carcinoma in situ, low grade, cribriform and solid types.   Margins of excision are negative for involvement by invasive or in situ carcinoma. Microcalcifications are present in association with invasive carcinoma, ductal carcinoma in situ and benign breast ducts. Changes compatible with prior biopsy site. B. [place] lymph node #1, biopsy: 8 of 8 lymph nodes negative for involvement by carcinoma, sinus histiocytosis is present.     CANCER CASE SUMMARY:  Procedure: Excision with needle localization  Specimen laterality: Left  Tumor site: 2:00 position (per history and physical examination per  patient's electronic medical record)  Tumor size: 7 mm in greatest dimension       Additional dimensions: 7 x 5 mm  Histologic type:  Invasive carcinoma of no special type (invasive ductal  carcinoma, not otherwise specified)  Histologic grade ([place] histologic score):   Glandular/tubular differentiation: Score 2   Nuclear pleomorphism: Score 2   Mitotic rate: Score 1   Overall grade: Grade 1 (score of 5)  Tumor focality: Single focus of invasive carcinoma  Ductal carcinoma in situ: Present, negative for extensive intraductal  component       Size/extent of DCIS: Less than 1 mm in any given tissue section,       DCIS is focally present in 6 of 10 tissue blocks       Architectural patterns: Solid and cribriform       Nuclear grade: Grade 1       Necrosis: Not identified  Lobular carcinoma in situ: Not identified  Invasive carcinoma margins: Uninvolved by invasive carcinoma   Distance from closest margin: 5 mm from closest superior margin  DCIS margins: Uninvolved by DCIS   Distance from closest margin: 2 mm from the closest medial margin  Regional lymph nodes: Uninvolved by tumor cells   Total number of lymph nodes examined: 8       Number of sentinel lymph nodes examined: 8  Treatment effect: No known presurgical therapy  Lymphovascular invasion: Not identified  Pathologic stage classification (pTNM, AJCC 8th Edition):   pT1b, pN0     Oncotype DX Breast Cancer Report-Node Negative  Recurrence Score needed for Pain      Glucos-Chondroit-Hyaluron-MSM (GLUCOSAMINE CHONDROITIN JOINT PO) Take by mouth      HYDROXYZINE PAMOATE PO Take by mouth 2 times daily as needed       omeprazole (PRILOSEC) 20 MG delayed release capsule Take 1 capsule by mouth 2 times daily 30 capsule 3    amLODIPine-benazepril (LOTREL) 5-40 MG per capsule TK 1 C PO QD  0    BREO ELLIPTA 100-25 MCG/INH AEPB inhaler Inhale 2 puffs into the lungs daily       VENTOLIN  (90 Base) MCG/ACT inhaler Inhale 1 puff into the lungs every 4 hours as needed       buPROPion (WELLBUTRIN XL) 150 MG extended release tablet TK 1 T PO  QAM  3     No current facility-administered medications for this visit. Allergies   Allergen Reactions    Latex Rash    Cephalosporins Hives    Other      Black sutures  hand swelled up    Penicillins      Unknown         Family History   Problem Relation Age of Onset    Diabetes Mother     High Blood Pressure Mother     Cancer Mother 70        pancreatic    Arthritis Father     High Blood Pressure Father     Heart Disease Father         anurism 52    Cancer Maternal Grandmother 79        breast       Social History     Socioeconomic History    Marital status: Single     Spouse name: Not on file    Number of children: Not on file    Years of education: Not on file    Highest education level: Not on file   Occupational History    Occupation: unemployed (house keeper)   Social Needs    Financial resource strain: Not on file    Food insecurity     Worry: Not on file     Inability: Not on file   [employer] needs     Medical: Not on file     Non-medical: Not on file   Tobacco Use    Smoking status: Current Every Day Smoker     Packs/day: 0.25     Years: 30.00     Pack years: 7.50     Types: Cigarettes    Smokeless tobacco: Never Used    Tobacco comment: smoked since age 21   Substance and Sexual Activity    Alcohol use:  Yes     Alcohol/week: 0.8 standard drinks     Types: 1 Standard drinks or equivalent per week     Comment: 3-4 beers weekly. 2-4 cups coffee per day. Pop daily    Drug use: Not Currently     Frequency: 4.0 times per week     Types: Marijuana     Comment: uses about 3 to 4 times week    Sexual activity: Not on file   Lifestyle    Physical activity     Days per week: Not on file     Minutes per session: Not on file    Stress: Not on file   Relationships    Social connections     Talks on phone: Not on file     Gets together: Not on file     Attends Mu-ism service: Not on file     Active member of club or organization: Not on file     Attends meetings of clubs or organizations: Not on file     Relationship status: Not on file    Intimate partner violence     Fear of current or ex partner: Not on file     Emotionally abused: Not on file     Physically abused: Not on file     Forced sexual activity: Not on file   Other Topics Concern    Not on file   Social History Narrative    Lives in [place], has a friend nearby. Review of Systems   Constitutional: Negative for activity change, appetite change, chills, fatigue, fever and unexpected weight change. Doing well aside from chronic right hip pain; reports a history of cortisone shot in the right hip with no relief. HENT: Negative for congestion, postnasal drip, rhinorrhea, sinus pressure, sinus pain, sore throat, trouble swallowing and voice change. Eyes: Negative for discharge, itching and visual disturbance. Respiratory: Negative for cough, choking, chest tightness, shortness of breath and wheezing. Cardiovascular: Negative for chest pain, palpitations and leg swelling. Gastrointestinal: Negative for abdominal distention, abdominal pain, blood in stool, constipation, diarrhea, nausea and vomiting. Endocrine: Negative for cold intolerance and heat intolerance. Genitourinary: Negative for difficulty urinating, dysuria, frequency and hematuria.    Musculoskeletal: Negative for arthralgias, back pain, gait problem, joint swelling, myalgias, neck pain and neck stiffness. Allergic/Immunologic: Negative for environmental allergies and food allergies. Neurological: Negative for dizziness, seizures, syncope, speech difficulty, weakness, light-headedness and headaches. Hematological: Negative for adenopathy. Does not bruise/bleed easily. Psychiatric/Behavioral: Negative for agitation, confusion and decreased concentration. The patient is not nervous/anxious. Objective:   Physical Exam  Vitals signs and nursing note reviewed. Constitutional:       General: She is not in acute distress. Appearance: She is well-developed. She is not diaphoretic. Comments: ECOG 1 d/t hip pain   HENT:      Head: Normocephalic and atraumatic. Mouth/Throat:      Pharynx: No oropharyngeal exudate. Eyes:      General: No scleral icterus. Right eye: No discharge. Left eye: No discharge. Conjunctiva/sclera: Conjunctivae normal.   Neck:      Musculoskeletal: Normal range of motion and neck supple. Thyroid: No thyromegaly. Vascular: No JVD. Trachea: No tracheal deviation. Cardiovascular:      Rate and Rhythm: Normal rate and regular rhythm. Heart sounds: No murmur. No friction rub. No gallop. Pulmonary:      Effort: Pulmonary effort is normal. No respiratory distress or retractions. Breath sounds: Normal breath sounds. No stridor. No wheezing or rales. Chest:      Chest wall: No mass, lacerations, deformity, swelling, tenderness or edema. Breasts: Breasts are symmetrical.         Right: No inverted nipple, mass, nipple discharge, skin change or tenderness. Left: No inverted nipple, mass, nipple discharge, skin change or tenderness. Comments: Right supple. No skin dimpling or puckering. No nipple discharge. No clinically suspicious lumps nodules or masses appreciated. No axillary lymphadenopathy. Abdominal:      General: There is no distension. Palpations: Abdomen is soft. Tenderness: There is no abdominal tenderness. There is no guarding or rebound. Musculoskeletal: Normal range of motion. General: No tenderness or deformity. Right shoulder: Normal.      Left shoulder: Normal.   Lymphadenopathy:      Cervical: No cervical adenopathy. Right cervical: No superficial, deep or posterior cervical adenopathy. Left cervical: No superficial, deep or posterior cervical adenopathy. Upper Body:      Right upper body: No pectoral adenopathy. Left upper body: No pectoral adenopathy. Skin:     General: Skin is warm and dry. Coloration: Skin is not pale. Findings: No erythema or rash. Neurological:      Mental Status: She is alert and oriented to person, place, and time. Coordination: Coordination normal.   Psychiatric:         Behavior: Behavior normal.         Thought Content: Thought content normal.         Judgment: Judgment normal.        Assessment:      61 y.o. female who underwent Needle localized left lumpectomy with sentinel lymph node excision on 10/18/2019. A. Left breast, needle localization excision/lumpectomy: Invasive ductal carcinoma, nuclear grade 2, see cancer case summary. Ductal carcinoma in situ, low grade, cribriform and solid types. Margins of excision are negative for involvement by invasive or in situ carcinoma. Microcalcifications are present in association with invasive carcinoma, ductal carcinoma in situ and benign breast ducts. Changes compatible with prior biopsy site. B.  [place] lymph node #1, biopsy: 8 of 8 lymph nodes negative for involvement by carcinoma, sinus histiocytosis is present.     CANCER CASE SUMMARY:  Procedure: Excision with needle localization  Specimen laterality: Left  Tumor site: 2:00 position (per history and physical examination per  patient's electronic medical record)  Tumor size: 7 mm in greatest dimension       Additional dimensions: 7 x 5 mm  Histologic type: Invasive carcinoma of no special type (invasive ductal  carcinoma, not otherwise specified)  Histologic grade ([place] histologic score):   Glandular/tubular differentiation: Score 2   Nuclear pleomorphism: Score 2   Mitotic rate: Score 1   Overall grade: Grade 1 (score of 5)  Tumor focality: Single focus of invasive carcinoma  Ductal carcinoma in situ: Present, negative for extensive intraductal  component       Size/extent of DCIS: Less than 1 mm in any given tissue section,       DCIS is focally present in 6 of 10 tissue blocks       Architectural patterns: Solid and cribriform       Nuclear grade: Grade 1       Necrosis: Not identified  Lobular carcinoma in situ: Not identified  Invasive carcinoma margins: Uninvolved by invasive carcinoma   Distance from closest margin: 5 mm from closest superior margin  DCIS margins: Uninvolved by DCIS   Distance from closest margin: 2 mm from the closest medial margin  Regional lymph nodes: Uninvolved by tumor cells   Total number of lymph nodes examined: 8       Number of sentinel lymph nodes examined: 8  Treatment effect: No known presurgical therapy  Lymphovascular invasion: Not identified  Pathologic stage classification (pTNM, AJCC 8th Edition):   pT1b, pN0     Breast Cancer Marker Studies:  ER:Positive:100%  [place]:Positive:70%  Her-2/leslie (c-erb B-2) protein expression:  Negative (Score 0)      Oncotype DX Recurrence Score Result-18  NCCN guidelines reviewed with patient. No indication for adjuvant chemotherapy; Recommended adjuvant RT followed by hormonal therapy (Arimidex 1 mg p.o. daily for 5 years).       RT was started on 12/09/2019 and completed on 12/31/2019  DEXA scan 11/22/2019 normal in LS and hips. Arimidex 1 mg po daily was started on 01/14/2020 with good tolerance to date. On 03/20/2020 she presented with left breast which is hard, swollen and red. -B/L screening mammogram today, 07/08/2020:  Benign.       Clinically, she has

## 2020-07-15 ENCOUNTER — TELEPHONE (OUTPATIENT)
Dept: BREAST CENTER | Age: 60
End: 2020-07-15

## 2020-07-22 ENCOUNTER — HOSPITAL ENCOUNTER (OUTPATIENT)
Age: 60
Discharge: HOME | End: 2020-07-24
Payer: MEDICARE

## 2020-07-22 ENCOUNTER — HOSPITAL ENCOUNTER (OUTPATIENT)
Dept: GENERAL RADIOLOGY | Age: 60
Discharge: HOME | End: 2020-07-24
Payer: MEDICARE

## 2020-07-22 PROCEDURE — 73502 X-RAY EXAM HIP UNI 2-3 VIEWS: CPT

## 2020-07-23 ENCOUNTER — TELEPHONE (OUTPATIENT)
Dept: BREAST CENTER | Age: 60
End: 2020-07-23

## 2020-07-23 NOTE — TELEPHONE ENCOUNTER
Notified patient of the xray results of the right hip. Discussed with Roosevelt Gross NP. She has an appointment with ortho (Dr. Allen Cedeno) 7/28/2020.

## 2020-07-28 ENCOUNTER — OFFICE VISIT (OUTPATIENT)
Dept: ORTHOPEDIC SURGERY | Age: 60
End: 2020-07-28
Payer: MEDICARE

## 2020-07-28 VITALS — BODY MASS INDEX: 34.66 KG/M2 | WEIGHT: 234 LBS | HEIGHT: 69 IN | TEMPERATURE: 98 F

## 2020-07-28 PROCEDURE — 99203 OFFICE O/P NEW LOW 30 MIN: CPT | Performed by: ORTHOPAEDIC SURGERY

## 2020-07-28 PROCEDURE — 4004F PT TOBACCO SCREEN RCVD TLK: CPT | Performed by: ORTHOPAEDIC SURGERY

## 2020-07-28 PROCEDURE — 3017F COLORECTAL CA SCREEN DOC REV: CPT | Performed by: ORTHOPAEDIC SURGERY

## 2020-07-28 PROCEDURE — G8417 CALC BMI ABV UP PARAM F/U: HCPCS | Performed by: ORTHOPAEDIC SURGERY

## 2020-07-28 PROCEDURE — G8427 DOCREV CUR MEDS BY ELIG CLIN: HCPCS | Performed by: ORTHOPAEDIC SURGERY

## 2020-07-28 NOTE — PROGRESS NOTES
Chief Complaint   Patient presents with    Hip Pain     Right Hip, x years, previously seen  and Ady Montano, Has Breast Cancer        Danelle Celaya  Is a 61 y.o.  female who presents today complaining of right hip pain. She states that the pain began 7  year(s) ago. She did not have a history of injury. Patients states pain is located anterior and has tenderness over the  Anterior portion of the hip. Hip pain is described as aching, sharp, shooting and stabbing and  is worse with weight bearing along with groin discomfort. She states the pain occurs in the  no apparent pattern. Patient states hip pain is relieved by rest, heat, ice, cortisone injection. Patient does have a history of back pain. The patient does use ambulatory aid, cane. 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 Past Medical History:   Diagnosis Date    Anxiety     Arthritis     Back pain     Cancer ([facility] 2019    breast    Chronic obstructive pulmonary disease (COPD) ([facility] 2017    Depression     Diverticulosis     Hip pain, bilateral     Right worse than left    Hypertension     Moderate obesity 2017    Osteoarthritis of right hip 2012    Tobacco abuse      Past Surgical History:   Procedure Laterality Date    BREAST BIOPSY Left 10/18/2019    LEFT BREAST NEEDLE LOCALIZED LUMPECTOMY BLUE DYE INJECTION LEFT AXILL.   SENTINEL LYMPH  NODE EXCISION performed by Yessica Grigsby MD at [facility] COLONOSCOPY  13    INDUCED   age 16    TX COLONOSCOPY FLX DX W/COLLJ SPEC WHEN PFRMD N/A 2018    COLONOSCOPY performed by Sho Oates MD at [address] [place] [address],[address]  2018    bx done antrum    UPPER GASTROINTESTINAL ENDOSCOPY N/A 2018    EGD BIOPSY performed by Sho Oates MD at [facility]       Current Outpatient Medications:     venlafaxine (EFFEXOR XR) 75 MG extended release capsule, TK ONE C PO  QAM, Disp: , Rfl:     anastrozole (ARIMIDEX) 1 MG tablet, Take 1 tablet by mouth daily, Disp: 90 tablet, Rfl: 1    vitamin C (ASCORBIC ACID) 500 MG tablet, Take 500 mg by mouth daily, Disp: , Rfl:     amLODIPine (NORVASC) 5 MG tablet, TK 1 T PO QD, Disp: , Rfl:     REXULTI 0.5 MG TABS tablet, TK 1 T PO  QD, Disp: , Rfl:     calcium carbonate-vitamin D (CALTRATE) 600-400 MG-UNIT TABS per tab, Take 1 tablet by mouth 2 times daily, Disp: , Rfl:     acetaminophen (TYLENOL) 500 MG tablet, Take 500 mg by mouth every 6 hours as needed for Pain, Disp: , Rfl:     Glucos-Chondroit-Hyaluron-MSM (GLUCOSAMINE CHONDROITIN JOINT PO), Take by mouth, Disp: , Rfl:     HYDROXYZINE PAMOATE PO, Take by mouth 2 times daily as needed , Disp: , Rfl:     omeprazole (PRILOSEC) 20 MG delayed release capsule, Take 1 capsule by mouth 2 times daily, Disp: 30 capsule, Rfl: 3    amLODIPine-benazepril (LOTREL) 5-40 MG per capsule, TK 1 C PO QD, Disp: , Rfl: 0    BREO ELLIPTA 100-25 MCG/INH AEPB inhaler, Inhale 2 puffs into the lungs daily , Disp: , Rfl:     VENTOLIN  (90 Base) MCG/ACT inhaler, Inhale 1 puff into the lungs every 4 hours as needed , Disp: , Rfl:     buPROPion (WELLBUTRIN XL) 150 MG extended release tablet, TK 1 T PO  QAM, Disp: , Rfl: 3  Allergies   Allergen Reactions    Latex Rash    Cephalosporins Hives    Other      Black sutures  hand swelled up    Penicillins      Unknown       Social History     Socioeconomic History    Marital status: Single     Spouse name: Not on file    Number of children: Not on file    Years of education: Not on file    Highest education level: Not on file   Occupational History    Occupation: unemployed (house keeper)   Social Needs    Financial resource strain: Not on file    Food insecurity     Worry: Not on file     Inability: Not on file    Transportation needs     Medical: Not on file     Non-medical: Not on file   Tobacco Use    Smoking status: Current Every Day Smoker     Packs/day: 0.25     Years: 30.00     Pack years: 7.50     Types: Cigarettes    Smokeless tobacco: Never Used    Tobacco comment: smoked since age 21   Substance and Sexual Activity    Alcohol use: Yes     Alcohol/week: 0.8 standard drinks     Types: 1 Standard drinks or equivalent per week     Comment: 3-4 beers weekly. 2-4 cups coffee per day. Pop daily    Drug use: Not Currently     Frequency: 4.0 times per week     Types: Marijuana     Comment: uses about 3 to 4 times week    Sexual activity: Not on file   Lifestyle    Physical activity     Days per week: Not on file     Minutes per session: Not on file    Stress: Not on file   Relationships    Social connections     Talks on phone: Not on file     Gets together: Not on file     Attends Advent service: Not on file     Active member of club or organization: Not on file     Attends meetings of clubs or organizations: Not on file     Relationship status: Not on file    Intimate partner violence     Fear of current or ex partner: Not on file     Emotionally abused: Not on file     Physically abused: Not on file     Forced sexual activity: Not on file   Other Topics Concern    Not on file   Social History Narrative    Lives in [place], has a friend nearby. Family History   Problem Relation Age of Onset    Diabetes Mother     High Blood Pressure Mother     Cancer Mother 70        pancreatic    Arthritis Father     High Blood Pressure Father     Heart Disease Father         anurism 52    Cancer Maternal Grandmother 79        breast         REVIEW OF SYSTEMS:     General/Constitution:  (-)weight loss, (-)fever, (-)chills, (-)weakness. Skin: (-) rash,(-) psoriasis,(-) eczema, (-)skin cancer. Musculoskeletal: (-) fractures,  (-) dislocations,(-) collagen vascular disease, (-) fibromyalgia, (-) multiple sclerosis, (-) muscular dystrophy, (-) RSD,(-) joint pain (-)swelling, (-) joint pain,swelling.   Neurologic: (-) epilepsy, (-)seizures,(-) brain tumor,(-) TIA, (-)stroke, Musculoskeletal:  Gait: antalgic;  Examination of the digits and nails reveal no cyanosis or clubbing    Lumbar exam:  On visual inspection, there is not deformity of the spine. antalgic gait, limited range of motion. Special tests: Straight Leg Raise negative, Chelsey test negative. Hip exam:  Upon visual inspection there is not a deformity noted. Patient complains of tenderness at the  groin. Exam of the right hip shows none leg length discrepancy. Range of motion of the involved/uninvolved hip is 10 degrees internal rotation and 15 degrees external rotation/20 degrees internal rotation and 30 degrees external rotation, the hip joint is stable to testing. Strength of the lower extremity is limited by pain. The patient does not have ipsilateral knee pain, and is described as  none. Hip exam- Gait: antalgic; Strength: Hip Flexors 5/5; Hip Abductors 5/5; Hip Adduction 5/5. Knee exam :  Upon visual inspection there is not deformity noted. She does not have  pain on motion, there is not tenderness over the  medial, lateral, anterior region. Range of motion of R. Knee is 0 to 120, and L. Knee is 0 to 120. there are not any masses, there is not ligamentous instability, there is not  deformity noted. Xrays:  n/a    Radiographic findings reviewed with patient    Impression:  Encounter Diagnoses   Name Primary?  Primary osteoarthritis of right hip Yes       Plan:  Natural history and expected course discussed. Questions answered. Educational materials distributed. Home exercises discussed. NSAIDs per medication orders. I had a lengthy discussion with the patient regarding their diagnosis. I explained treatment options including surgical vs non surgical treatment. I reviewed in detail the risks and benefits and outlined the procedure in detail with expected outcomes and possible complications.   I also discussed non surgical treatment such as injections (CSI and visco supplementation), physical therapy, topical creams and NSAID's. They have elected for conservative management at this time.    Fu in 2 months   10/28/2020

## 2020-08-03 ENCOUNTER — HOSPITAL ENCOUNTER (OUTPATIENT)
Dept: RADIATION ONCOLOGY | Age: 60
End: 2020-08-03
Attending: RADIOLOGY
Payer: MEDICARE

## 2020-08-10 ENCOUNTER — HOSPITAL ENCOUNTER (OUTPATIENT)
Dept: RADIATION ONCOLOGY | Age: 60
End: 2020-08-10
Attending: RADIOLOGY
Payer: MEDICARE

## 2020-08-14 ENCOUNTER — HOSPITAL ENCOUNTER (OUTPATIENT)
Dept: RADIATION ONCOLOGY | Age: 60
Discharge: HOME | End: 2020-08-14
Attending: RADIOLOGY
Payer: MEDICARE

## 2020-08-14 VITALS
OXYGEN SATURATION: 97 % | RESPIRATION RATE: 16 BRPM | SYSTOLIC BLOOD PRESSURE: 138 MMHG | DIASTOLIC BLOOD PRESSURE: 73 MMHG | HEART RATE: 70 BPM | BODY MASS INDEX: 34.17 KG/M2 | TEMPERATURE: 97.4 F | WEIGHT: 231.4 LBS

## 2020-08-14 PROCEDURE — 99212 OFFICE O/P EST SF 10 MIN: CPT | Performed by: NURSE PRACTITIONER

## 2020-08-14 PROCEDURE — 99213 OFFICE O/P EST LOW 20 MIN: CPT | Performed by: NURSE PRACTITIONER

## 2020-08-14 ASSESSMENT — PAIN SCALES - GENERAL: PAINLEVEL_OUTOF10: 10

## 2020-08-14 ASSESSMENT — PAIN DESCRIPTION - PAIN TYPE: TYPE: CHRONIC PAIN

## 2020-08-14 ASSESSMENT — PAIN DESCRIPTION - LOCATION: LOCATION: HIP

## 2020-08-14 ASSESSMENT — PAIN DESCRIPTION - ORIENTATION: ORIENTATION: RIGHT

## 2020-08-14 NOTE — PROGRESS NOTES
Radiation Oncology   Follow Up Note        8/14/2020    Heavenly Hall  Vitals:    08/14/20 1029   BP: 138/73   Pulse: 70   Resp: 16   Temp: 97.4 °F (36.3 °C)   SpO2: 97%     Wt Readings from Last 3 Encounters:   08/14/20 231 lb 6.4 oz (105 kg)   07/28/20 234 lb (106.1 kg)   07/08/20 234 lb (106.1 kg)         Sofia Gutierrez MD,      CC: Patient is here for follow up for post-radiation completion. HPI:  Heavenly Hall is a pleasant 61 y.o. female with a diagnosis of pT1b, pN0 cMo left breast CA (ER+, [place]+, Her2-), s/p BCS and adjuvant XRT. The patient underwent a course of radiation therapy to the left breast, which was completed on 12/31/19. She completed 4256 cGy in 16 fractions. States that she is doing well. States that she does not complete monthly self breast exams because her breast always \"feel lumpy to me. \" However, she denies noticing any new lumps or bumps or discharge from the nipple. Mammogram completed 7/8/20, see results below. States that she has had a problem with having cockroaches in her mobile home this summer and more recently bed bugs. Notes scabbing to hands and shins. Declines referral to . Pt is following with Dr Inna Rojas for medical oncology. Started on Arimidex and tolerating well. Notes occasional hotflashes but these have gotten better. Next appt 10/27/20. Pt is following with Dr Lazarus Larve for breast surgery. Next appt 12/1/20.     Past Medical History:   Diagnosis Date    Anxiety     Arthritis     Back pain     Cancer ([address] 2019    breast    Chronic obstructive pulmonary disease (COPD) ([facility] 8/18/2017    Depression     Diverticulosis     Hip pain, bilateral     Right worse than left    Hypertension     Moderate obesity 8/17/2017    Osteoarthritis of right hip 7/31/2012    Tobacco abuse          Past Surgical History:   Procedure Laterality Date    BREAST BIOPSY Left 10/18/2019    LEFT BREAST NEEDLE LOCALIZED LUMPECTOMY BLUE DYE INJECTION LEFT AXILL. SENTINEL LYMPH  NODE EXCISION performed by Shayne Aponte [facility] at [facility] 22 COLONOSCOPY  13    INDUCED   age 16    [place] COLONOSCOPY FLX DX W/COLLJ SPEC WHEN PFRMD N/A 2018    COLONOSCOPY performed by Chrystal Hui MD at [address] [place] [address]  2018    bx done antrum    UPPER GASTROINTESTINAL ENDOSCOPY N/A 2018    EGD BIOPSY performed by Chrystal Hui MD at [address] History     Socioeconomic History    Marital status: Single     Spouse name: Not on file    Number of children: Not on file    Years of education: Not on file    Highest education level: Not on file   Occupational History    Occupation: unemployed (house keeper)   Social Needs    Financial resource strain: Not on file    Food insecurity     Worry: Not on file     Inability: Not on file   [email or web address] needs     Medical: Not on file     Non-medical: Not on file   Tobacco Use    Smoking status: Current Every Day Smoker     Packs/day: 0.25     Years: 30.00     Pack years: 7.50     Types: Cigarettes    Smokeless tobacco: Never Used    Tobacco comment: smoked since age 21   Substance and Sexual Activity    Alcohol use: Yes     Alcohol/week: 0.8 standard drinks     Types: 1 Standard drinks or equivalent per week     Comment: 3-4 beers weekly. 2-4 cups coffee per day.  Pop daily    Drug use: Not Currently     Frequency: 4.0 times per week     Types: Marijuana     Comment: uses about 3 to 4 times week    Sexual activity: Not on file   Lifestyle    Physical activity     Days per week: Not on file     Minutes per session: Not on file    Stress: Not on file   Relationships    Social connections     Talks on phone: Not on file     Gets together: Not on file     Attends Anabaptism service: Not on file     Active member of club or organization: Not on file     Attends meetings of clubs or organizations: Not on file     Relationship status: Not on file   Ar Glass Intimate partner violence     Fear of current or ex partner: Not on file     Emotionally abused: Not on file     Physically abused: Not on file     Forced sexual activity: Not on file   Other Topics Concern    Not on file   Social History Narrative    Lives in [place], has a friend nearby. Family History   Problem Relation Age of Onset    Diabetes Mother     High Blood Pressure Mother     Cancer Mother 70        pancreatic    Arthritis Father     High Blood Pressure Father     Heart Disease Father         anurism 52    Cancer Maternal Grandmother 79        breast       Allergies:   Latex; Cephalosporins; Other; and Penicillins      Current Outpatient Medications   Medication Sig Dispense Refill    venlafaxine (EFFEXOR XR) 75 MG extended release capsule TK ONE C PO  QAM      anastrozole (ARIMIDEX) 1 MG tablet Take 1 tablet by mouth daily 90 tablet 1    vitamin C (ASCORBIC ACID) 500 MG tablet Take 500 mg by mouth daily      amLODIPine (NORVASC) 5 MG tablet TK 1 T PO QD      REXULTI 0.5 MG TABS tablet TK 1 T PO  QD      calcium carbonate-vitamin D (CALTRATE) 600-400 MG-UNIT TABS per tab Take 1 tablet by mouth 2 times daily      acetaminophen (TYLENOL) 500 MG tablet Take 500 mg by mouth every 6 hours as needed for Pain      Glucos-Chondroit-Hyaluron-MSM (GLUCOSAMINE CHONDROITIN JOINT PO) Take by mouth      HYDROXYZINE PAMOATE PO Take by mouth 2 times daily as needed       omeprazole (PRILOSEC) 20 MG delayed release capsule Take 1 capsule by mouth 2 times daily 30 capsule 3    amLODIPine-benazepril (LOTREL) 5-40 MG per capsule TK 1 C PO QD  0    BREO ELLIPTA 100-25 MCG/INH AEPB inhaler Inhale 2 puffs into the lungs daily       VENTOLIN  (90 Base) MCG/ACT inhaler Inhale 1 puff into the lungs every 4 hours as needed        No current facility-administered medications for this encounter. REVIEW OF SYSTEMS:       Constitutional:  No fever chills or rigors.     Eyes: No changes in vision, discharge, or pain   ENT: No Headaches, hearing loss or vertigo. No mouth sores or sore throat. No change in taste or smell.  Cardiovascular: No chest discomfort, dyspnea on exertion, palpitations or loss of consciousness or phlebitis.  Respiratory: Has no cough or wheezing, Has no sputum production. Has no hemoptysis, has no pleuritic pain.  Gastrointestinal: No abdominal pain, appetite loss, blood in stools. No change in bowel habits. No hematemesis    Genitourinary: Patient acknowledges no dysuria, trouble voiding, or hematuria. No nocturia or increased frequency.  Musculoskeletal: No gait disturbance, weakness or joint complaints.  Integumentary: No rash or pruritis.  Neurological: No headache, diplopia, change in muscle strength, numbness or tingling. No change in gait, balance, coordination, mood, affect, memory, mentation, behavior.  Psychiatric: No anxiety, or depression.  Endocrine: No temperature intolerance. No excessive thirst, fluid intake, or urination. No tremor.  Hematologic/Lymphatic: No abnormal bruising or bleeding, blood clots or swollen lymph nodes.  Allergic/Immunologic: No nasal congestion or hives. PHYSICAL EXAMINATION:   Vitals:    08/14/20 1029   BP: 138/73   Pulse: 70   Resp: 16   Temp: 97.4 °F (36.3 °C)   TempSrc: Temporal   SpO2: 97%   Weight: 231 lb 6.4 oz (105 kg)     Constitutional: A well developed, well nourished 61 y.o. female who is alert, oriented, cooperative and in no apparent distress. Head was normocephalic and atraumatic. EENT: EOMI PRIMO. MMM. No icterus. Neck: Trachea was midline. Respiratory: Chest expansion was symmetrical. Breath sounds bilaterally were clear to auscultation. No wheezes, rhonchi or rales. No intercostal retraction or use of accessory muscles. Cardiovascular: Regular without murmur, clicks, gallops or rubs. Abdomen: Obese, rounded and soft without organomegaly. No rebound, guarding or  rigidity. Lymphatic: No lymphadenopathy. Musculoskeletal: Ambulates without assistance. Normal curvature of the spine. No gross muscle weakness. Muscle size, tone and strength are normal. No involuntary movements. Extremities:  No lower extremity edema, ulcerations, tenderness, varicosities or erythema. Coordination appears adequate. Sensory function appears intact. Skin:  Warm and dry. Examination of color, turgor and pigmentation was relatively normal. No bruises. +scabbing to hands/wrists/shins (bed bugs per patient report). Old surgical scars are noted. Neurological/Psychiatric: General behavior, level of consciousness, thought content is normal. The patient's emotional status is normal.  Cranial nerves II-XII are grossly intact. Breasts: right breast normal without mass, skin or nipple changes or axillary nodes. Left breast surgical scars noted with hyperpigmentation/dryness and radiation fibrotic changes, otherwise without mass/nipple changes or axillary nodes. IMAGING:    MAMMOGRAM, 7/8/20:   Narrative    TISSUE DENSITY:    There are scattered fibroglandular densities (Type 2 density).         MAMMOGRAM FINDINGS:    Finding 1:    There is a post-radiation change and a post-surgical change seen in the left breast.         No suspicious masses, areas of suspicious architectural distortion, suspicious calcifications, or additional suspicious findings are identified.         IMPRESSION:    Post-radiation changes and Post-surgical changes in the left breast are benign.         Screening mammogram in 1 year is recommended.         =======================================    BI-RADS Category 2:  Benign    =======================================                   ASSESSMENT/PLAN:    pT1b, pN0 cMo left breast CA (ER+, [place]+, Her2-), s/p BCS and adjuvant radiation therapy to the left breast completed on 12/31/19 (4256 cGy in 16 fractions). Patient is doing well post-radiation completion.  Skin care reviewed. Encouraged breast massage and applying moisturizing cream to left breast to help with fibrotic changes. Encouraged monthly self breast exams. Mammograms per breast surgery/ med onc. Mammogram completed 7/8/20, post-radiation changes and post-surgical changes in the left breast are benign. No evidence of malignancy. States that she has had a problem with having cockroaches in her mobile home this summer and more recently bed bugs. Scabbing to hands and shins. Declines referral to . Cont to follow with Dr Anna Gonzalez for medical oncology. Started on Arimidex and tolerating well. Notes occasional hotflashes but these have gotten better. Next appt 10/27/20. Cont to follow with Dr Brenda Frost for breast surgery. Next appt 12/1/20. I discussed follow up plans with Den Lemos. At this time, Dr Stacy Gilman will see the patient back on a PRN basis as patient is following closely with medical oncology and breast surgery for cancer care. Instructed to follow up with other providers involved in their care as directed (including but not limited to Medical Oncology, Primary Care, Pulmonary, and Surgery). The patient was given our contact number in the event that if at any time they change their mind and would like to return to the clinic to see either myself or one of the Radiation Oncologists, they can simply call us and we would be happy to see them. Thank you for involving us in the management of this extremely pleasant patient. More than 25 min was in direct contact with pt coordinating/giving care. >50% of the visit was spent in counseling the pt on the following: Follow up care    The nurses notes were reviewed and incorporated into this assessment and plan.           Sincerely,    Artis Kaufman, MSN, RN, APRN-CNP  Nurse Practitioner for Radiation Oncology    PH[facility]IC[facility]ANS [facility]) [facility]: [phone] (LTA: [phone])  [address] [place] [address] [facility]:  [phone] (FAX:  [phone])  [facility]: [phone] (CUR: [phone])

## 2020-08-14 NOTE — ADDENDUM NOTE
Encounter addended by: BRAD Rosa CNP on: 8/14/2020 11:23 AM   Actions taken: Level of Service modified, Follow-up modified

## 2020-08-14 NOTE — PROGRESS NOTES
Danelle Hernandeznlshilo  8/14/2020  10:33 AM      Vitals:    08/14/20 1029   BP: 138/73   Pulse: 70   Resp: 16   Temp: 97.4 °F (36.3 °C)   SpO2: 97%    : Wt Readings from Last 3 Encounters:   08/14/20 231 lb 6.4 oz (105 kg)   07/28/20 234 lb (106.1 kg)   07/08/20 234 lb (106.1 kg)                Current Outpatient Medications:     venlafaxine (EFFEXOR XR) 75 MG extended release capsule, TK ONE C PO  QAM, Disp: , Rfl:     anastrozole (ARIMIDEX) 1 MG tablet, Take 1 tablet by mouth daily, Disp: 90 tablet, Rfl: 1    vitamin C (ASCORBIC ACID) 500 MG tablet, Take 500 mg by mouth daily, Disp: , Rfl:     amLODIPine (NORVASC) 5 MG tablet, TK 1 T PO QD, Disp: , Rfl:     REXULTI 0.5 MG TABS tablet, TK 1 T PO  QD, Disp: , Rfl:     calcium carbonate-vitamin D (CALTRATE) 600-400 MG-UNIT TABS per tab, Take 1 tablet by mouth 2 times daily, Disp: , Rfl:     acetaminophen (TYLENOL) 500 MG tablet, Take 500 mg by mouth every 6 hours as needed for Pain, Disp: , Rfl:     Glucos-Chondroit-Hyaluron-MSM (GLUCOSAMINE CHONDROITIN JOINT PO), Take by mouth, Disp: , Rfl:     HYDROXYZINE PAMOATE PO, Take by mouth 2 times daily as needed , Disp: , Rfl:     omeprazole (PRILOSEC) 20 MG delayed release capsule, Take 1 capsule by mouth 2 times daily, Disp: 30 capsule, Rfl: 3    amLODIPine-benazepril (LOTREL) 5-40 MG per capsule, TK 1 C PO QD, Disp: , Rfl: 0    BREO ELLIPTA 100-25 MCG/INH AEPB inhaler, Inhale 2 puffs into the lungs daily , Disp: , Rfl:     VENTOLIN  (90 Base) MCG/ACT inhaler, Inhale 1 puff into the lungs every 4 hours as needed , Disp: , Rfl:       Patient is seen today in follow up for follow up. Pt completed RT to L breast on 12/13/19. Pt is on arimidex and tolerating well. Pt is anxious and appears SOB. P.ox 97% on RA. Pt states that she is having issues at her home and has been staying with a friend. Pt also states that she recently had a death in her family. Pt is rocking back & forth in chair nervously.  Pt declines social work. Pt c/o pain to R hip 10/10 & states that she has surgery scheduled for a replacement in October. Pt states that her skin is discolored still but healed well. No pain or discomfort or any other issues noted that are related to radiation. NP updated. FALLS RISK SCREENING ASSESSMENT    Instructions:  Assess the patient and enter the appropriate indicators that are present for fall risk identification. Total the numbers entered and assign a fall risk score from Table 2.  Reassess patient at a minimum every 12 weeks or with status change. Assessment   Date  8/14/2020     1. Mental Ability: confusion/cognitively impaired No - 0       2. Elimination Issues: incontinence, frequency No - 0       3. Ambulatory: use of assistive devices (walker, cane, off-loading devices), attached to equipment (IV pole, oxygen) Yes - 2     4. Sensory Limitations: dizziness, vertigo, impaired vision No - 0       5. Age Less than 65 years - 0       6. Medication: diuretics, strong analgesics, hypnotics, sedatives, antihypertensive agents   Yes - 3   7. Falls:  recent history of falls within the last 3 months (not to include slipping or tripping)   No - 0   TOTAL 5    If score of 4 or greater was education given? Yes       TABLE 2   Risk Score Risk Level Plan of Care   0-3 Little or  No Risk 1. Provide assistance as indicated for ambulation activities  2. Reorient confused/cognitively impaired patient  3. Call-light/bell within patient's reach  4. Chair/bed in low position, stretcher/bed with siderails up except when performing patient care activities  5. Educate patient/family/caregiver on falls prevention  6.  Reassess in 12 weeks or with any noted change in patient condition which places them at a risk for a fall   4-6 Moderate Risk 1. Provide assistance as indicated for ambulation activities  2. Reorient confused/cognitively impaired patient  3. Call-light/bell within patient's reach  4.   Chair/bed in low position, stretcher/bed with siderails up except when performing patient care activities  5. Educate patient/family/caregiver on falls prevention  6. Falls risk precaution (Yellow sticker Level II) placed on patient chart   7 or   Higher High Risk 1. Place patient in easily observable treatment room  2. Patient attended at all times by family member or staff  3. Provide assistance as indicated for ambulation activities  4. Reorient confused/cognitively impaired patient  5. Call-light/bell within patient's reach  6. Chair/bed in low position, stretcher/bed with siderails up except when performing patient care activities  7. Educate patient/family/caregiver on falls prevention  8. Falls risk precaution (Yellow sticker Level III) placed on patient chart           MALNUTRITION RISK SCREENING ASSESSMENT    8/14/2020   Patient:  Eliecer Grover  Sex:  female    Instructions:  Assess the patient and enter the appropriate indicators that are present for nutrition risk identification. Total the numbers entered and assign a risk score. Follow the appropriate action for total score listed below. Assessment   Date  8/14/2020     1. Have you lost weight without trying? 0- No     2. Have you been eating poorly because of a decreased appetite? 0- No   3. Do you have a diagnosis of head and neck cancer?       0- No                                                                                    TOTAL 0          Score of 0-1: No action  Score 2 or greater:  · For Non-Diabetic Patient: Recommend adding Ensure Complete 2 x daily and provide patient with Ensure wellness bag with coupons  · For Diabetic Patient: Recommend adding Glucerna Shake 2 x daily and provide patient with Glucerna Wellness bag with coupons  · Route to the dietitian via [employer]

## 2020-09-28 ENCOUNTER — TELEPHONE (OUTPATIENT)
Dept: ADMINISTRATIVE | Age: 60
End: 2020-09-28

## 2020-11-10 ENCOUNTER — HOSPITAL ENCOUNTER (OUTPATIENT)
Dept: INFUSION THERAPY | Age: 60
Discharge: HOME | End: 2020-11-10

## 2020-11-10 DIAGNOSIS — C50.912 INVASIVE DUCTAL CARCINOMA OF LEFT BREAST (HCC): ICD-10-CM

## 2020-11-23 ENCOUNTER — TELEPHONE (OUTPATIENT)
Dept: INFUSION THERAPY | Age: 60
End: 2020-11-23

## 2020-11-23 NOTE — TELEPHONE ENCOUNTER
Returned patient's call with regards to 'I have a sore throat, runny nose, left lung burning pain, and I don't feel well. I don't want to see my family doctor in [address] due to I know they won't call me anything in. \" Voiced understanding and updated her to please call her family doctor and follow these instructions as she may be having symptoms of Covid. Also told patient that if she would test positive for Covid that she would need to have a negative repeat test prior to entering our building. She voiced understanding and is going to call her family doctor.

## 2020-11-24 ENCOUNTER — TELEPHONE (OUTPATIENT)
Dept: INFUSION THERAPY | Age: 60
End: 2020-11-24

## 2020-11-24 NOTE — TELEPHONE ENCOUNTER
Left a message for patient to return call to [phone] with regards to 'having Dr. Castillo Mcneal write an order for a Covid test, because I don't feel well and don't want to drive to [place] to see my family doctor. \" Per Dr. Castillo Mcneal, \"I am unable to write any orders without seeing this patient and this patient can not come into our building with the symptoms she is having. She is to call her family doctor and follow their instructions or go to a flu clinic. \" Voiced understanding. Awaiting a return call.

## 2021-01-20 ENCOUNTER — TELEPHONE (OUTPATIENT)
Dept: BREAST CENTER | Age: 61
End: 2021-01-20

## 2021-01-20 NOTE — TELEPHONE ENCOUNTER
Left message with call back number in reference to her upcoming appointment on 2/2/21. Patient had concerns with her skin and it being \"very itchy\" at a previous appointment. Would like to see if this has improved prior to her coming in for an office visit.

## 2021-02-02 DIAGNOSIS — Z79.811 AROMATASE INHIBITOR USE: Primary | ICD-10-CM

## 2021-03-04 RX ORDER — ANASTROZOLE 1 MG/1
1 TABLET ORAL DAILY
Qty: 90 TABLET | Refills: 1 | Status: SHIPPED
Start: 2021-03-04 | End: 2021-09-17 | Stop reason: SDUPTHER

## 2021-03-16 ENCOUNTER — OFFICE VISIT (OUTPATIENT)
Dept: ONCOLOGY | Age: 61
End: 2021-03-16
Payer: MEDICARE

## 2021-03-16 VITALS
RESPIRATION RATE: 18 BRPM | OXYGEN SATURATION: 96 % | WEIGHT: 231.5 LBS | DIASTOLIC BLOOD PRESSURE: 75 MMHG | HEART RATE: 90 BPM | HEIGHT: 69 IN | SYSTOLIC BLOOD PRESSURE: 142 MMHG | BODY MASS INDEX: 34.29 KG/M2 | TEMPERATURE: 97.7 F

## 2021-03-16 DIAGNOSIS — Z85.3 PERSONAL HISTORY OF BREAST CANCER: Primary | ICD-10-CM

## 2021-03-16 PROCEDURE — 3017F COLORECTAL CA SCREEN DOC REV: CPT | Performed by: INTERNAL MEDICINE

## 2021-03-16 PROCEDURE — G8427 DOCREV CUR MEDS BY ELIG CLIN: HCPCS | Performed by: INTERNAL MEDICINE

## 2021-03-16 PROCEDURE — G8417 CALC BMI ABV UP PARAM F/U: HCPCS | Performed by: INTERNAL MEDICINE

## 2021-03-16 PROCEDURE — G8484 FLU IMMUNIZE NO ADMIN: HCPCS | Performed by: INTERNAL MEDICINE

## 2021-03-16 PROCEDURE — 99214 OFFICE O/P EST MOD 30 MIN: CPT | Performed by: INTERNAL MEDICINE

## 2021-03-16 PROCEDURE — 99213 OFFICE O/P EST LOW 20 MIN: CPT

## 2021-03-16 PROCEDURE — 4004F PT TOBACCO SCREEN RCVD TLK: CPT | Performed by: INTERNAL MEDICINE

## 2021-03-16 NOTE — PROGRESS NOTES
carcinoma, ductal carcinoma in situ and benign breast ducts. Changes compatible with prior biopsy site. B. [place] lymph node #1, biopsy: 8 of 8 lymph nodes negative for involvement by carcinoma, sinus histiocytosis is present. CANCER CASE SUMMARY:  Procedure: Excision with needle localization  Specimen laterality: Left  Tumor site: 2:00 position (per history and physical examination per  patient's electronic medical record)  Tumor size: 7 mm in greatest dimension       Additional dimensions: 7 x 5 mm  Histologic type:  Invasive carcinoma of no special type (invasive ductal  carcinoma, not otherwise specified)  Histologic grade (Sam histologic score):   Glandular/tubular differentiation: Score 2   Nuclear pleomorphism: Score 2   Mitotic rate: Score 1   Overall grade: Grade 1 (score of 5)  Tumor focality: Single focus of invasive carcinoma  Ductal carcinoma in situ: Present, negative for extensive intraductal  component       Size/extent of DCIS: Less than 1 mm in any given tissue section,       DCIS is focally present in 6 of 10 tissue blocks       Architectural patterns: Solid and cribriform       Nuclear grade: Grade 1       Necrosis: Not identified  Lobular carcinoma in situ: Not identified  Invasive carcinoma margins: Uninvolved by invasive carcinoma   Distance from closest margin: 5 mm from closest superior margin  DCIS margins: Uninvolved by DCIS   Distance from closest margin: 2 mm from the closest medial margin  Regional lymph nodes: Uninvolved by tumor cells   Total number of lymph nodes examined: 8       Number of sentinel lymph nodes examined: 8  Treatment effect: No known presurgical therapy  Lymphovascular invasion: Not identified  Pathologic stage classification (pTNM, AJCC 8th Edition):   pT1b, pN0    Oncotype DX Breast Cancer Report-Node Negative  Recurrence Score Result-18  Distant recurrence risk at 9 years- 5%  Absolute chemotherapy benefit- <1%    NCCN guidelines reviewed with patient. No indication for adjuvant chemotherapy; Recommended adjuvant RT followed by hormonal therapy (Arimidex 1 mg p.o. daily for 5 years). RT was started on 12/09/2019 and completed on 12/31/2019  DEXA scan 11/22/2019 normal in LS and hips. Arimidex 1 mg po daily was started on 01/14/2020    Today 03/16/2021. Mild hot flashes not affecting quality of life. Good appetite and energy level. No chest pain or SOB. Review of Systems;  CONSTITUTIONAL: No fever. Mild hot flashes not affecting quality of life. Good appetite and energy level. ENMT: Eyes: No diplopia; Nose: No epistaxis. Mouth: No sore throat. RESPIRATORY: No hemoptysis, shortness of breath, cough. CARDIOVASCULAR: No chest pain, palpitations. GASTROINTESTINAL: No nausea/vomiting, abdominal pain, diarrhea/constipation. GENITOURINARY: No dysuria, urinary frequency, hematuria. NEURO: No syncope, presyncope, headache. Remainder:  ROS NEGATIVE    Past Medical History:      Diagnosis Date    Anxiety     Arthritis     Back pain     Cancer ([facility] [address] 2019    breast    Chronic obstructive pulmonary disease (COPD) ([facility] [address] 8/18/2017    Depression     Diverticulosis     Hip pain, bilateral     Right worse than left    Hypertension     Moderate obesity 8/17/2017    Osteoarthritis of right hip 7/31/2012    Tobacco abuse      Medications:  Reviewed and reconciled. Allergies: Allergies   Allergen Reactions    Latex Rash    Cephalosporins Hives    Other      Black sutures  hand swelled up    Penicillins      Unknown       Physical Exam:  BP (!) 142/75   Pulse 90   Temp 97.7 °F (36.5 °C)   Resp 18   Ht 5' 9\" (1.753 m)   Wt 231 lb 8 oz (105 kg)   LMP 01/20/2014   SpO2 96%   BMI 34.19 kg/m²   GENERAL: Alert, oriented x 3, not in acute distress. HEENT: PERRLA; EOMI. Oropharynx clear. NECK: Supple. Without lymphadenopathy. LUNGS: Good air entry bilaterally. No wheezing, crackles or ronchi. CARDIOVASCULAR: Regular rate.  No murmurs, rubs or gallops. ABDOMEN: Soft. Non-tender, non-distended. Positive bowel sounds. EXTREMITIES: Without clubbing, cyanosis, or edema. NEUROLOGIC: No focal deficits. ECOG 1    Impression/Plan:  61 y.o. female who underwent Needle localized left lumpectomy with sentinel lymph node excision on 10/18/2019. A. Left breast, needle localization excision/lumpectomy: Invasive ductal carcinoma, nuclear grade 2, see cancer case summary. Ductal carcinoma in situ, low grade, cribriform and solid types. Margins of excision are negative for involvement by invasive or in situ carcinoma. Microcalcifications are present in association with invasive carcinoma, ductal carcinoma in situ and benign breast ducts. Changes compatible with prior biopsy site. B. [place] lymph node #1, biopsy: 8 of 8 lymph nodes negative for involvement by carcinoma, sinus histiocytosis is present. CANCER CASE SUMMARY:  Procedure: Excision with needle localization  Specimen laterality: Left  Tumor site: 2:00 position (per history and physical examination per  patient's electronic medical record)  Tumor size: 7 mm in greatest dimension       Additional dimensions: 7 x 5 mm  Histologic type:  Invasive carcinoma of no special type (invasive ductal  carcinoma, not otherwise specified)  Histologic grade ([place] histologic score):   Glandular/tubular differentiation: Score 2   Nuclear pleomorphism: Score 2   Mitotic rate: Score 1   Overall grade: Grade 1 (score of 5)  Tumor focality: Single focus of invasive carcinoma  Ductal carcinoma in situ: Present, negative for extensive intraductal  component       Size/extent of DCIS: Less than 1 mm in any given tissue section,       DCIS is focally present in 6 of 10 tissue blocks       Architectural patterns: Solid and cribriform       Nuclear grade: Grade 1       Necrosis: Not identified  Lobular carcinoma in situ: Not identified  Invasive carcinoma margins: Uninvolved by invasive carcinoma  Distance from closest margin: 5 mm from closest superior margin  DCIS margins: Uninvolved by DCIS   Distance from closest margin: 2 mm from the closest medial margin  Regional lymph nodes: Uninvolved by tumor cells   Total number of lymph nodes examined: 8       Number of sentinel lymph nodes examined: 8  Treatment effect: No known presurgical therapy  Lymphovascular invasion: Not identified  Pathologic stage classification (pTNM, AJCC 8th Edition):   pT1b, pN0    Breast Cancer Marker Studies:  ER:Positive:100%  KS:Positive:70%  Her-2/leslie (c-erb B-2) protein expression:  Negative (Score 0)     Oncotype DX Recurrence Score Result-18  NCCN guidelines reviewed with patient. No indication for adjuvant chemotherapy; Recommended adjuvant RT followed by hormonal therapy (Arimidex 1 mg p.o. daily for 5 years). RT was started on 12/09/2019 and completed on 12/31/2019  DEXA scan 11/22/2019 normal in LS and hips. Arimidex 1 mg po daily was started on 01/14/2020 with fair tolerance so far. Mild hot flashes not affecting quality of life. Bilateral Screening Mammogram 07/08/2020 Negative for malignancy  Imaging reviewed    DOUG. Continue Arimidex, Ca/ViTD.     RTC July 2021 with prior mammogram. DEXA scheduled 03/25/2021    Cynthia Mayfield MD    3/16/2021

## 2021-03-18 ASSESSMENT — ENCOUNTER SYMPTOMS
EYE ITCHING: 0
CONSTIPATION: 0
BACK PAIN: 0
VOMITING: 0
WHEEZING: 0
DIARRHEA: 0
CHOKING: 0
NAUSEA: 0
SINUS PAIN: 0
CHEST TIGHTNESS: 0
ABDOMINAL PAIN: 0
VOICE CHANGE: 0
SORE THROAT: 0
COUGH: 0
SINUS PRESSURE: 0
ABDOMINAL DISTENTION: 0
RHINORRHEA: 0
SHORTNESS OF BREATH: 0
EYE DISCHARGE: 0
BLOOD IN STOOL: 0
TROUBLE SWALLOWING: 0

## 2021-03-18 NOTE — PROGRESS NOTES
Subjective:  Left breast Invasive ductal carcinoma admixed with ductal carcinoma in situ (DCIS)  -Oncotype DX 18  -RT was started on 12/09/2019 and completed on 12/31/2019  -DEXA scan 11/22/2019 normal in LS and hips. -ET:  Arimidex 1 mg po daily was started on 01/14/2020       Patient ID: Jo Ann Linton is a 61 y.o. female. She presents today for a 6 mos clinical follow up    HPI  61 y.o. who was noted to have a mass on mammogram.  The mass was located in the 2 o'clock position of left breast.  Breast cancer risk factors include age, gender, menopause after 48, family history of breast and pancreatic cancer. Ashkenazi [employer] Ancestry: No     Bilateral Screening Mammogram 05/21/2019: There is an irregular mass measuring 7 mm seen in the posterior upper outer quadrant of the left breast.   Left Breast US 05/31/2019: there is an irregular solid mass with angular margins measuring 8 x 6 x 7 mm in the left breast at 2 o'clock located 8 centimeters from the nipple. Internal echogenicity is hypoechoic. There is no axillary lymphadenopathy.     Left Breast Mass Biopsy 05/31/2019:   Breast, left mass at 2:00, biopsy:  Invasive ductal carcinoma admixed with ductal carcinoma in situ (DCIS)  with microcalcifications, see comment. Comment: The invasive ductal carcinoma is Sam grade 1 (score of 4): glandular differentiation score 1, nuclear pleomorphism score 2, mitotic activity score 1. The DCIS is cribriform type with microcalcifications, lacking necrosis. Immunostain for p63 shows negative staining in the invasive carcinoma. Breast Cancer Marker Studies:  ER:Positive:100%  GA:Positive:70%  Her-2/leslie (c-erb B-2) protein expression:  Negative (Score 0)      Needle localized left lumpectomy with sentinel lymph node excision on 10/18/2019. A. Left breast, needle localization excision/lumpectomy: Invasive ductal carcinoma, nuclear grade 2, see cancer case summary.   Ductal carcinoma in situ, low grade, cribriform and solid types. Margins of excision are negative for involvement by invasive or in situ carcinoma. Microcalcifications are present in association with invasive carcinoma, ductal carcinoma in situ and benign breast ducts. Changes compatible with prior biopsy site. B. [place] lymph node #1, biopsy: 8 of 8 lymph nodes negative for involvement by carcinoma, sinus histiocytosis is present.     CANCER CASE SUMMARY:  Procedure: Excision with needle localization  Specimen laterality: Left  Tumor site: 2:00 position (per history and physical examination per  patient's electronic medical record)  Tumor size: 7 mm in greatest dimension       Additional dimensions: 7 x 5 mm  Histologic type:  Invasive carcinoma of no special type (invasive ductal  carcinoma, not otherwise specified)  Histologic grade ([place] histologic score):   Glandular/tubular differentiation: Score 2   Nuclear pleomorphism: Score 2   Mitotic rate: Score 1   Overall grade: Grade 1 (score of 5)  Tumor focality: Single focus of invasive carcinoma  Ductal carcinoma in situ: Present, negative for extensive intraductal  component       Size/extent of DCIS: Less than 1 mm in any given tissue section,       DCIS is focally present in 6 of 10 tissue blocks       Architectural patterns: Solid and cribriform       Nuclear grade: Grade 1       Necrosis: Not identified  Lobular carcinoma in situ: Not identified  Invasive carcinoma margins: Uninvolved by invasive carcinoma   Distance from closest margin: 5 mm from closest superior margin  DCIS margins: Uninvolved by DCIS   Distance from closest margin: 2 mm from the closest medial margin  Regional lymph nodes: Uninvolved by tumor cells   Total number of lymph nodes examined: 8       Number of sentinel lymph nodes examined: 8  Treatment effect: No known presurgical therapy  Lymphovascular invasion: Not identified  Pathologic stage classification (pTNM, AJCC 8th Edition):   pT1b, pN0     Oncotype DX Breast Cancer Report-Node Negative  Recurrence Score Result-18  Distant recurrence risk at 9 years- 5%  Absolute chemotherapy benefit- <1%       No indication for adjuvant chemotherapy; Berta Briceño MD   RT was started on 2019 and completed on 2019  DEXA scan 2019 normal in LS and hips. Arimidex 1 mg po daily was started on 2020       Past Medical History:   Diagnosis Date    Anxiety     Arthritis     Back pain     Cancer ([facility] [address] 2019    breast    Chronic obstructive pulmonary disease (COPD) ([facility] [address] 2017    Depression     Diverticulosis     Hip pain, bilateral     Right worse than left    Hypertension     Moderate obesity 2017    Osteoarthritis of right hip 2012    Tobacco abuse        Past Surgical History:   Procedure Laterality Date    BREAST BIOPSY Left 10/18/2019    LEFT BREAST NEEDLE LOCALIZED LUMPECTOMY BLUE DYE INJECTION LEFT AXILL.   SENTINEL LYMPH  NODE EXCISION performed by Gene Dill MD at [facility] COLONOSCOPY  13    INDUCED   age 16    ME COLONOSCOPY FLX DX W/COLLJ SPEC WHEN PFRMD N/A 2018    COLONOSCOPY performed by Leonardo Sibley MD at [address] [place] [address],[address]  2018    bx done antrum    UPPER GASTROINTESTINAL ENDOSCOPY N/A 2018    EGD BIOPSY performed by Leonardo Sibley MD at [place] [address]       Current Outpatient Medications   Medication Sig Dispense Refill    anastrozole (ARIMIDEX) 1 MG tablet Take 1 tablet by mouth daily 90 tablet 1    venlafaxine (EFFEXOR XR) 75 MG extended release capsule TK ONE C PO  QAM      vitamin C (ASCORBIC ACID) 500 MG tablet Take 500 mg by mouth daily      amLODIPine (NORVASC) 5 MG tablet TK 1 T PO QD      REXULTI 0.5 MG TABS tablet TK 1 T PO  QD      calcium carbonate-vitamin D (CALTRATE) 600-400 MG-UNIT TABS per tab Take 1 tablet by mouth 2 times daily      acetaminophen (TYLENOL) 500 MG tablet Take 500 mg by mouth every 6 hours as needed for Pain      Glucos-Chondroit-Hyaluron-MSM (GLUCOSAMINE CHONDROITIN JOINT PO) Take by mouth      HYDROXYZINE PAMOATE PO Take by mouth 2 times daily as needed       omeprazole (PRILOSEC) 20 MG delayed release capsule Take 1 capsule by mouth 2 times daily 30 capsule 3    amLODIPine-benazepril (LOTREL) 5-40 MG per capsule TK 1 C PO QD  0    BREO ELLIPTA 100-25 MCG/INH AEPB inhaler Inhale 2 puffs into the lungs daily       VENTOLIN  (90 Base) MCG/ACT inhaler Inhale 1 puff into the lungs every 4 hours as needed        No current facility-administered medications for this visit. Allergies   Allergen Reactions    Latex Rash    Cephalosporins Hives    Other      Black sutures  hand swelled up    Penicillins      Unknown         Family History   Problem Relation Age of Onset    Diabetes Mother     High Blood Pressure Mother     Cancer Mother 70        pancreatic    Arthritis Father     High Blood Pressure Father     Heart Disease Father         anurism 52    Cancer Maternal Grandmother 79        breast       Social History     Socioeconomic History    Marital status: Single     Spouse name: Not on file    Number of children: Not on file    Years of education: Not on file    Highest education level: Not on file   Occupational History    Occupation: unemployed (house keeper)   Social Needs    Financial resource strain: Not on file    Food insecurity     Worry: Not on file     Inability: Not on file   [employer] needs     Medical: Not on file     Non-medical: Not on file   Tobacco Use    Smoking status: Current Every Day Smoker     Packs/day: 0.25     Years: 30.00     Pack years: 7.50     Types: Cigarettes    Smokeless tobacco: Never Used    Tobacco comment: smoked since age 21   Substance and Sexual Activity    Alcohol use: Yes     Alcohol/week: 0.8 standard drinks     Types: 1 Standard drinks or equivalent per week     Comment: 3-4 beers weekly. 2-4 cups coffee per day.  Pop daily    Drug myalgias, neck pain and neck stiffness. She has persistent bilateral hip pain right worse than left. Is a candidate for hip replacement however, she has to clear up her skin lesions prior to surgery. She also reports that she is in need of carpal tunnel surgery bilaterally. Allergic/Immunologic: Negative for environmental allergies and food allergies. Neurological: Negative for dizziness, seizures, syncope, speech difficulty, weakness, light-headedness and headaches. Hematological: Negative for adenopathy. Does not bruise/bleed easily. Psychiatric/Behavioral: Negative for agitation, confusion and decreased concentration. The patient is not nervous/anxious. Objective:   Physical Exam  Vitals signs and nursing note reviewed. Constitutional:       General: She is not in acute distress. Appearance: She is well-developed. She is not diaphoretic. Comments: ECOG main stable at 1 d/t hip pain; pleasant and conversant. 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 HENT:      Head: Normocephalic and atraumatic. Mouth/Throat:      Pharynx: No oropharyngeal exudate. Eyes:      General: No scleral icterus. Right eye: No discharge. Left eye: No discharge. Conjunctiva/sclera: Conjunctivae normal.   Neck:      Musculoskeletal: Normal range of motion and neck supple. Thyroid: No thyromegaly. Vascular: No JVD. Trachea: No tracheal deviation. Cardiovascular:      Rate and Rhythm: Normal rate and regular rhythm. Heart sounds: No murmur. No friction rub. No gallop. Pulmonary:      Effort: Pulmonary effort is normal. No respiratory distress or retractions. Breath sounds: No stridor. Examination of the right-lower field reveals decreased breath sounds. Examination of the left-lower field reveals decreased breath sounds. Decreased breath sounds present. No wheezing or rales.       Comments: Breath sounds diminished in bilateral bases.  No wheeze rhonchi or crackles appreciated. Her expiratory phase is moderately prolonged. No increased work of breathing appreciated. O2 saturations stable at 97% on room air. Chest:      Chest wall: No mass, lacerations, deformity, swelling, tenderness or edema. Breasts: Breasts are symmetrical.         Right: No inverted nipple, mass, nipple discharge, skin change or tenderness. Left: No inverted nipple, mass, nipple discharge, skin change or tenderness. Comments: Right supple. No skin dimpling or puckering. No nipple discharge. No clinically suspicious lumps nodules or masses appreciated. No axillary lymphadenopathy. Abdominal:      General: There is no distension. Palpations: Abdomen is soft. Tenderness: There is no abdominal tenderness. There is no guarding or rebound. Musculoskeletal: Normal range of motion. General: No tenderness or deformity. Right shoulder: Normal.      Left shoulder: Normal.   Lymphadenopathy:      Cervical: No cervical adenopathy. Right cervical: No superficial, deep or posterior cervical adenopathy. Left cervical: No superficial, deep or posterior cervical adenopathy. Upper Body:      Right upper body: No pectoral adenopathy. Left upper body: No pectoral adenopathy. Skin:     General: Skin is warm and dry. Coloration: Skin is not pale. Findings: No erythema or rash. Comments: She has diffuse scabs and sores of her upper torso and her upper extremities less so on her lower extremities likely related to insect bites; history of bedbugs. She reports that she no longer has the bedbug problem but has residual skin sores she is working to clear up. No insects appreciated on exam.   Neurological:      Mental Status: She is alert and oriented to person, place, and time. Coordination: Coordination normal.   Psychiatric:         Behavior: Behavior normal.         Thought Content:  Thought content normal.         Judgment: Judgment normal.        Assessment:      61 y.o. female who underwent a left mass, 2 o'clock position, 8 cm's from the nipple breast biopsy:    05/31/2019 left breast biopsy invasive ductal carcinoma admixed with ductal carcinoma in situ with microcalcifications.    -Serum markers: ER positive at 100%; SD positive at 70%; HER-2/leslie negative disease.    -10/18/2019 Post Needle localized left lumpectomy with sentinel lymph node excision on 10/18/2019. Tumor size 7 mm. Invasive carcinoma of no special type (invasive ductal carcinoma, not otherwise specified). Overall grade 1. DCIS present, negative for extensive intraductal component. DCIS less than 1 mm in any given tissue section. Nuclear grade 1. Necrosis not identified. LCIS not identified. Margins are negative for invasive or DCIS. 8 of 8 lymph nodes negative for involvement by carcinoma. Pathologic stage pT1b, pN0.    -Oncotype DX Recurrence Score Result-18  -Completed radiation therapy 12/31/2019.  -11/22/2019 DEXA bone density normal and lumbar spine and bilateral hips. -01/14/2020 started endocrine therapy with Arimidex 1 mg p.o. daily. -07/08/2020 bilateral screening mammogram: Benign.  -03/24/2021 DEXA bone density  -03/24/2021 clinical follow-up with an unremarkable exam and no evidence of recurrent disease. Tolerating Arimidex well. For her chronic right hip pain she was seen by orthopedics in July 2020 and opted for conservative management. B/L wrist pain, but reports she was informed she needs B/L carpel tunnel surgery. She continues working with primary care for her medical comorbidities. Plan:   1. Continue monthly breast/chest wall self examination; detailed instructions reviewed today. Bring any changes to your physician's attention. 2. Continue healthy diet and exercise routinely as tolerated. 3. Maintaining ideal body weight (20-25 BMI) may lead to optimal breast cancer outcomes.   4. Avoid

## 2021-03-24 ENCOUNTER — HOSPITAL ENCOUNTER (OUTPATIENT)
Dept: GENERAL RADIOLOGY | Age: 61
Discharge: HOME | End: 2021-03-26
Payer: MEDICARE

## 2021-03-24 ENCOUNTER — OFFICE VISIT (OUTPATIENT)
Dept: BREAST CENTER | Age: 61
End: 2021-03-24
Payer: MEDICARE

## 2021-03-24 VITALS
OXYGEN SATURATION: 97 % | DIASTOLIC BLOOD PRESSURE: 88 MMHG | BODY MASS INDEX: 34.21 KG/M2 | WEIGHT: 231 LBS | HEART RATE: 99 BPM | HEIGHT: 69 IN | SYSTOLIC BLOOD PRESSURE: 122 MMHG | RESPIRATION RATE: 18 BRPM | TEMPERATURE: 97 F

## 2021-03-24 DIAGNOSIS — Z79.811 AROMATASE INHIBITOR USE: ICD-10-CM

## 2021-03-24 DIAGNOSIS — C50.912 INVASIVE DUCTAL CARCINOMA OF LEFT BREAST (HCC): ICD-10-CM

## 2021-03-24 PROCEDURE — 4004F PT TOBACCO SCREEN RCVD TLK: CPT | Performed by: NURSE PRACTITIONER

## 2021-03-24 PROCEDURE — G8417 CALC BMI ABV UP PARAM F/U: HCPCS | Performed by: NURSE PRACTITIONER

## 2021-03-24 PROCEDURE — 77080 DXA BONE DENSITY AXIAL: CPT

## 2021-03-24 PROCEDURE — G8427 DOCREV CUR MEDS BY ELIG CLIN: HCPCS | Performed by: NURSE PRACTITIONER

## 2021-03-24 PROCEDURE — 99212 OFFICE O/P EST SF 10 MIN: CPT | Performed by: NURSE PRACTITIONER

## 2021-03-24 PROCEDURE — G8484 FLU IMMUNIZE NO ADMIN: HCPCS | Performed by: NURSE PRACTITIONER

## 2021-03-24 PROCEDURE — 99213 OFFICE O/P EST LOW 20 MIN: CPT | Performed by: NURSE PRACTITIONER

## 2021-03-24 PROCEDURE — 3017F COLORECTAL CA SCREEN DOC REV: CPT | Performed by: NURSE PRACTITIONER

## 2021-03-24 RX ORDER — ATORVASTATIN CALCIUM 10 MG/1
10 TABLET, FILM COATED ORAL DAILY
COMMUNITY
End: 2021-07-30 | Stop reason: SDUPTHER

## 2021-03-24 NOTE — PATIENT INSTRUCTIONS

## 2021-04-23 ENCOUNTER — INITIAL CONSULT (OUTPATIENT)
Dept: SURGERY | Age: 61
End: 2021-04-23
Payer: MEDICARE

## 2021-04-23 ENCOUNTER — TELEPHONE (OUTPATIENT)
Dept: SURGERY | Age: 61
End: 2021-04-23

## 2021-04-23 VITALS — SYSTOLIC BLOOD PRESSURE: 126 MMHG | DIASTOLIC BLOOD PRESSURE: 88 MMHG | TEMPERATURE: 97.6 F

## 2021-04-23 DIAGNOSIS — K21.00 GASTROESOPHAGEAL REFLUX DISEASE WITH ESOPHAGITIS WITHOUT HEMORRHAGE: ICD-10-CM

## 2021-04-23 PROCEDURE — 99214 OFFICE O/P EST MOD 30 MIN: CPT | Performed by: SURGERY

## 2021-04-23 PROCEDURE — G8427 DOCREV CUR MEDS BY ELIG CLIN: HCPCS | Performed by: SURGERY

## 2021-04-23 PROCEDURE — G8417 CALC BMI ABV UP PARAM F/U: HCPCS | Performed by: SURGERY

## 2021-04-23 RX ORDER — SODIUM CHLORIDE, SODIUM LACTATE, POTASSIUM CHLORIDE, CALCIUM CHLORIDE 600; 310; 30; 20 MG/100ML; MG/100ML; MG/100ML; MG/100ML
INJECTION, SOLUTION INTRAVENOUS CONTINUOUS
Status: CANCELLED | OUTPATIENT
Start: 2021-04-23

## 2021-04-23 RX ORDER — HYDROCORTISONE ACETATE 25 MG/1
25 SUPPOSITORY RECTAL 2 TIMES DAILY
Qty: 10 SUPPOSITORY | Refills: 0 | Status: SHIPPED | OUTPATIENT
Start: 2021-04-23 | End: 2021-04-28

## 2021-04-23 NOTE — PROGRESS NOTES
Sanjay Garcia  2021      Ronny Zamora Office Consult    CHIEF COMPLAINT:  Rectal bleeding, acid reflux    HISTORY OF PRESENT ILLNESS:  Sanjay Garcia is a 61 y.o.  female with irritable bowel diarrhea and constipation which causes rectal bleeding, getting worse recently, not on any fiber yet. Colonoscopy 2018 was normal. She also has a long history of acid reflux on Omeprazole. She was told she needed to stop the medication but can't due to the pain, EGD 2018 showed a 2 cm hiatal hernia and esophagitis. Labs drawn, results pending. Past Medical History: She has a past medical history of Anxiety, Arthritis, Back pain, Cancer ([facility] [address] Chronic obstructive pulmonary disease (COPD) ([facility] [address] Depression, Diverticulosis, Hip pain, bilateral, Hypertension, Moderate obesity, Osteoarthritis of right hip, and Tobacco abuse. Past Surgical History: She has a past surgical history that includes Colonoscopy (13); Upper gastrointestinal endoscopy (2018); Upper gastrointestinal endoscopy (N/A, 2018); pr colonoscopy flx dx w/collj spec when pfrmd (N/A, 2018); Induced  (age 16); and Breast biopsy (Left, 10/18/2019). Home Medications  Prior to Visit Medications    Medication Sig Taking?  Authorizing Provider   atorvastatin (LIPITOR) 10 MG tablet Take 10 mg by mouth daily Yes Historical Provider, MD   anastrozole (ARIMIDEX) 1 MG tablet Take 1 tablet by mouth daily Yes Binh Alva APRN - CNP   venlafaxine (EFFEXOR XR) 75 MG extended release capsule TK ONE C PO  QAM Yes Historical Provider, MD   vitamin C (ASCORBIC ACID) 500 MG tablet Take 500 mg by mouth daily Yes Historical Provider, MD   amLODIPine (NORVASC) 5 MG tablet 10 mg  Yes Historical Provider, MD   calcium carbonate-vitamin D (CALTRATE) 600-400 MG-UNIT TABS per tab Take 1 tablet by mouth 2 times daily Yes Historical Provider, MD   acetaminophen (TYLENOL) 500 MG tablet Take 500 mg by mouth every 6 hours as needed for Pain Yes Historical Provider, MD   Glucos-Chondroit-Hyaluron-MSM (GLUCOSAMINE CHONDROITIN JOINT PO) Take by mouth Yes Historical Provider, MD   HYDROXYZINE PAMOATE PO Take by mouth 2 times daily as needed  Yes Historical Provider, MD   omeprazole (PRILOSEC) 20 MG delayed release capsule Take 1 capsule by mouth 2 times daily Yes Tony Lyles MD   BREO ELLIPTA 100-25 MCG/INH AEPB inhaler Inhale 2 puffs into the lungs daily  Yes Historical Provider, MD   VENTOLIN  (90 Base) MCG/ACT inhaler Inhale 1 puff into the lungs every 4 hours as needed  Yes Historical Provider, MD   REXULTI 0.5 MG TABS tablet TK 1 T PO  QD  Historical Provider, MD   amLODIPine-benazepril (LOTREL) 5-40 MG per capsule TK 1 C PO QD  Historical Provider, MD       Allergies: Latex, Cephalosporins, Other, and Penicillins   Social History:   TOBACCO:   reports that she has been smoking cigarettes. She has a 7.50 pack-year smoking history. She has never used smokeless tobacco.  All smokers should join the free smoking cessation program and stop smoking before having surgery. ETOH:    reports current alcohol use of about 0.8 standard drinks of alcohol per week. Problem Relation Age of Onset    Diabetes Mother     High Blood Pressure Mother     Cancer Mother 70        pancreatic    Arthritis Father     High Blood Pressure Father     Heart Disease Father         anurism 52    Cancer Maternal Grandmother 79        breast       Review of Systems:  Psychiatric:  depression and anxiety  Respiratory: negative  Cardiovascular: negative  Gastrointestinal: negative  Musculoskeletal:negative  All others reviewed, negative    Physical Exam:   VITALS: Blood pressure 126/88, temperature 97.6 °F (36.4 °C), temperature source Temporal, last menstrual period 01/20/2014, not currently breastfeeding.   General appearance: alert, appears stated age and cooperative, does not ambulate easily  Head: Normocephalic, without obvious abnormality, atraumatic  Eyes: PERRL  Ears/mouth/throat:  Ears clear, mouth normal, throat no redness  Neck: no adenopathy, no JVD, supple, symmetrical, trachea midline and thyroid not enlarged  Lungs: clear to auscultation bilaterally  Heart: regular rate and rhythm  Abdomen: soft, non-tender; bowel sounds normal; no masses,  no organomegaly  Extremities: extremities normal, atraumatic, no cyanosis or edema  Skin: no open wounds    ASSESSMENT:   Rectal bleeding from irritable bowel  Acid reflux on long term Omeprazole, can't stop. PLAN:   Fiber gummy's 6 per day with 8 ounces of water each to control the irritable bowel constipation/diarrhea. Anusol suppositories for the rectal bleeding. EGD to check the esophagitis. Signed: Dr. Shanon Gallagher M.D.     Send copy of consult to PCP, Adrian Hinson MD

## 2021-04-23 NOTE — TELEPHONE ENCOUNTER
Per Dr. Wing Meredith, patient is scheduled for EGD at [address] [place] [address] on 2021. Surgery scheduling form faxed with confirmation, surgeon's calendar updated. Dr. Wing Meredith to enter orders. Follow up appointment scheduled. No bowel prep instructions provided & discussed. Will check if pre-cert is required. Prior Authorization Form:      DEMOGRAPHICS:                     Patient Name:  Reggie Zaldivar  Patient :  1960            Insurance:  Payor: Naima Lyon / Plan: Tan Briseno COMPLETE / Product Type: *No Product type* /   Insurance ID Number:    Payor/Plan Subscr  Sex Relation Sub. Ins. ID Effective Group Num   1.  1455 Harshil Mo 1960 Female Self [id number]                                   [address]         DIAGNOSIS & PROCEDURE:                       Procedure/Operation: EGD           CPT Code: K21.00    Diagnosis:  Gastroesophageal Reflux w/ Espohatitis without hemorrhage    ICD10 Code:   K21.00    Location:  [address] [place] [address]    Surgeon:  Veronica Lockwood INFORMATION:                          Date: 2021    Time: TBD              Anesthesia:                                                         Status:  Outpatient        Special Comments:         Electronically signed by Chico Oneal on 2021 at 12:31 PM

## 2021-05-20 ENCOUNTER — TELEPHONE (OUTPATIENT)
Dept: SURGERY | Age: 61
End: 2021-05-20

## 2021-06-09 DIAGNOSIS — C50.912 INVASIVE DUCTAL CARCINOMA OF LEFT BREAST (HCC): ICD-10-CM

## 2021-06-09 DIAGNOSIS — Z12.31 SCREENING MAMMOGRAM FOR HIGH-RISK PATIENT: Primary | ICD-10-CM

## 2021-06-24 DIAGNOSIS — Z12.31 ENCOUNTER FOR SCREENING MAMMOGRAM FOR MALIGNANT NEOPLASM OF BREAST: Primary | ICD-10-CM

## 2021-07-13 ENCOUNTER — TELEPHONE (OUTPATIENT)
Dept: SURGERY | Age: 61
End: 2021-07-13

## 2021-07-13 NOTE — TELEPHONE ENCOUNTER
Follow up call made to patient to inquire about scheduled EGD with Dr. Lucie Valdivia. Patient did find a ride and will have procedure done as scheduled. Rapid COVID testing to be done the day of procedure.   Electronically signed by Jayashree Hill RN on 7/13/2021 at 2:39 PM

## 2021-07-13 NOTE — PROGRESS NOTES
[address] [facility]                                                                                                                    PRE OP INSTRUCTIONS FOR  Lex Boyd        Date: 7/13/2021    Date of surgery: 7/14/21  Arrival Time: Hospital will call you between 5pm and 7pm with your final arrival time for surgery    1. Do not eat or drink anything after midnight prior to surgery. This includes no water, chewing gum, mints or ice chips. 2. Take the following medications with a small sip of water on the morning of Surgery: effexor, amlodipine, inhalers     3. Diabetics may take evening dose of insulin but none after midnight. If you feel symptomatic or low blood sugar morning of surgery drink 1-2 ounces of apple juice only. 4. Aspirin, Ibuprofen, Advil, Naproxen, Vitamin E and other Anti-inflammatory products should be stopped  before surgery  as directed by your physician. Take Tylenol only unless instructed otherwise by your surgeon. 5. Check with your Doctor regarding stopping Plavix, Coumadin, Lovenox, Eliquis, Effient, or other blood thinners. 6. Do not smoke,use illicit drugs and do not drink any alcoholic beverages 24 hours prior to surgery. 7. You may brush your teeth the morning of surgery. DO NOT SWALLOW WATER    8. You MUST make arrangements for a responsible adult to take you home after your surgery. You will not be allowed to leave alone or drive yourself home. It is strongly suggested someone stay with you the first 24 hrs. Your surgery will be cancelled if you do not have a ride home. 9. PEDIATRIC PATIENTS ONLY:  A parent/legal guardian must accompany a child scheduled for surgery and plan to stay at the hospital until the child is discharged. Please do not bring other children with you.     10. Please wear simple, loose fitting clothing to the hospital.  Lisabran Antoniocarolina not bring valuables (money, credit cards, checkbooks, etc.) Do not wear any makeup (including no eye makeup) or nail polish on your fingers or toes. 11. DO NOT wear any jewelry or piercings on day of surgery. All body piercing jewelry must be removed. 12. Shower the night before surgery with _x__Antibacterial soap /ANGELES WIPES________    13. TOTAL JOINT REPLACEMENT/HYSTERECTOMY PATIENTS ONLY---Remember to bring Blood Bank bracelet to the hospital on the day of surgery. 14. If you have a Living Will and Durable Power of  for Healthcare, please bring in a copy. 15. If appropriate bring crutches, inspirex, WALKER, CANE etc... 12. Notify your Surgeon if you develop any illness between now and surgery time, cough, cold, fever, sore throat, nausea, vomiting, etc.  Please notify your surgeon if you experience dizziness, shortness of breath or blurred vision between now & the time of your surgery. 17. If you have ___dentures, they will be removed before going to the OR; we will provide you a container. If you wear ___contact lenses or ___glasses, they will be removed; please bring a case for them. 18. To provide excellent care visitors will be limited to1 in the room at any given time. 19. Please bring picture ID and insurance card. 20. Sleep apnea patients need to bring CPAP AND SETTINGS to hospital on day of surgery. 21. During flu season no children under the age of 15 are permitted in the hospital for the safety of all patients. 22. Other please arrive to entrance B at 0800 AM and call with arrival.                 Please call AMBULATORY CARE if you have any further questions.    [facility]     [address]

## 2021-07-13 NOTE — PROGRESS NOTES
Pt states recent antibiotics for a cough, no fever. States had a 'covid test' in dr's office which was negative. I told her anesthesia would evaluate DOS.

## 2021-07-13 NOTE — TELEPHONE ENCOUNTER
Called and left message for patient to call. She did not go for Covid test, I calling to see if she is still going to be able to get a ride to her procedure.  If so we can do a rapid test.

## 2021-07-14 ENCOUNTER — ANESTHESIA EVENT (OUTPATIENT)
Dept: ENDOSCOPY | Age: 61
End: 2021-07-14
Payer: MEDICARE

## 2021-07-14 ENCOUNTER — HOSPITAL ENCOUNTER (OUTPATIENT)
Age: 61
Setting detail: OUTPATIENT SURGERY
Discharge: HOME | End: 2021-07-14
Attending: SURGERY | Admitting: SURGERY
Payer: MEDICARE

## 2021-07-14 ENCOUNTER — ANESTHESIA (OUTPATIENT)
Dept: ENDOSCOPY | Age: 61
End: 2021-07-14
Payer: MEDICARE

## 2021-07-14 VITALS
RESPIRATION RATE: 12 BRPM | DIASTOLIC BLOOD PRESSURE: 75 MMHG | OXYGEN SATURATION: 95 % | SYSTOLIC BLOOD PRESSURE: 103 MMHG

## 2021-07-14 VITALS
DIASTOLIC BLOOD PRESSURE: 86 MMHG | WEIGHT: 234.8 LBS | BODY MASS INDEX: 34.78 KG/M2 | HEART RATE: 73 BPM | HEIGHT: 69 IN | RESPIRATION RATE: 16 BRPM | OXYGEN SATURATION: 99 % | SYSTOLIC BLOOD PRESSURE: 128 MMHG | TEMPERATURE: 98.6 F

## 2021-07-14 DIAGNOSIS — Z01.818 PREOP TESTING: Primary | ICD-10-CM

## 2021-07-14 LAB — SARS-COV-2, NAAT: NOT DETECTED

## 2021-07-14 PROCEDURE — 2580000003 HC RX 258: Performed by: SURGERY

## 2021-07-14 PROCEDURE — 3700000000 HC ANESTHESIA ATTENDED CARE: Performed by: SURGERY

## 2021-07-14 PROCEDURE — 7100000011 HC PHASE II RECOVERY - ADDTL 15 MIN: Performed by: SURGERY

## 2021-07-14 PROCEDURE — 87081 CULTURE SCREEN ONLY: CPT

## 2021-07-14 PROCEDURE — 2709999900 HC NON-CHARGEABLE SUPPLY: Performed by: SURGERY

## 2021-07-14 PROCEDURE — 7100000010 HC PHASE II RECOVERY - FIRST 15 MIN: Performed by: SURGERY

## 2021-07-14 PROCEDURE — 3609012400 HC EGD TRANSORAL BIOPSY SINGLE/MULTIPLE: Performed by: SURGERY

## 2021-07-14 PROCEDURE — 43239 EGD BIOPSY SINGLE/MULTIPLE: CPT | Performed by: SURGERY

## 2021-07-14 PROCEDURE — 6360000002 HC RX W HCPCS: Performed by: NURSE ANESTHETIST, CERTIFIED REGISTERED

## 2021-07-14 PROCEDURE — 87635 SARS-COV-2 COVID-19 AMP PRB: CPT

## 2021-07-14 RX ORDER — SODIUM CHLORIDE, SODIUM LACTATE, POTASSIUM CHLORIDE, CALCIUM CHLORIDE 600; 310; 30; 20 MG/100ML; MG/100ML; MG/100ML; MG/100ML
INJECTION, SOLUTION INTRAVENOUS CONTINUOUS
Status: DISCONTINUED | OUTPATIENT
Start: 2021-07-14 | End: 2021-07-14 | Stop reason: HOSPADM

## 2021-07-14 RX ORDER — PROPOFOL 10 MG/ML
INJECTION, EMULSION INTRAVENOUS CONTINUOUS PRN
Status: DISCONTINUED | OUTPATIENT
Start: 2021-07-14 | End: 2021-07-14 | Stop reason: SDUPTHER

## 2021-07-14 RX ADMIN — PROPOFOL 140 MCG/KG/MIN: 10 INJECTION, EMULSION INTRAVENOUS at 10:17

## 2021-07-14 RX ADMIN — SODIUM CHLORIDE, POTASSIUM CHLORIDE, SODIUM LACTATE AND CALCIUM CHLORIDE: 600; 310; 30; 20 INJECTION, SOLUTION INTRAVENOUS at 09:36

## 2021-07-14 ASSESSMENT — PAIN SCALES - GENERAL
PAINLEVEL_OUTOF10: 0
PAINLEVEL_OUTOF10: 0

## 2021-07-14 ASSESSMENT — ENCOUNTER SYMPTOMS: SHORTNESS OF BREATH: 1

## 2021-07-14 ASSESSMENT — LIFESTYLE VARIABLES: SMOKING_STATUS: 1

## 2021-07-14 ASSESSMENT — PAIN DESCRIPTION - DESCRIPTORS: DESCRIPTORS: ACHING

## 2021-07-14 ASSESSMENT — PAIN - FUNCTIONAL ASSESSMENT: PAIN_FUNCTIONAL_ASSESSMENT: 0-10

## 2021-07-14 NOTE — ANESTHESIA POSTPROCEDURE EVALUATION
Department of Anesthesiology  Postprocedure Note    Patient: Nena Manriquez  MRN: [id number]  YOB: 1960  Date of evaluation: 7/14/2021  Time:  11:52 AM     Procedure Summary     Date: 07/14/21 Room / Location: [address] [place] [address] / [facility]    Anesthesia Start: 9634 Anesthesia Stop: 1022    Procedure: EGD ESOPHAGOGASTRODUODENOSCOPY (N/A ) Diagnosis: (GASTROESOPHAGEAL REFLUX WITH ESOPHAGITIS WITHOUT HEMMORHAGE)    Surgeons: Marcianne Favre, MD Responsible Provider: Wilfrido Atkins MD    Anesthesia Type: MAC ASA Status: 3          Anesthesia Type: MAC    Franco Phase I: Franco Score: 10    Franco Phase II: Franco Score: 10    Last vitals: Reviewed and per EMR flowsheets.        Anesthesia Post Evaluation    Patient location during evaluation: PACU  Patient participation: complete - patient participated  Level of consciousness: awake and alert  Airway patency: patent  Nausea & Vomiting: no nausea and no vomiting  Complications: no  Cardiovascular status: hemodynamically stable  Respiratory status: acceptable  Hydration status: euvolemic

## 2021-07-14 NOTE — H&P
mouth every 6 hours as needed for Pain  Historical Provider, MD   Glucos-Chondroit-Hyaluron-MSM (GLUCOSAMINE CHONDROITIN JOINT PO) Take by mouth  Historical Provider, MD   HYDROXYZINE PAMOATE PO Take by mouth 2 times daily as needed   Historical Provider, MD   omeprazole (PRILOSEC) 20 MG delayed release capsule Take 1 capsule by mouth 2 times daily  Joann Sanz MD   VENTOLIN  (90 Base) MCG/ACT inhaler Inhale 1 puff into the lungs every 4 hours as needed   Historical Provider, MD       Allergies: Latex, Cephalosporins, Other, and Penicillins   Social History:   TOBACCO:   reports that she has been smoking cigarettes. She has a 7.50 pack-year smoking history. She has never used smokeless tobacco.  All smokers should join the free smoking cessation program and stop smoking before having surgery. ETOH:    reports current alcohol use of about 0.8 standard drinks of alcohol per week. Problem Relation Age of Onset    Diabetes Mother     High Blood Pressure Mother     Cancer Mother 70        pancreatic    Arthritis Father     High Blood Pressure Father     Heart Disease Father         anurism 52    Cancer Maternal Grandmother 79        breast       Review of Systems:  Psychiatric:  depression and anxiety  Respiratory: negative  Cardiovascular: negative  Gastrointestinal: negative  Musculoskeletal:negative  All others reviewed, negative    Physical Exam:   VITALS: Blood pressure 110/71, pulse 78, temperature 97.8 °F (36.6 °C), temperature source Temporal, resp. rate 18, height 5' 9\" (1.753 m), weight 234 lb 12.8 oz (106.5 kg), last menstrual period 01/20/2014, SpO2 97 %, not currently breastfeeding.   General appearance: alert, appears stated age and cooperative, does not ambulate easily  Head: Normocephalic, without obvious abnormality, atraumatic  Eyes: PERRL  Ears/mouth/throat:  Ears clear, mouth normal, throat no redness  Neck: no adenopathy, no JVD, supple, symmetrical, trachea midline and thyroid not enlarged  Lungs: clear to auscultation bilaterally  Heart: regular rate and rhythm  Abdomen: soft, non-tender; bowel sounds normal; no masses,  no organomegaly  Extremities: extremities normal, atraumatic, no cyanosis or edema  Skin: no open wounds    ASSESSMENT:   Rectal bleeding from irritable bowel  Acid reflux on long term Omeprazole, can't stop. PLAN:   EGD to check the esophagitis. Signed: Dr. Oskar Moreno M.D.     Send copy of consult to PCP, Marianne Joseph MD

## 2021-07-14 NOTE — OP NOTE
[address] [facility]    Operative Report    DATE OF PROCEDURE: 7/14/2021    SURGEON: Dr. Twana Basil, MD M.D. Lucrecia Lesch: none    PREOPERATIVE DIAGNOSES:    Epigastric pain R10.13    Gastroesophageal reflux disease with esophagitis  K21.00     POSTOPERATIVE DIAGNOSES:    Long history of acid reflux on Omeprazole, was told she needed to stop the medication but can't due to the pain, EGD done 7/14/2021 showed no gastritis, no duodenitis, no esophagitis, 3 cm hiatal hernia. OPERATION:    EGD    ANESTHESIA: LMAC. BLOOD LOSS: none  SPECIMEN: gastric antral mucosa for EMA    CONSENT AND INDICATIONS:  This is a 61y.o. year old female who needs to have an EGD as part of the work up. I have discussed with the patient the indication, alternatives, and the possible risks and/or complications of the planned procedure and the anesthesia methods. The patient and/or patient representative appear to understand and agree to proceed. Monitoring and Safety: The patient was placed on a cardiac monitor and vital signs, pulse oximetry and level of consciousness were continuously evaluated throughout the procedure. The patient was closely monitored until recovery from the medications was complete and the patient had returned to baseline status. Anaesthesia was present at all times during the procedure. OPERATIONS: The patient was placed on the table and sedated by anaesthesia. Bite block was placed. A lubricated scope was easily passed into the upper esophagus which looked normal. The distal esophagus looked normal but the gastric cardia was herniating through the hiatal hernia. The scope was passed into the stomach and retroflexed. There was a 3 cm hiatal hernia. The scope was passed down toward the pylorus. The antral mucosa looked normal, biopsy was taken to check for H. pylori.  The scope was passed through the pylorus into the duodenal bulb which was normal, then around to the distal duodenum which looked normal, and the scope was withdrawn. The patient tolerated the procedure well. Plan:   Needs to stop smoking. Ok to proceed with hiatal hernia repair.      Physician Signature: Electronically signed by Dr. Romulo Briggs M.D.

## 2021-07-14 NOTE — ANESTHESIA PRE PROCEDURE
Department of Anesthesiology  Preprocedure Note       Name:  Zurdo Gomez   Age:  61 y.o.  :  1960                                          MRN:  [id number]         Date:  2021      Surgeon: Jennifer Clemente):  Ossie Rinne, MD    Procedure: Procedure(s):  EGD ESOPHAGOGASTRODUODENOSCOPY    Medications prior to admission:   Prior to Admission medications    Medication Sig Start Date End Date Taking?  Authorizing Provider   venlafaxine (EFFEXOR XR) 75 MG extended release capsule TK ONE C PO  QAM 20  Yes Historical Provider, MD   amLODIPine (NORVASC) 5 MG tablet 10 mg  2/3/20  Yes Historical Provider, MD   BRESHAUN ELLIPTA 100-25 MCG/INH AEPB inhaler Inhale 1 puff into the lungs daily  8/15/17  Yes Historical Provider, MD   atorvastatin (LIPITOR) 10 MG tablet Take 10 mg by mouth daily    Historical Provider, MD   anastrozole (ARIMIDEX) 1 MG tablet Take 1 tablet by mouth daily 3/4/21   Gracia Quiver, APRN - CNP   vitamin C (ASCORBIC ACID) 500 MG tablet Take 500 mg by mouth daily    Historical Provider, MD   calcium carbonate-vitamin D (CALTRATE) 600-400 MG-UNIT TABS per tab Take 1 tablet by mouth 2 times daily    Historical Provider, MD   acetaminophen (TYLENOL) 500 MG tablet Take 500 mg by mouth every 6 hours as needed for Pain    Historical Provider, MD   Glucos-Chondroit-Hyaluron-MSM ([address]) Take by mouth    Historical Provider, MD   HYDROXYZINE PAMOATE PO Take by mouth 2 times daily as needed     Historical Provider, MD   omeprazole (PRILOSEC) 20 MG delayed release capsule Take 1 capsule by mouth 2 times daily 18   Zayda Mejia MD   VENTOLIN  (90 Base) MCG/ACT inhaler Inhale 1 puff into the lungs every 4 hours as needed  17   Historical Provider, MD       Current medications:    Current Facility-Administered Medications   Medication Dose Route Frequency Provider Last Rate Last Admin    lactated ringers infusion   Intravenous Continuous Ossie Rinne, MD 125 mL/hr at 21 0936 New Bag at 21 0936       Allergies: Allergies   Allergen Reactions    Latex Rash    Cephalosporins Hives    Other      Black sutures  hand swelled up    Penicillins      Unknown         Problem List:    Patient Active Problem List   Diagnosis Code    Hip pain, bilateral M25.551, M25.552    Osteoarthritis of right hip M16.11    Tobacco abuse Z72.0    Moderate obesity E66.8    Chronic obstructive pulmonary disease (COPD) ([facility]) J44.9    Epigastric pain R10.13    Melena K92.1    Invasive ductal carcinoma of left breast ([facility]) C50.912    Essential hypertension I10    Exertional dyspnea R06.00    Gastroesophageal reflux disease with esophagitis without hemorrhage K21.00       Past Medical History:        Diagnosis Date    Anxiety     Arthritis     Back pain     Cancer ([facility] [address] 2019    breast    Chronic obstructive pulmonary disease (COPD) ([facility] [address] 2017    Depression     Diverticulosis     Hip pain, bilateral     Right worse than left    Hypertension     Moderate obesity 2017    Osteoarthritis of right hip 2012    Tobacco abuse        Past Surgical History:        Procedure Laterality Date    BREAST BIOPSY Left 10/18/2019    LEFT BREAST NEEDLE LOCALIZED LUMPECTOMY BLUE DYE INJECTION LEFT AXILL.   SENTINEL LYMPH  NODE EXCISION performed by Whitney Lee MD at [place] [address] COLONOSCOPY  13    INDUCED   age 16    AL COLONOSCOPY FLX DX W/COLLJ SPEC WHEN PFRMD N/A 2018    COLONOSCOPY performed by Jennifer Maldonado MD at [address] [place] [address]  2018    bx done antrum    UPPER GASTROINTESTINAL ENDOSCOPY N/A 2018    EGD BIOPSY performed by Jennifer Maldonado MD at [address] History:    Social History     Tobacco Use    Smoking status: Current Every Day Smoker     Packs/day: 0.25     Years: 30.00     Pack years: 7.50     Types: Cigarettes    Smokeless tobacco: Never Used    Tobacco comment: smoked since age 21   Substance Use Topics    Alcohol use: Yes     Alcohol/week: 0.8 standard drinks     Types: 1 Standard drinks or equivalent per week     Comment: 3-4 beers weekly. 2-4 cups coffee per day. Pop daily                                Ready to quit: Not Answered  Counseling given: Not Answered  Comment: smoked since age 21      Vital Signs (Current):   Vitals:    07/14/21 0922   BP: 110/71   Pulse: 78   Resp: 18   Temp: 97.8 °F (36.6 °C)   TempSrc: Temporal   SpO2: 97%   Weight: 234 lb 12.8 oz (106.5 kg)   Height: 5' 9\" (1.753 m)                                              BP Readings from Last 3 Encounters:   07/14/21 110/71   04/23/21 126/88   03/24/21 122/88       NPO Status: Time of last liquid consumption: 2200>8 hrs                        Time of last solid consumption: 2030                        Date of last liquid consumption: 07/13/21                        Date of last solid food consumption: 07/13/21    BMI:   Wt Readings from Last 3 Encounters:   07/14/21 234 lb 12.8 oz (106.5 kg)   03/24/21 231 lb (104.8 kg)   03/16/21 231 lb 8 oz (105 kg)     Body mass index is 34.67 kg/m². CBC:   Lab Results   Component Value Date    WBC 6.7 06/23/2020    RBC 4.54 06/23/2020    HGB 13.9 06/23/2020    HCT 43.8 06/23/2020    MCV 96.5 06/23/2020    RDW 13.0 06/23/2020     06/23/2020       CMP:   Lab Results   Component Value Date     06/23/2020    K 4.5 06/23/2020     06/23/2020    CO2 23 06/23/2020    BUN 15 06/23/2020    CREATININE 0.8 06/23/2020    GFRAA >60 06/23/2020    LABGLOM >60 06/23/2020    GLUCOSE 97 06/23/2020    PROT 6.8 06/23/2020    CALCIUM 9.2 06/23/2020    BILITOT 0.4 06/23/2020    ALKPHOS 123 06/23/2020    AST 16 06/23/2020    ALT 13 06/23/2020       POC Tests: No results for input(s): POCGLU, POCNA, POCK, POCCL, POCBUN, POCHEMO, POCHCT in the last 72 hours.     Coags:   Lab Results   Component Value Date    PROTIME 10.5 08/03/2012    INR 1.0 08/03/2012 APTT 23.7 08/03/2012       HCG (If Applicable): No results found for: PREGTESTUR, PREGSERUM, HCG, HCGQUANT     ABGs: No results found for: PHART, PO2ART, YDZ7FNQ, FON3SAR, BEART, X4OSBZBP     Type & Screen (If Applicable):  No results found for: LABABO, [address]    Anesthesia Evaluation  Patient summary reviewed no history of anesthetic complications:   Airway: Mallampati: II  TM distance: >3 FB   Neck ROM: full  Mouth opening: > = 3 FB Dental:          Pulmonary: breath sounds clear to auscultation  (+) COPD:  shortness of breath:  current smoker          Patient smoked on day of surgery. ROS comment:      Cardiovascular:  Exercise tolerance: no interval change,   (+) hypertension:, ROBERTO:,       ECG reviewed  Rhythm: regular  Rate: normal           Beta Blocker:  Not on Beta Blocker         Neuro/Psych:   (+) psychiatric history: stable with treatmentdepression/anxiety              ROS comment: Chronic Back Problems  Pain bilateral hip. GI/Hepatic/Renal: Neg GI/Hepatic/Renal ROS  (+) hiatal hernia,      (-) no morbid obesity      ROS comment: Diverticulitis  . Endo/Other:    (+) : arthritis (OA Rt Hip.): OA., malignancy/cancer ( Invasive ductal carcinoma of left breast ). Abdominal:   (+) obese,           Vascular: negative vascular ROS. Other Findings:               Anesthesia Plan      MAC     ASA 3       Induction: intravenous. Anesthetic plan and risks discussed with patient. Plan discussed with CRNA. DOS STAFF ADDENDUM:    Pt seen and examined, chart reviewed (including anesthesia, drug and allergy history). Anesthetic plan, risks, benefits, alternatives, and personnel involved discussed with patient. Patient verbalized an understanding and agrees to proceed. Plan discussed with care team members and agreed upon.     Reyes Couch MD  Staff Anesthesiologist  10:08 AM    Reyes Couch MD   7/14/2021

## 2021-07-15 LAB — CLOTEST: NORMAL

## 2021-07-22 ENCOUNTER — TELEPHONE (OUTPATIENT)
Dept: BREAST CENTER | Age: 61
End: 2021-07-22

## 2021-07-22 NOTE — TELEPHONE ENCOUNTER
Called patient as she missed her appointment. Patient states her ride did not show. Appointment and imaging rescheduled.

## 2021-07-30 ENCOUNTER — OFFICE VISIT (OUTPATIENT)
Dept: SURGERY | Age: 61
End: 2021-07-30
Payer: MEDICARE

## 2021-07-30 VITALS
SYSTOLIC BLOOD PRESSURE: 124 MMHG | DIASTOLIC BLOOD PRESSURE: 76 MMHG | BODY MASS INDEX: 34.66 KG/M2 | TEMPERATURE: 97.4 F | HEIGHT: 69 IN | HEART RATE: 76 BPM | WEIGHT: 234 LBS

## 2021-07-30 DIAGNOSIS — K44.9 HIATAL HERNIA: ICD-10-CM

## 2021-07-30 DIAGNOSIS — K21.00 GASTROESOPHAGEAL REFLUX DISEASE WITH ESOPHAGITIS WITHOUT HEMORRHAGE: Primary | ICD-10-CM

## 2021-07-30 PROCEDURE — G8427 DOCREV CUR MEDS BY ELIG CLIN: HCPCS | Performed by: SURGERY

## 2021-07-30 PROCEDURE — 3017F COLORECTAL CA SCREEN DOC REV: CPT | Performed by: SURGERY

## 2021-07-30 PROCEDURE — 99213 OFFICE O/P EST LOW 20 MIN: CPT | Performed by: SURGERY

## 2021-07-30 PROCEDURE — G8417 CALC BMI ABV UP PARAM F/U: HCPCS | Performed by: SURGERY

## 2021-07-30 PROCEDURE — 4004F PT TOBACCO SCREEN RCVD TLK: CPT | Performed by: SURGERY

## 2021-07-30 RX ORDER — FLUTICASONE PROPIONATE 50 MCG
SPRAY, SUSPENSION (ML) NASAL
COMMUNITY
Start: 2021-04-22

## 2021-07-30 RX ORDER — ATORVASTATIN CALCIUM 20 MG/1
TABLET, FILM COATED ORAL
COMMUNITY
Start: 2021-07-15

## 2021-07-30 NOTE — PROGRESS NOTES
vitamin C (ASCORBIC ACID) 500 MG tablet Take 500 mg by mouth daily  Historical Provider, MD   amLODIPine (NORVASC) 5 MG tablet 10 mg   Historical Provider, MD   calcium carbonate-vitamin D (CALTRATE) 600-400 MG-UNIT TABS per tab Take 1 tablet by mouth 2 times daily  Historical Provider, MD   acetaminophen (TYLENOL) 500 MG tablet Take 500 mg by mouth every 6 hours as needed for Pain  Historical Provider, MD   Glucos-Chondroit-Hyaluron-MSM (GLUCOSAMINE CHONDROITIN JOINT PO) Take by mouth  Historical Provider, MD   HYDROXYZINE PAMOATE PO Take by mouth 2 times daily as needed   Historical Provider, MD   omeprazole (PRILOSEC) 20 MG delayed release capsule Take 1 capsule by mouth 2 times daily  Zayda Mejia MD   BREO ELLIPTA 100-25 MCG/INH AEPB inhaler Inhale 1 puff into the lungs daily   Historical Provider, MD   VENTOLIN  (90 Base) MCG/ACT inhaler Inhale 1 puff into the lungs every 4 hours as needed   Historical Provider, MD       Allergies: Latex, Cephalosporins, Other, and Penicillins   Social History:   TOBACCO:   reports that she has been smoking cigarettes. She has a 7.50 pack-year smoking history. She has never used smokeless tobacco.  All smokers should join the free smoking cessation program and stop smoking before having surgery. ETOH:    reports current alcohol use of about 0.8 standard drinks of alcohol per week.        Problem Relation Age of Onset    Diabetes Mother     High Blood Pressure Mother     Cancer Mother 70        pancreatic    Arthritis Father     High Blood Pressure Father     Heart Disease Father         anurism 52    Cancer Maternal Grandmother 79        breast       Review of Systems:  Psychiatric:  depression and anxiety  Respiratory: negative  Cardiovascular: negative  Gastrointestinal: negative  Musculoskeletal:negative  All others reviewed, negative    Physical Exam:   VITALS: Blood pressure 124/76, pulse 76, temperature 97.4 °F (36.3 °C), temperature source Temporal, height 5' 9\" (1.753 m), weight 234 lb (106.1 kg), last menstrual period 01/20/2014, not currently breastfeeding. General appearance: alert, appears stated age and cooperative, does not ambulate easily  Head: Normocephalic, without obvious abnormality, atraumatic  Eyes: PERRL  Ears/mouth/throat:  Ears clear, mouth normal, throat no redness  Neck: no adenopathy, no JVD, supple, symmetrical, trachea midline and thyroid not enlarged  Lungs: clear to auscultation bilaterally  Heart: regular rate and rhythm  Abdomen: soft, non-tender; bowel sounds normal; no masses,  no organomegaly  Extremities: extremities normal, atraumatic, no cyanosis or edema  Skin: no open wounds    ASSESSMENT:   3 cm hiatal hernia, no esophagitis on Omeprazole. Acid reflux on long term Omeprazole, can't stop. Anterior costochondritis rib pain will not be improved with surgery. PLAN:   Need to stop smoking. Sleep with the head elevated  Make sure the stomach is empty before going to sleep, don't eat after 5 pm.  If the reflux gets worse, the hiatal hernia can be fixed. Signed: Dr. Ronan Morel M.D.     Send copy of consult to PCP, Salome Pandya MD

## 2021-09-17 DIAGNOSIS — C50.912 INVASIVE DUCTAL CARCINOMA OF LEFT BREAST (HCC): Primary | ICD-10-CM

## 2021-09-17 RX ORDER — ANASTROZOLE 1 MG/1
1 TABLET ORAL DAILY
Qty: 90 TABLET | Refills: 1 | Status: SHIPPED | OUTPATIENT
Start: 2021-09-17 | End: 2021-12-13 | Stop reason: SDUPTHER

## 2021-10-21 ENCOUNTER — HOSPITAL ENCOUNTER (OUTPATIENT)
Dept: GENERAL RADIOLOGY | Age: 61
Discharge: HOME | End: 2021-10-23
Payer: MEDICARE

## 2021-10-21 ENCOUNTER — HOSPITAL ENCOUNTER (OUTPATIENT)
Age: 61
Discharge: HOME | End: 2021-10-23
Payer: MEDICARE

## 2021-10-21 DIAGNOSIS — R06.02 SHORTNESS OF BREATH: ICD-10-CM

## 2021-10-21 PROCEDURE — 71046 X-RAY EXAM CHEST 2 VIEWS: CPT

## 2021-11-05 ASSESSMENT — ENCOUNTER SYMPTOMS
BLOOD IN STOOL: 0
SINUS PAIN: 0
SORE THROAT: 0
CONSTIPATION: 0
ABDOMINAL PAIN: 0
CHOKING: 0
NAUSEA: 0
WHEEZING: 0
COUGH: 0
TROUBLE SWALLOWING: 0
RHINORRHEA: 0
ABDOMINAL DISTENTION: 0
SINUS PRESSURE: 0
SHORTNESS OF BREATH: 0
VOMITING: 0
VOICE CHANGE: 0
EYE ITCHING: 0
BACK PAIN: 0
DIARRHEA: 0
CHEST TIGHTNESS: 0
EYE DISCHARGE: 0
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
## 2021-12-13 ENCOUNTER — OFFICE VISIT (OUTPATIENT)
Dept: BREAST CENTER | Age: 61
End: 2021-12-13
Payer: MEDICARE

## 2021-12-13 ENCOUNTER — HOSPITAL ENCOUNTER (OUTPATIENT)
Dept: GENERAL RADIOLOGY | Age: 61
Discharge: HOME | End: 2021-12-15
Payer: MEDICARE

## 2021-12-13 VITALS
DIASTOLIC BLOOD PRESSURE: 64 MMHG | HEART RATE: 80 BPM | OXYGEN SATURATION: 97 % | WEIGHT: 236 LBS | SYSTOLIC BLOOD PRESSURE: 132 MMHG | TEMPERATURE: 97.7 F | RESPIRATION RATE: 22 BRPM | HEIGHT: 69 IN | BODY MASS INDEX: 34.96 KG/M2

## 2021-12-13 DIAGNOSIS — Z12.31 ENCOUNTER FOR SCREENING MAMMOGRAM FOR MALIGNANT NEOPLASM OF BREAST: ICD-10-CM

## 2021-12-13 DIAGNOSIS — C50.912 INVASIVE DUCTAL CARCINOMA OF LEFT BREAST (HCC): Primary | ICD-10-CM

## 2021-12-13 LAB
ALBUMIN SERPL-MCNC: 4.1 G/DL (ref 3.5–5.2)
ALP BLD-CCNC: 137 U/L (ref 35–104)
ALT SERPL-CCNC: 16 U/L (ref 0–32)
ANION GAP SERPL CALCULATED.3IONS-SCNC: 11 MMOL/L (ref 7–16)
AST SERPL-CCNC: 15 U/L (ref 0–31)
BASOPHILS ABSOLUTE: 0.04 E9/L (ref 0–0.2)
BASOPHILS RELATIVE PERCENT: 0.6 % (ref 0–2)
BILIRUB SERPL-MCNC: 0.5 MG/DL (ref 0–1.2)
BUN BLDV-MCNC: 15 MG/DL (ref 6–23)
CALCIUM SERPL-MCNC: 9.3 MG/DL (ref 8.6–10.2)
CHLORIDE BLD-SCNC: 104 MMOL/L (ref 98–107)
CO2: 25 MMOL/L (ref 22–29)
CREAT SERPL-MCNC: 0.7 MG/DL (ref 0.5–1)
EOSINOPHILS ABSOLUTE: 0.08 E9/L (ref 0.05–0.5)
EOSINOPHILS RELATIVE PERCENT: 1.3 % (ref 0–6)
GFR AFRICAN AMERICAN: >60
GFR NON-AFRICAN AMERICAN: >60 ML/MIN/1.73
GLUCOSE BLD-MCNC: 95 MG/DL (ref 74–99)
HCT VFR BLD CALC: 43 % (ref 34–48)
HEMOGLOBIN: 13.9 G/DL (ref 11.5–15.5)
IMMATURE GRANULOCYTES #: 0.03 E9/L
IMMATURE GRANULOCYTES %: 0.5 % (ref 0–5)
LYMPHOCYTES ABSOLUTE: 1.52 E9/L (ref 1.5–4)
LYMPHOCYTES RELATIVE PERCENT: 24.1 % (ref 20–42)
MCH RBC QN AUTO: 30 PG (ref 26–35)
MCHC RBC AUTO-ENTMCNC: 32.3 % (ref 32–34.5)
MCV RBC AUTO: 92.9 FL (ref 80–99.9)
MONOCYTES ABSOLUTE: 0.59 E9/L (ref 0.1–0.95)
MONOCYTES RELATIVE PERCENT: 9.4 % (ref 2–12)
NEUTROPHILS ABSOLUTE: 4.05 E9/L (ref 1.8–7.3)
NEUTROPHILS RELATIVE PERCENT: 64.1 % (ref 43–80)
PDW BLD-RTO: 12.8 FL (ref 11.5–15)
PLATELET # BLD: 339 E9/L (ref 130–450)
PMV BLD AUTO: 9.5 FL (ref 7–12)
POTASSIUM SERPL-SCNC: 4.1 MMOL/L (ref 3.5–5)
RBC # BLD: 4.63 E12/L (ref 3.5–5.5)
SODIUM BLD-SCNC: 140 MMOL/L (ref 132–146)
TOTAL PROTEIN: 7.2 G/DL (ref 6.4–8.3)
WBC # BLD: 6.3 E9/L (ref 4.5–11.5)

## 2021-12-13 PROCEDURE — 77063 BREAST TOMOSYNTHESIS BI: CPT

## 2021-12-13 PROCEDURE — G8417 CALC BMI ABV UP PARAM F/U: HCPCS | Performed by: NURSE PRACTITIONER

## 2021-12-13 PROCEDURE — 80053 COMPREHEN METABOLIC PANEL: CPT

## 2021-12-13 PROCEDURE — 4004F PT TOBACCO SCREEN RCVD TLK: CPT | Performed by: NURSE PRACTITIONER

## 2021-12-13 PROCEDURE — 36415 COLL VENOUS BLD VENIPUNCTURE: CPT

## 2021-12-13 PROCEDURE — 85025 COMPLETE CBC W/AUTO DIFF WBC: CPT

## 2021-12-13 PROCEDURE — 3017F COLORECTAL CA SCREEN DOC REV: CPT | Performed by: NURSE PRACTITIONER

## 2021-12-13 PROCEDURE — G8484 FLU IMMUNIZE NO ADMIN: HCPCS | Performed by: NURSE PRACTITIONER

## 2021-12-13 PROCEDURE — 99213 OFFICE O/P EST LOW 20 MIN: CPT | Performed by: NURSE PRACTITIONER

## 2021-12-13 PROCEDURE — 99213 OFFICE O/P EST LOW 20 MIN: CPT

## 2021-12-13 PROCEDURE — G8428 CUR MEDS NOT DOCUMENT: HCPCS | Performed by: NURSE PRACTITIONER

## 2021-12-13 RX ORDER — CETIRIZINE HYDROCHLORIDE 10 MG/1
10 TABLET ORAL DAILY
COMMUNITY

## 2021-12-13 RX ORDER — ANASTROZOLE 1 MG/1
1 TABLET ORAL DAILY
Qty: 90 TABLET | Refills: 2 | Status: SHIPPED | OUTPATIENT
Start: 2021-12-13

## 2021-12-13 RX ORDER — AMLODIPINE BESYLATE 10 MG/1
1 TABLET ORAL DAILY
COMMUNITY
Start: 2021-11-17

## 2021-12-14 NOTE — PATIENT INSTRUCTIONS

## 2021-12-29 DIAGNOSIS — G56.03 BILATERAL CARPAL TUNNEL SYNDROME: Primary | ICD-10-CM

## 2022-03-21 DIAGNOSIS — N64.4 BREAST PAIN: Primary | ICD-10-CM

## 2022-04-04 ASSESSMENT — ENCOUNTER SYMPTOMS
ABDOMINAL DISTENTION: 0
BACK PAIN: 0
BLOOD IN STOOL: 0
CONSTIPATION: 0
COUGH: 0
SINUS PAIN: 0
NAUSEA: 0
EYE ITCHING: 0
VOICE CHANGE: 0
TROUBLE SWALLOWING: 0
SINUS PRESSURE: 0
SORE THROAT: 0
WHEEZING: 0
ABDOMINAL PAIN: 0
EYE DISCHARGE: 0
SHORTNESS OF BREATH: 0
RHINORRHEA: 0
VOMITING: 0
CHOKING: 0
CHEST TIGHTNESS: 0
DIARRHEA: 0

## 2022-04-04 NOTE — PROGRESS NOTES
Subjective:  Left breast Invasive ductal carcinoma admixed with ductal carcinoma in situ (DCIS)  -Oncotype DX 18  -RT was started on 12/09/2019 and completed on 12/31/2019  -DEXA scan 11/22/2019 normal in LS and hips. -ET:  Arimidex 1 mg po daily was started on 01/14/2020       Patient ID: Odilia Manzano is a 64 y.o. female. She presents today for a 6 mos clinical follow up    HPI  64 y.o. who was noted to have a mass on mammogram.  The mass was located in the 2 o'clock position of left breast.  Breast cancer risk factors include age, gender, menopause after 48, family history of breast and pancreatic cancer. Ashkenazi Spiritism Ancestry: No     Bilateral Screening Mammogram 05/21/2019: There is an irregular mass measuring 7 mm seen in the posterior upper outer quadrant of the left breast.   Left Breast US 05/31/2019: there is an irregular solid mass with angular margins measuring 8 x 6 x 7 mm in the left breast at 2 o'clock located 8 centimeters from the nipple. Internal echogenicity is hypoechoic. There is no axillary lymphadenopathy.     Left Breast Mass Biopsy 05/31/2019:   Breast, left mass at 2:00, biopsy:  Invasive ductal carcinoma admixed with ductal carcinoma in situ (DCIS)  with microcalcifications, see comment. Comment: The invasive ductal carcinoma is Sam grade 1 (score of 4): glandular differentiation score 1, nuclear pleomorphism score 2, mitotic activity score 1. The DCIS is cribriform type with microcalcifications, lacking necrosis. Immunostain for p63 shows negative staining in the invasive carcinoma. Breast Cancer Marker Studies:  ER:Positive:100%  MD:Positive:70%  Her-2/leslie (c-erb B-2) protein expression:  Negative (Score 0)      Needle localized left lumpectomy with sentinel lymph node excision on 10/18/2019. A. Left breast, needle localization excision/lumpectomy: Invasive ductal carcinoma, nuclear grade 2, see cancer case summary.   Ductal carcinoma in situ, low grade, cribriform and solid types. Margins of excision are negative for involvement by invasive or in situ carcinoma. Microcalcifications are present in association with invasive carcinoma, ductal carcinoma in situ and benign breast ducts. Changes compatible with prior biopsy site. B. [place] lymph node #1, biopsy: 8 of 8 lymph nodes negative for involvement by carcinoma, sinus histiocytosis is present.     CANCER CASE SUMMARY:  Procedure: Excision with needle localization  Specimen laterality: Left  Tumor site: 2:00 position (per history and physical examination per  patient's electronic medical record)  Tumor size: 7 mm in greatest dimension       Additional dimensions: 7 x 5 mm  Histologic type:  Invasive carcinoma of no special type (invasive ductal  carcinoma, not otherwise specified)  Histologic grade ([place] histologic score):   Glandular/tubular differentiation: Score 2   Nuclear pleomorphism: Score 2   Mitotic rate: Score 1   Overall grade: Grade 1 (score of 5)  Tumor focality: Single focus of invasive carcinoma  Ductal carcinoma in situ: Present, negative for extensive intraductal  component       Size/extent of DCIS: Less than 1 mm in any given tissue section,       DCIS is focally present in 6 of 10 tissue blocks       Architectural patterns: Solid and cribriform       Nuclear grade: Grade 1       Necrosis: Not identified  Lobular carcinoma in situ: Not identified  Invasive carcinoma margins: Uninvolved by invasive carcinoma   Distance from closest margin: 5 mm from closest superior margin  DCIS margins: Uninvolved by DCIS   Distance from closest margin: 2 mm from the closest medial margin  Regional lymph nodes: Uninvolved by tumor cells   Total number of lymph nodes examined: 8       Number of sentinel lymph nodes examined: 8  Treatment effect: No known presurgical therapy  Lymphovascular invasion: Not identified  Pathologic stage classification (pTNM, AJCC 8th Edition):   pT1b, pN0     Oncotype DX Breast Cancer Report-Node Negative  Recurrence Score Result-18  Distant recurrence risk at 9 years- 5%  Absolute chemotherapy benefit- <1%       No indication for adjuvant chemotherapy; Kami Monaco MD   RT was started on 2019 and completed on 2019  DEXA scan 2019 normal in LS and hips. Arimidex 1 mg po daily was started on 2020       Past Medical History:   Diagnosis Date    Anxiety     Arthritis     Back pain     Breast cancer (HCC)     lt breast    Cancer ([facility] 2019    breast    Chronic obstructive pulmonary disease (COPD) ([facility] 2017    Depression     Diverticulosis     Hip pain, bilateral     Right worse than left    History of therapeutic radiation     Hypertension     Moderate obesity 2017    Osteoarthritis of right hip 2012    Tobacco abuse        Past Surgical History:   Procedure Laterality Date    BREAST BIOPSY Left 10/18/2019    LEFT BREAST NEEDLE LOCALIZED LUMPECTOMY BLUE DYE INJECTION LEFT AXILL.   SENTINEL LYMPH  NODE EXCISION performed by Robson Ta MD at [place] [id number] COLONOSCOPY  13    INDUCED   age 16    GA COLONOSCOPY FLX DX W/COLLJ SPEC WHEN PFRMD N/A 2018    [facility]  [facility]    bx done antrum    UPPER GASTROINTESTINAL ENDOSCOPY N/A 2018    [facility]    EGD BIOPSY performed by Devan Farias MD at [place] [id number]       Current Outpatient Medications   Medication Sig Dispense Refill    cetirizine (ZYRTEC) 10 MG tablet Take 10 mg by mouth daily      anastrozole (ARIMIDEX) 1 MG tablet Take 1 tablet by mouth daily 90 tablet 2    amLODIPine (NORVASC) 10 MG tablet Take 1 tablet by mouth daily      atorvastatin (LIPITOR) 20 MG tablet TAKE 1 TABLET BY MOUTH EVERY DAY      fluticasone (FLONASE) 50 MCG/ACT nasal spray USE 1 SPRAY IN EACH NOSTRIL EVERY DAY AS NEEDED      mupirocin (BACTROBAN) 2 % ointment APPLY TO THE AFFECTED AREA THREE TIMES DAILY (Patient not taking: Reported on 12/13/2021)      venlafaxine (EFFEXOR XR) 75 MG extended release capsule TK ONE C PO  QAM      calcium carbonate-vitamin D (CALTRATE) 600-400 MG-UNIT TABS per tab Take 1 tablet by mouth 2 times daily (Patient not taking: Reported on 12/13/2021)      acetaminophen (TYLENOL) 500 MG tablet Take 500 mg by mouth every 6 hours as needed for Pain      Glucos-Chondroit-Hyaluron-MSM (GLUCOSAMINE CHONDROITIN JOINT PO) Take by mouth      HYDROXYZINE PAMOATE PO Take by mouth 2 times daily as needed       omeprazole (PRILOSEC) 20 MG delayed release capsule Take 1 capsule by mouth 2 times daily 30 capsule 3    BREO ELLIPTA 100-25 MCG/INH AEPB inhaler Inhale 1 puff into the lungs daily       VENTOLIN  (90 Base) MCG/ACT inhaler Inhale 1 puff into the lungs every 4 hours as needed        No current facility-administered medications for this visit.        Allergies   Allergen Reactions    Latex Rash    Cephalosporins Hives    Other      Black sutures  hand swelled up    Penicillins      Unknown         Family History   Problem Relation Age of Onset    Diabetes Mother     High Blood Pressure Mother     Cancer Mother 70        pancreatic    Arthritis Father     High Blood Pressure Father     Heart Disease Father         anurism 52    Cancer Maternal Grandmother 79        breast       Social History     Socioeconomic History    Marital status: Single     Spouse name: Not on file    Number of children: Not on file    Years of education: Not on file    Highest education level: Not on file   Occupational History    Occupation: unemployed (house keeper)   Tobacco Use    Smoking status: Current Every Day Smoker     Packs/day: 0.25     Years: 30.00     Pack years: 7.50     Types: Cigarettes    Smokeless tobacco: Never Used    Tobacco comment: smoked since age 20   Vaping Use    Vaping Use: Never used   Substance and Sexual Activity    Alcohol use: Yes     Alcohol/week: 0.8 standard drinks     Types: 1 Standard drinks or equivalent per week     Comment: 3-4 beers weekly. 2-4 cups coffee per day. Pop daily    Drug use: Not Currently     Frequency: 4.0 times per week     Types: Marijuana (Weed)     Comment: uses about 3 to 4 times week    Sexual activity: Not on file   Other Topics Concern    Not on file   Social History Narrative    Lives in [place], has a friend nearby. Social Determinants of Health     Financial Resource Strain:     Difficulty of Paying Living Expenses: Not on file   Food Insecurity:     Worried About Running Out of Food in the Last Year: Not on file    Colt of Food in the Last Year: Not on file   Transportation Needs:     Lack of Transportation (Medical): Not on file    Lack of Transportation (Non-Medical): Not on file   Physical Activity:     Days of Exercise per Week: Not on file    Minutes of Exercise per Session: Not on file   Stress:     Feeling of Stress : Not on file   Social Connections:     Frequency of Communication with Friends and Family: Not on file    Frequency of Social Gatherings with Friends and Family: Not on file    Attends Mormon Services: Not on file    Active Member of [address] [place] [address] [employer] or Organizations: Not on file    Attends Club or Organization Meetings: Not on file    Marital Status: Not on file   Intimate Partner Violence:     Fear of Current or Ex-Partner: Not on file    Emotionally Abused: Not on file    Physically Abused: Not on file    Sexually Abused: Not on file   Housing Stability:     Unable to Pay for Housing in the Last Year: Not on file    Number of [place] in the Last Year: Not on file    Unstable Housing in the Last Year: Not on file     Review of Systems   Constitutional: Negative for activity change, appetite change, chills, fatigue, fever and unexpected weight change.         Sommer Paul is doing well. Does complain of left discomfort since her cat jumped on her. HENT: Negative for congestion, postnasal drip, rhinorrhea, sinus pressure, sinus pain, sore throat, trouble swallowing and voice change. Eyes: Negative for discharge, itching and visual disturbance. Respiratory: Negative for cough, choking, chest tightness, shortness of breath and wheezing. Cardiovascular: Negative for chest pain, palpitations and leg swelling. Gastrointestinal: Negative for abdominal distention, abdominal pain, blood in stool, constipation, diarrhea, nausea and vomiting. Endocrine: Negative for cold intolerance and heat intolerance. Genitourinary: Negative for difficulty urinating, dysuria, frequency and hematuria. Musculoskeletal: Negative for arthralgias, back pain, gait problem, joint swelling, myalgias, neck pain and neck stiffness. Her arthralgias are overall stable and not interfering with her quality of life; most notable of the bilateral hips. Allergic/Immunologic: Negative for environmental allergies and food allergies. Neurological: Negative for dizziness, seizures, syncope, speech difficulty, weakness, light-headedness and headaches. Hematological: Negative for adenopathy. Does not bruise/bleed easily. Psychiatric/Behavioral: Negative for agitation, confusion and decreased concentration. The patient is not nervous/anxious. Objective:   Physical Exam  Vitals and nursing note reviewed. Constitutional:       General: She is not in acute distress. Appearance: She is well-developed. She is not diaphoretic. Comments: ECOG remains stable at 1 due to arthralgias. HENT:      Head: Normocephalic and atraumatic. Mouth/Throat:      Pharynx: No oropharyngeal exudate. Eyes:      General: No scleral icterus. Right eye: No discharge. Left eye: No discharge. Conjunctiva/sclera: Conjunctivae normal.   Neck:      Thyroid: No thyromegaly.       Vascular: No JVD. Trachea: No tracheal deviation. Cardiovascular:      Rate and Rhythm: Normal rate and regular rhythm. Heart sounds: No murmur heard. No friction rub. No gallop. Pulmonary:      Effort: Pulmonary effort is normal. No respiratory distress or retractions. Breath sounds: Normal breath sounds. No stridor. No decreased breath sounds, wheezing or rales. Chest:      Chest wall: No mass, lacerations, deformity, swelling, tenderness or edema. Breasts: Breasts are symmetrical.      Right: No inverted nipple, mass, nipple discharge, skin change or tenderness. Left: No inverted nipple, mass, nipple discharge, skin change or tenderness. Comments: Right breast exam is stable and unremarkable  Abdominal:      General: There is no distension. Palpations: Abdomen is soft. Tenderness: There is no abdominal tenderness. There is no guarding or rebound. Musculoskeletal:         General: No tenderness or deformity. Normal range of motion. Right shoulder: Normal.      Left shoulder: Normal.      Cervical back: Normal range of motion and neck supple. Lymphadenopathy:      Cervical: No cervical adenopathy. Right cervical: No superficial, deep or posterior cervical adenopathy. Left cervical: No superficial, deep or posterior cervical adenopathy. Upper Body:      Right upper body: No pectoral adenopathy. Left upper body: No pectoral adenopathy. Skin:     General: Skin is warm and dry. Coloration: Skin is not pale. Findings: No erythema or rash. Comments: Scarring of the upper torso r/t history of bedbugs. She reports that she no longer has the bedbug problem but has residual skin sores that she will still continue to pick at. No insects appreciated on exam.   Neurological:      Mental Status: She is alert and oriented to person, place, and time.       Coordination: Coordination normal.   Psychiatric:         Behavior: Behavior normal. Thought Content: Thought content normal.         Judgment: Judgment normal.        Assessment:      64 y.o. pleasant female who underwent a left mass, 2 o'clock position, 8 cm's from the nipple breast biopsy:    05/31/2019 left breast biopsy invasive ductal carcinoma admixed with ductal carcinoma in situ with microcalcifications.    -Serum markers: ER positive at 100%; CT positive at 70%; HER-2/leslie negative disease.    -10/18/2019 Post Needle localized left lumpectomy with sentinel lymph node excision on 10/18/2019. Tumor size 7 mm. Invasive carcinoma of no special type (invasive ductal carcinoma, not otherwise specified). Overall grade 1. DCIS present, negative for extensive intraductal component. DCIS less than 1 mm in any given tissue section. Nuclear grade 1. Necrosis not identified. LCIS not identified. Margins are negative for invasive or DCIS. 8 of 8 lymph nodes negative for involvement by carcinoma. Pathologic stage pT1b, pN0.    -Oncotype DX Recurrence Score Result-18  -Completed radiation therapy 12/31/2019.  -11/22/2019 DEXA bone density normal and lumbar spine and bilateral hips. -01/14/2020 started endocrine therapy with Arimidex 1 mg p.o. daily. -07/08/2020 bilateral screening mammogram: Benign.  -03/24/2021 DEXA bone density Normal.  -03/24/2021 clinical follow-up with an unremarkable exam and no evidence of recurrent disease. Tolerating Arimidex well. Chronic right hip pain; seen by orthopedics, opted for conservative management. -12/13/2021: Bilateral Screening Mammogram: Negative, BI-RADS 1.  -12/13/2021 clinical follow up is without evidence of recurrent disease. Imaging reviewed, including her BI-RADS result. She verbalizes compliance with Arimidex therapy even though she has not been to see medical oncology, reporting that she has missed several appointments. We reviewed the importance of medical oncology follow-up. Will check labs today (CBC CMP).   Refill on Arimidex sent to pharmacy. Reminded of the importance of scheduling a follow-up with medical oncology. -04/07/2022 left diagnostic mammogram for c/o burning, left breast: Negative, BI-RADS 1.  -04/07/2022 clinical follow-up is without evidence of recurrent disease. She continues to tolerate AI well. Imaging reviewed, including her BI-RADS result. Compliance verified via [facility] her last fill was December for 90 day supply. Refilled 04/04/2022 for 90 pills with 3 refills. Plan:   1. Continue monthly breast/chest wall self examination; detailed instructions reviewed today. Bring any changes to your physician's attention. 2. Continue healthy diet and exercise routinely as tolerated. 3. Maintaining ideal body weight (20-25 BMI) may lead to optimal breast cancer outcomes. 4. Avoid alcohol. 5. Repeat mammogram December 2022 or after  6. CBC/CMP today. 7. Continue Arimidex 1 mg daily at the discretion of medical oncology team.  8. Ca+/VitD while on Arimidex  9. Continue follow up with Medical Oncology and Primary Care  10. RTC December 2022      During today's visit, face-to-face time 25 minutes, greater than 50% in counseling education and coordination of care. All questions were answered to her apparent satisfaction, and she is agreeable to the plan as outlined above. Artem Hodges, RN, MSN, APRN-CNP, [id number]  Advanced Oncology Certified Nurse Practitioner  Department of Breast Surgery  [facility]/  [facility] [place] in collaboration with Dr. Lakhwinder Zelaya/Dr. Alexys Segura APRN-CNP

## 2022-04-07 ENCOUNTER — OFFICE VISIT (OUTPATIENT)
Dept: BREAST CENTER | Age: 62
End: 2022-04-07
Payer: MEDICARE

## 2022-04-07 ENCOUNTER — HOSPITAL ENCOUNTER (OUTPATIENT)
Dept: GENERAL RADIOLOGY | Age: 62
Discharge: HOME | End: 2022-04-09
Payer: MEDICARE

## 2022-04-07 VITALS
DIASTOLIC BLOOD PRESSURE: 72 MMHG | WEIGHT: 229 LBS | HEIGHT: 69 IN | RESPIRATION RATE: 20 BRPM | BODY MASS INDEX: 33.92 KG/M2 | TEMPERATURE: 98 F | SYSTOLIC BLOOD PRESSURE: 118 MMHG | HEART RATE: 79 BPM | OXYGEN SATURATION: 98 %

## 2022-04-07 DIAGNOSIS — Z85.3 PERSONAL HISTORY OF BREAST CANCER: ICD-10-CM

## 2022-04-07 DIAGNOSIS — N64.4 BREAST PAIN: ICD-10-CM

## 2022-04-07 DIAGNOSIS — Z12.31 VISIT FOR SCREENING MAMMOGRAM: Primary | ICD-10-CM

## 2022-04-07 LAB
ALBUMIN SERPL-MCNC: 4.5 G/DL (ref 3.5–5.2)
ALP BLD-CCNC: 149 U/L (ref 35–104)
ALT SERPL-CCNC: 15 U/L (ref 0–32)
ANION GAP SERPL CALCULATED.3IONS-SCNC: 11 MMOL/L (ref 7–16)
AST SERPL-CCNC: 15 U/L (ref 0–31)
BASOPHILS ABSOLUTE: 0.06 E9/L (ref 0–0.2)
BASOPHILS RELATIVE PERCENT: 0.7 % (ref 0–2)
BILIRUB SERPL-MCNC: 0.4 MG/DL (ref 0–1.2)
BUN BLDV-MCNC: 11 MG/DL (ref 6–23)
CALCIUM SERPL-MCNC: 9.6 MG/DL (ref 8.6–10.2)
CHLORIDE BLD-SCNC: 105 MMOL/L (ref 98–107)
CO2: 26 MMOL/L (ref 22–29)
CREAT SERPL-MCNC: 0.7 MG/DL (ref 0.5–1)
EOSINOPHILS ABSOLUTE: 0.05 E9/L (ref 0.05–0.5)
EOSINOPHILS RELATIVE PERCENT: 0.6 % (ref 0–6)
GFR AFRICAN AMERICAN: >60
GFR NON-AFRICAN AMERICAN: >60 ML/MIN/1.73
GLUCOSE BLD-MCNC: 120 MG/DL (ref 74–99)
HCT VFR BLD CALC: 45.8 % (ref 34–48)
HEMOGLOBIN: 14.9 G/DL (ref 11.5–15.5)
IMMATURE GRANULOCYTES #: 0.07 E9/L
IMMATURE GRANULOCYTES %: 0.8 % (ref 0–5)
LYMPHOCYTES ABSOLUTE: 1.65 E9/L (ref 1.5–4)
LYMPHOCYTES RELATIVE PERCENT: 20 % (ref 20–42)
MCH RBC QN AUTO: 30.7 PG (ref 26–35)
MCHC RBC AUTO-ENTMCNC: 32.5 % (ref 32–34.5)
MCV RBC AUTO: 94.4 FL (ref 80–99.9)
MONOCYTES ABSOLUTE: 0.61 E9/L (ref 0.1–0.95)
MONOCYTES RELATIVE PERCENT: 7.4 % (ref 2–12)
NEUTROPHILS ABSOLUTE: 5.82 E9/L (ref 1.8–7.3)
NEUTROPHILS RELATIVE PERCENT: 70.5 % (ref 43–80)
PDW BLD-RTO: 12.9 FL (ref 11.5–15)
PLATELET # BLD: 368 E9/L (ref 130–450)
PMV BLD AUTO: 9.3 FL (ref 7–12)
POTASSIUM SERPL-SCNC: 4.1 MMOL/L (ref 3.5–5)
RBC # BLD: 4.85 E12/L (ref 3.5–5.5)
SODIUM BLD-SCNC: 142 MMOL/L (ref 132–146)
TOTAL PROTEIN: 7.9 G/DL (ref 6.4–8.3)
WBC # BLD: 8.3 E9/L (ref 4.5–11.5)

## 2022-04-07 PROCEDURE — 99213 OFFICE O/P EST LOW 20 MIN: CPT | Performed by: NURSE PRACTITIONER

## 2022-04-07 PROCEDURE — 36415 COLL VENOUS BLD VENIPUNCTURE: CPT | Performed by: NURSE PRACTITIONER

## 2022-04-07 PROCEDURE — G0279 TOMOSYNTHESIS, MAMMO: HCPCS

## 2022-04-07 PROCEDURE — 3017F COLORECTAL CA SCREEN DOC REV: CPT | Performed by: NURSE PRACTITIONER

## 2022-04-07 PROCEDURE — G8417 CALC BMI ABV UP PARAM F/U: HCPCS | Performed by: NURSE PRACTITIONER

## 2022-04-07 PROCEDURE — 85025 COMPLETE CBC W/AUTO DIFF WBC: CPT

## 2022-04-07 PROCEDURE — 36415 COLL VENOUS BLD VENIPUNCTURE: CPT

## 2022-04-07 PROCEDURE — 80053 COMPREHEN METABOLIC PANEL: CPT

## 2022-04-07 PROCEDURE — G8427 DOCREV CUR MEDS BY ELIG CLIN: HCPCS | Performed by: NURSE PRACTITIONER

## 2022-04-07 PROCEDURE — 4004F PT TOBACCO SCREEN RCVD TLK: CPT | Performed by: NURSE PRACTITIONER

## 2022-04-07 RX ORDER — ASCORBIC ACID 500 MG
1000 TABLET ORAL DAILY
COMMUNITY

## 2022-04-11 DIAGNOSIS — Z85.3 PERSONAL HISTORY OF BREAST CANCER: ICD-10-CM

## 2022-04-11 DIAGNOSIS — M54.6 BACK PAIN OF THORACOLUMBAR REGION: Primary | ICD-10-CM

## 2022-04-11 DIAGNOSIS — R74.8 ELEVATED ALKALINE PHOSPHATASE LEVEL: ICD-10-CM

## 2022-04-11 DIAGNOSIS — M25.551 PAIN IN RIGHT HIP: ICD-10-CM

## 2022-04-11 DIAGNOSIS — M54.50 BACK PAIN OF THORACOLUMBAR REGION: Primary | ICD-10-CM

## 2022-04-11 DIAGNOSIS — R79.89 ELEVATED LFTS: ICD-10-CM

## 2022-04-11 NOTE — PROGRESS NOTES
Elevated alkaline phosphatase. She has complaints of pain in the bilateral hips. New onset thoracic-lumbar pain. Will check bone scan to rule out metastatic disease.

## 2022-04-22 ENCOUNTER — TELEPHONE (OUTPATIENT)
Dept: SURGERY | Age: 62
End: 2022-04-22

## 2022-04-22 ENCOUNTER — OFFICE VISIT (OUTPATIENT)
Dept: SURGERY | Age: 62
End: 2022-04-22
Payer: MEDICARE

## 2022-04-22 VITALS
HEART RATE: 75 BPM | TEMPERATURE: 97.2 F | WEIGHT: 229 LBS | SYSTOLIC BLOOD PRESSURE: 134 MMHG | DIASTOLIC BLOOD PRESSURE: 92 MMHG | HEIGHT: 69 IN | BODY MASS INDEX: 33.92 KG/M2

## 2022-04-22 DIAGNOSIS — K59.04 CHRONIC IDIOPATHIC CONSTIPATION: Primary | ICD-10-CM

## 2022-04-22 PROCEDURE — 99214 OFFICE O/P EST MOD 30 MIN: CPT | Performed by: SURGERY

## 2022-04-22 PROCEDURE — G8427 DOCREV CUR MEDS BY ELIG CLIN: HCPCS | Performed by: SURGERY

## 2022-04-22 PROCEDURE — G8417 CALC BMI ABV UP PARAM F/U: HCPCS | Performed by: SURGERY

## 2022-04-22 RX ORDER — METRONIDAZOLE 500 MG/1
500 TABLET ORAL 2 TIMES DAILY
Qty: 20 TABLET | Refills: 0 | Status: SHIPPED | OUTPATIENT
Start: 2022-04-22 | End: 2022-05-02

## 2022-04-22 RX ORDER — SODIUM CHLORIDE 9 MG/ML
INJECTION, SOLUTION INTRAVENOUS PRN
Status: CANCELLED | OUTPATIENT
Start: 2022-04-22

## 2022-04-22 RX ORDER — SODIUM CHLORIDE 0.9 % (FLUSH) 0.9 %
5-40 SYRINGE (ML) INJECTION PRN
Status: CANCELLED | OUTPATIENT
Start: 2022-04-22

## 2022-04-22 RX ORDER — SODIUM CHLORIDE 0.9 % (FLUSH) 0.9 %
5-40 SYRINGE (ML) INJECTION EVERY 12 HOURS SCHEDULED
Status: CANCELLED | OUTPATIENT
Start: 2022-04-22

## 2022-04-22 RX ORDER — SODIUM CHLORIDE 9 MG/ML
INJECTION, SOLUTION INTRAVENOUS CONTINUOUS
Status: CANCELLED | OUTPATIENT
Start: 2022-04-22

## 2022-04-22 RX ORDER — CIPROFLOXACIN 500 MG/1
500 TABLET, FILM COATED ORAL 2 TIMES DAILY
Qty: 20 TABLET | Refills: 0 | Status: SHIPPED | OUTPATIENT
Start: 2022-04-22 | End: 2022-05-02

## 2022-04-22 NOTE — PROGRESS NOTES
Marin Corbett  2022      [facility] Office visit    HISTORY OF PRESENT ILLNESS:  Marin Corbett is a 64 y.o. with one weeks of severe LLQ pain and constipation, says she was treated for diverticulitis in the past, no fever this time. Bowels have not moved for 6 days. History:  Long history of acid reflux on Omeprazole, was told she needed to stop the medication but can't due to the pain, EGD done 2021 showed no gastritis, no duodenitis, no esophagitis, 3 cm hiatal hernia.      irritable bowel diarrhea and constipation which causes rectal bleeding, getting worse recently, not on any fiber yet. Colonoscopy 2018 was normal. She also has a long history of acid reflux on Omeprazole. She was told she needed to stop the medication but can't due to the pain, EGD 2018 showed a 2 cm hiatal hernia and esophagitis. Past Medical History: She has a past medical history of Anxiety, Arthritis, Back pain, Breast cancer ([facility] [address] Cancer ([facility] [address] Chronic obstructive pulmonary disease (COPD) ([facility] [address] Depression, Diverticulosis, Hip pain, bilateral, History of therapeutic radiation, Hypertension, Moderate obesity, Osteoarthritis of right hip, and Tobacco abuse. Past Surgical History: She has a past surgical history that includes Colonoscopy (13); Upper gastrointestinal endoscopy (2018); Upper gastrointestinal endoscopy (N/A, 2018); pr colonoscopy flx dx w/collj spec when pfrmd (N/A, 2018); Induced  (age 16); Breast biopsy (Left, 10/18/2019); and Upper gastrointestinal endoscopy (N/A, 2021). Home Medications  Prior to Visit Medications    Medication Sig Taking?  Authorizing Provider   vitamin C (ASCORBIC ACID) 500 MG tablet Take 1,000 mg by mouth daily  Historical Provider, MD   cetirizine (ZYRTEC) 10 MG tablet Take 10 mg by mouth daily  Historical Provider, MD   anastrozole (ARIMIDEX) 1 MG tablet Take 1 tablet by mouth daily  Kay Ferrer, APRN - CNP   amLODIPine (NORVASC) 10 MG tablet Take 1 tablet by mouth daily  Historical Provider, MD   atorvastatin (LIPITOR) 20 MG tablet TAKE 1 TABLET BY MOUTH EVERY DAY  Historical Provider, MD   fluticasone (FLONASE) 50 MCG/ACT nasal spray USE 1 SPRAY IN EACH NOSTRIL EVERY DAY AS NEEDED  Historical Provider, MD   mupirocin (BACTROBAN) 2 % ointment APPLY TO THE AFFECTED AREA THREE TIMES DAILY  Patient not taking: Reported on 12/13/2021  Historical Provider, MD   venlafaxine (EFFEXOR XR) 75 MG extended release capsule TK ONE C PO  QAM  Historical Provider, MD   calcium carbonate-vitamin D (CALTRATE) 600-400 MG-UNIT TABS per tab Take 1 tablet by mouth 2 times daily   Historical Provider, MD   acetaminophen (TYLENOL) 500 MG tablet Take 500 mg by mouth every 6 hours as needed for Pain  Historical Provider, MD   Glucos-Chondroit-Hyaluron-MSM (GLUCOSAMINE CHONDROITIN JOINT PO) Take by mouth  Historical Provider, MD   HYDROXYZINE PAMOATE PO Take by mouth 2 times daily as needed   Historical Provider, MD   omeprazole (PRILOSEC) 20 MG delayed release capsule Take 1 capsule by mouth 2 times daily  Triny Hoffman MD   BREO ELLIPTA 100-25 MCG/INH AEPB inhaler Inhale 1 puff into the lungs daily   Historical Provider, MD   VENTOLIN  (90 Base) MCG/ACT inhaler Inhale 1 puff into the lungs every 4 hours as needed   Historical Provider, MD       Allergies: Latex, Cephalosporins, Other, and Penicillins   Social History:   TOBACCO:   reports that she has been smoking cigarettes. She has a 7.50 pack-year smoking history. She has never used smokeless tobacco.  All smokers should join the free smoking cessation program and stop smoking before having surgery. ETOH:    reports current alcohol use of about 0.8 standard drinks of alcohol per week.        Problem Relation Age of Onset    Diabetes Mother     High Blood Pressure Mother     Cancer Mother 70        pancreatic    Arthritis Father     High Blood Pressure Father     Heart Disease Father         anurism 52  Cancer Maternal Grandmother 79        breast       Review of Systems:  Psychiatric:  depression and anxiety  Respiratory: negative  Cardiovascular: negative  Gastrointestinal: negative  Musculoskeletal:negative  All others reviewed, negative    Physical Exam:   VITALS: Blood pressure (!) 134/92, pulse 75, temperature 97.2 °F (36.2 °C), height 5' 9\" (1.753 m), weight 229 lb (103.9 kg), last menstrual period 01/20/2014, not currently breastfeeding. General appearance: alert, appears stated age and cooperative, does not ambulate easily  Head: Normocephalic, without obvious abnormality, atraumatic  Eyes: PERRL  Ears/mouth/throat:  Ears clear, mouth normal, throat no redness  Neck: no adenopathy, no JVD, supple, symmetrical, trachea midline and thyroid not enlarged  Lungs: clear to auscultation bilaterally  Heart: regular rate and rhythm  Abdomen: soft, non-tender; bowel sounds normal; no masses,  no organomegaly  Extremities: extremities normal, atraumatic, no cyanosis or edema  Skin: no open wounds    ASSESSMENT:   LLQ pain and constipation. 3 cm hiatal hernia, no problems while on Omeprazole. PLAN:   Colonoscopy  Cipro and Flagyl for 10 days. Keep the bowels soft with Miralax. Signed: Dr. Jacqui Wright M.D.     Send copy of consult to PCP, Niko Hamilton MD

## 2022-04-22 NOTE — TELEPHONE ENCOUNTER
Per the order of Dr. Elba Shirley, patient has been scheduled for Colonoscopy on 22. Patient provided with Bowel Prep instructions sheet and reviewed in office. Patient instructed to please contact our office with any questions. Procedure scheduled through [employer] 's Google Calendar updated. Dr. Elba Shirley to enter orders. Prior Authorization Form:      DEMOGRAPHICS:                     Patient Name:  Rometta Pump  Patient :  1960            Insurance:  Payor: Kaity Valadez / Plan: Jomar Green COMPLETE / Product Type: *No Product type* /   Insurance ID Number:    Payor/Plan Subscr  Sex Relation Sub. Ins. ID Effective Group Num   1.  [facility] Joy Penn 1960 Female Self [id number]                                   [address]         DIAGNOSIS & PROCEDURE:                       Procedure/Operation: Colonoscopy       CPT Code: 90937    Diagnosis:  Chronic Idiopathic Constipation    ICD10 Code: K59.04    Location:  [address] [place] [address]    Surgeon:  Dr. Carmita Gilbert INFORMATION:                          Date: 22    Time: TBD              Anesthesia:  MAC/TIVA                                                       Status:  Outpatient        Special Comments:         Electronically signed by Jean Alexander MA on 2022 at 3:41 PM

## 2022-05-16 NOTE — TELEPHONE ENCOUNTER
Patient called requesting to cancel colonoscopy with Dr. Prakash Cantrell on 4/18/22 due to lack of transportation. She will call the office back when ready to reschedule. Surgery scheduling notified, surgeons calendar updated, follow up appointment cancelled.   Electronically signed by Reena Obrien RN on 5/16/2022 at 3:20 PM

## 2022-05-24 ENCOUNTER — TELEPHONE (OUTPATIENT)
Dept: BREAST CENTER | Age: 62
End: 2022-05-24

## 2022-05-24 NOTE — TELEPHONE ENCOUNTER
Patient's bone scan has been rescheduled. Test rescheduled for Wednesday, June 8. Arrival is 9 AM.  Patient to bring insurance card, ID, as well as a list of medications. Enter through [employer] entrance of the [facility].  Injection at 9:30 AM.  Scan at 12:30 PM.  Patient notified of all these details and verbalized her understanding.

## 2022-05-24 NOTE — TELEPHONE ENCOUNTER
RN attempt to contact patient to discuss getting NM Bone scan rescheduled that she missed. RN reached patient VM and left detailed message of why was calling and office number for patient to call office back with any questions and also radiology scheduling number to call and reschedule appointment.          Electronically signed by Latanya Mabry RN on 5/24/22 at 9:41 AM EDT

## 2022-06-08 ENCOUNTER — HOSPITAL ENCOUNTER (OUTPATIENT)
Dept: NUCLEAR MEDICINE | Age: 62
Discharge: HOME | End: 2022-06-10

## 2022-06-08 ENCOUNTER — HOSPITAL ENCOUNTER (OUTPATIENT)
Dept: NUCLEAR MEDICINE | Age: 62
Discharge: HOME | End: 2022-06-10
Payer: MEDICARE

## 2022-06-08 DIAGNOSIS — M54.50 BACK PAIN OF THORACOLUMBAR REGION: ICD-10-CM

## 2022-06-08 DIAGNOSIS — M54.6 BACK PAIN OF THORACOLUMBAR REGION: ICD-10-CM

## 2022-06-08 DIAGNOSIS — Z85.3 PERSONAL HISTORY OF BREAST CANCER: ICD-10-CM

## 2022-06-08 DIAGNOSIS — M25.551 PAIN IN RIGHT HIP: ICD-10-CM

## 2022-06-08 DIAGNOSIS — R74.8 ELEVATED ALKALINE PHOSPHATASE LEVEL: ICD-10-CM

## 2022-06-08 PROCEDURE — 78306 BONE IMAGING WHOLE BODY: CPT

## 2022-06-08 PROCEDURE — A9503 TC99M MEDRONATE: HCPCS | Performed by: RADIOLOGY

## 2022-06-08 PROCEDURE — 3430000000 HC RX DIAGNOSTIC RADIOPHARMACEUTICAL: Performed by: RADIOLOGY

## 2022-06-08 PROCEDURE — 78306 BONE IMAGING WHOLE BODY: CPT | Performed by: RADIOLOGY

## 2022-06-08 RX ORDER — TC 99M MEDRONATE 20 MG/10ML
25 INJECTION, POWDER, LYOPHILIZED, FOR SOLUTION INTRAVENOUS
Status: COMPLETED | OUTPATIENT
Start: 2022-06-08 | End: 2022-06-08

## 2022-06-08 RX ADMIN — TC 99M MEDRONATE 26 MILLICURIE: 20 INJECTION, POWDER, LYOPHILIZED, FOR SOLUTION INTRAVENOUS at 09:21

## 2022-06-10 ENCOUNTER — TELEPHONE (OUTPATIENT)
Dept: BREAST CENTER | Age: 62
End: 2022-06-10

## 2022-06-10 DIAGNOSIS — Z85.3 PERSONAL HISTORY OF BREAST CANCER: ICD-10-CM

## 2022-06-10 DIAGNOSIS — C50.912 INVASIVE DUCTAL CARCINOMA OF LEFT BREAST (HCC): Primary | ICD-10-CM

## 2022-06-10 NOTE — PROGRESS NOTES
Patient past due for her OV with Dr. Brigitte Mattson (due to return July 2021). Called patient to discuss. Patient states due to transportation issues she has trouble getting lab work. Explained that she can get lab work done here in office prior to appt. Patient agreed to schedule appt with Dr. Brigitte Mattson with lab work here at [facility] prior to appt. States that  she has continued to take her Arimidex. Appt for 6/21/2022.

## 2022-06-10 NOTE — TELEPHONE ENCOUNTER
Called Rnoit regarding Negative bone scan result. [employer] is having bone pain, may be related to her AI? Dr. Lissette Hollingsworth and team ordering. She has not been seen in Medical Oncology since 3/16/2021. She reports she has a difficult time getting the lab work drawn that is required for follow-up with Dr. Lissette Hollingsworth. She reports last time she was informed she had to go to [facility] for her blood work. Advised her I would follow-up with medical oncology and have them reach out to discuss further with her. Peterson Hernandez) Grayson Friend, RN, MSN, APRN-CNP, [address]  Advanced Oncology Certified Nurse Practitioner  Department of Breast Surgery  [facility]/  [facility] in collaboration with Dr. Nithin Wisdom.  Garcia/Dr. Issa Pulido/Dr. Chelsie Ness APRN-CNP

## 2022-07-07 ENCOUNTER — HOSPITAL ENCOUNTER (OUTPATIENT)
Age: 62
Discharge: HOME | End: 2022-07-09
Payer: MEDICARE

## 2022-07-07 ENCOUNTER — HOSPITAL ENCOUNTER (OUTPATIENT)
Dept: GENERAL RADIOLOGY | Age: 62
Discharge: HOME | End: 2022-07-09
Payer: MEDICARE

## 2022-07-07 DIAGNOSIS — J40 BRONCHITIS, NOT SPECIFIED AS ACUTE OR CHRONIC: ICD-10-CM

## 2022-07-07 PROCEDURE — 71046 X-RAY EXAM CHEST 2 VIEWS: CPT

## 2023-03-03 ENCOUNTER — TELEPHONE (OUTPATIENT)
Dept: SURGERY | Age: 63
End: 2023-03-03

## 2023-03-03 NOTE — TELEPHONE ENCOUNTER
Patient called to reschedule- She woke up sick and is dealing witht he passing of her friend. We rescheduled her for 3/24 for her consult.

## 2023-03-24 ENCOUNTER — INITIAL CONSULT (OUTPATIENT)
Dept: SURGERY | Age: 63
End: 2023-03-24

## 2023-03-24 ENCOUNTER — TELEPHONE (OUTPATIENT)
Dept: SURGERY | Age: 63
End: 2023-03-24

## 2023-03-24 VITALS
BODY MASS INDEX: 30.36 KG/M2 | SYSTOLIC BLOOD PRESSURE: 126 MMHG | WEIGHT: 205 LBS | OXYGEN SATURATION: 98 % | TEMPERATURE: 97.7 F | DIASTOLIC BLOOD PRESSURE: 72 MMHG | RESPIRATION RATE: 18 BRPM | HEART RATE: 70 BPM | HEIGHT: 69 IN

## 2023-03-24 DIAGNOSIS — K58.2 IRRITABLE BOWEL SYNDROME WITH BOTH CONSTIPATION AND DIARRHEA: Primary | ICD-10-CM

## 2023-03-24 RX ORDER — SODIUM CHLORIDE 0.9 % (FLUSH) 0.9 %
5-40 SYRINGE (ML) INJECTION PRN
OUTPATIENT
Start: 2023-03-24

## 2023-03-24 RX ORDER — SODIUM CHLORIDE 9 MG/ML
INJECTION, SOLUTION INTRAVENOUS PRN
OUTPATIENT
Start: 2023-03-24

## 2023-03-24 RX ORDER — SODIUM CHLORIDE 9 MG/ML
INJECTION, SOLUTION INTRAVENOUS CONTINUOUS
OUTPATIENT
Start: 2023-03-24

## 2023-03-24 RX ORDER — PANTOPRAZOLE SODIUM 40 MG/1
40 TABLET, DELAYED RELEASE ORAL DAILY
COMMUNITY
Start: 2023-02-24

## 2023-03-24 RX ORDER — SODIUM CHLORIDE 0.9 % (FLUSH) 0.9 %
5-40 SYRINGE (ML) INJECTION EVERY 12 HOURS SCHEDULED
OUTPATIENT
Start: 2023-03-24

## 2023-03-24 NOTE — PROGRESS NOTES
(ASCORBIC ACID) 500 MG tablet Take 1,000 mg by mouth daily Yes Historical Provider, MD   anastrozole (ARIMIDEX) 1 MG tablet Take 1 tablet by mouth daily Yes BRAD Schumacher - CNP   fluticasone (FLONASE) 50 MCG/ACT nasal spray USE 1 SPRAY IN EACH NOSTRIL EVERY DAY AS NEEDED Yes Historical Provider, MD   venlafaxine (EFFEXOR XR) 75 MG extended release capsule TK ONE C PO  QAM Yes Historical Provider, MD   calcium carbonate-vitamin D (CALTRATE) 600-400 MG-UNIT TABS per tab Take 1 tablet by mouth 2 times daily  Yes Historical Provider, MD   acetaminophen (TYLENOL) 500 MG tablet Take 500 mg by mouth every 6 hours as needed for Pain Yes Historical Provider, MD   Glucos-Chondroit-Hyaluron-MSM (GLUCOSAMINE CHONDROITIN JOINT PO) Take by mouth Yes Historical Provider, MD   HYDROXYZINE PAMOATE PO Take by mouth 2 times daily as needed  Yes Historical Provider, MD   BRESHAUN ELLIPTA 100-25 MCG/INH AEPB inhaler Inhale 1 puff into the lungs daily  Yes Historical Provider, MD   VENTOLIN  (90 Base) MCG/ACT inhaler Inhale 1 puff into the lungs every 4 hours as needed  Yes Historical Provider, MD   cetirizine (ZYRTEC) 10 MG tablet Take 10 mg by mouth daily  Patient not taking: Reported on 3/24/2023  Historical Provider, MD   amLODIPine (NORVASC) 10 MG tablet Take 1 tablet by mouth daily  Patient not taking: Reported on 3/24/2023  Historical Provider, MD   atorvastatin (LIPITOR) 20 MG tablet TAKE 1 TABLET BY MOUTH EVERY DAY  Patient not taking: Reported on 3/24/2023  Historical Provider, MD   mupirocin (BACTROBAN) 2 % ointment APPLY TO THE AFFECTED AREA THREE TIMES DAILY  Patient not taking: Reported on 3/24/2023  Historical Provider, MD   omeprazole (PRILOSEC) 20 MG delayed release capsule Take 1 capsule by mouth 2 times daily  Patient not taking: Reported on 3/24/2023  Zaida Payton MD       Allergies: Latex, Cephalosporins, Other, and Penicillins   Social History:   TOBACCO:   reports that she has been smoking

## 2023-03-24 NOTE — TELEPHONE ENCOUNTER
Per Dr. Rajendra Byrd, patient is scheduled for Colonoscopy possible biopsy or polypectomy  at [facility] on 23. Surgery scheduled via [facility], surgeon's calendar updated. Dr. Rajendra Byrd to enter orders. Follow up appointment scheduled. Miralax/gatorade prep instructions provided and discussed. Electronically signed by Jasmeet Herrera RN on 3/24/2023 at 10:15 AM    Prior Authorization Form:      DEMOGRAPHICS:                     Patient Name:  Beatrice Arredondo  Patient :  1960            Insurance:  Payor: Hussain Lindquist / Plan: Raymond Claude / Product Type: *No Product type* /   Insurance ID Number:    Payer/Plan Subscr  Sex Relation Sub. Ins. ID Effective Group Num   1. [facility] MEDICARE * OLIVA CORRAL 1960 Female Self [id number]                                   [address]   2.  [place] Yunier Angela 1960 Female Self [id number]                                    [address]         DIAGNOSIS & PROCEDURE:                       Procedure/Operation: Colonoscopy possible biopsy or polypectomy            CPT Code: 72159    Diagnosis:  IBS with constipation and diarrhea    ICD10 Code: K58.2    Location:  [facility]    Surgeon:  Carmencita Fleischer INFORMATION:                          Date: 23    Time: TBD              Anesthesia:  MAC/TIVA                                                       Status:  Outpatient        Special Comments:         Electronically signed by Jasmeet Herrera RN on 3/24/2023 at 10:15 AM

## 2023-04-17 ENCOUNTER — TELEPHONE (OUTPATIENT)
Dept: SURGERY | Age: 63
End: 2023-04-17

## 2023-04-17 NOTE — TELEPHONE ENCOUNTER
Patient did not follow bowel prep and is requesting to move her procedure. Colonoscopy rescheduled to 5/2/23. Surgery schedulers aware. Calendar updated.

## 2023-05-09 ENCOUNTER — TELEPHONE (OUTPATIENT)
Dept: BREAST CENTER | Age: 63
End: 2023-05-09

## 2023-05-09 NOTE — TELEPHONE ENCOUNTER
Patient cancelled her mammogram and office visit for 5/9/23 -- attempted to contact patient -unable to leave message voice mail was full.

## 2023-05-15 ENCOUNTER — TELEPHONE (OUTPATIENT)
Dept: BREAST CENTER | Age: 63
End: 2023-05-15

## 2023-05-15 DIAGNOSIS — C50.912 INVASIVE DUCTAL CARCINOMA OF LEFT BREAST (HCC): ICD-10-CM

## 2023-05-15 RX ORDER — ANASTROZOLE 1 MG/1
1 TABLET ORAL DAILY
Qty: 90 TABLET | Refills: 2 | Status: SHIPPED | OUTPATIENT
Start: 2023-05-15

## 2023-05-15 NOTE — TELEPHONE ENCOUNTER
RN received call from patient in regards to rescheduling canceled appointment D/T having bed bugs. Patient states she needs to reschedule mammogram and OV with NP. Patient rescheduled on 5/31/23 @ 12:30pm mammogram and 1:30pm OV. Patient also states she only has 8 Arimidex pills left and will need a refill. Patient states she hasnt been taking them daily that's why they have lasted so long. RN verbalized the importance of taking them daily and also keeping her upcoming appointment for mammogram and office visit. Patient verbalized understanding. Patient uses [employer] on [address]        RN advised message would be routed to NP.          Electronically signed by Pam Campos RN on 5/15/23 at 11:14 AM EDT

## 2023-05-15 NOTE — PROGRESS NOTES
Note from nursing requesting refill on Arimidex. Has a follow up apt scheduled. Lanna Seidel Belton Closs) Bryon Blizzard, RN, MSN, APRN-CNP, [address] [place]  Advanced Oncology Certified Nurse Practitioner  Department of Breast Surgery  [facility] Breast [facility]/  Care in collaboration with Dr. Jocelyn Dodge.  Garcia/Dr. Yassine Pulido/Dr. Keshia Mccarthy APRN-CNP

## 2023-05-16 ENCOUNTER — TELEPHONE (OUTPATIENT)
Dept: SURGERY | Age: 63
End: 2023-05-16

## 2023-05-16 NOTE — TELEPHONE ENCOUNTER
Patient previously scheduled for colonoscopy with Dr. Ahmet Lyle on 5/2/23. Appears that this procedure was cancelled due to the patient having hives. Follow up appointment with Dr. Ahmet Lyle on 5/19/23 cancelled. Patient to contact office when ready to reschedule.   Electronically signed by Frankie Arreola RN on 5/16/2023 at 2:57 PM

## 2023-05-17 ENCOUNTER — OFFICE VISIT (OUTPATIENT)
Dept: CARDIOLOGY CLINIC | Age: 63
End: 2023-05-17
Payer: MEDICARE

## 2023-05-17 VITALS
RESPIRATION RATE: 16 BRPM | HEIGHT: 69 IN | BODY MASS INDEX: 28.73 KG/M2 | OXYGEN SATURATION: 99 % | DIASTOLIC BLOOD PRESSURE: 68 MMHG | SYSTOLIC BLOOD PRESSURE: 120 MMHG | WEIGHT: 194 LBS | HEART RATE: 55 BPM

## 2023-05-17 DIAGNOSIS — I50.9 ACUTE DECOMPENSATED HEART FAILURE (HCC): ICD-10-CM

## 2023-05-17 DIAGNOSIS — E78.49 OTHER HYPERLIPIDEMIA: ICD-10-CM

## 2023-05-17 DIAGNOSIS — R06.09 DOE (DYSPNEA ON EXERTION): Primary | ICD-10-CM

## 2023-05-17 DIAGNOSIS — R93.89 ABNORMAL CT SCAN: ICD-10-CM

## 2023-05-17 DIAGNOSIS — I51.7 ATRIAL SEPTAL HYPERTROPHY: ICD-10-CM

## 2023-05-17 DIAGNOSIS — G47.33 OSA (OBSTRUCTIVE SLEEP APNEA): ICD-10-CM

## 2023-05-17 PROCEDURE — G8417 CALC BMI ABV UP PARAM F/U: HCPCS | Performed by: INTERNAL MEDICINE

## 2023-05-17 PROCEDURE — 99204 OFFICE O/P NEW MOD 45 MIN: CPT | Performed by: INTERNAL MEDICINE

## 2023-05-17 PROCEDURE — 3078F DIAST BP <80 MM HG: CPT | Performed by: INTERNAL MEDICINE

## 2023-05-17 PROCEDURE — 93000 ELECTROCARDIOGRAM COMPLETE: CPT | Performed by: INTERNAL MEDICINE

## 2023-05-17 PROCEDURE — G8427 DOCREV CUR MEDS BY ELIG CLIN: HCPCS | Performed by: INTERNAL MEDICINE

## 2023-05-17 PROCEDURE — 3074F SYST BP LT 130 MM HG: CPT | Performed by: INTERNAL MEDICINE

## 2023-05-17 PROCEDURE — 3017F COLORECTAL CA SCREEN DOC REV: CPT | Performed by: INTERNAL MEDICINE

## 2023-05-17 PROCEDURE — 4004F PT TOBACCO SCREEN RCVD TLK: CPT | Performed by: INTERNAL MEDICINE

## 2023-05-17 RX ORDER — FUROSEMIDE 20 MG/1
20 TABLET ORAL DAILY
Qty: 30 TABLET | Refills: 1 | Status: SHIPPED | OUTPATIENT
Start: 2023-05-17

## 2023-05-17 NOTE — PATIENT INSTRUCTIONS
Continue all your medications at current doses. Take lasix 20 mg daily for 4 days. Call me with an update. Check lipid panel, BNP, BMP. We will schedule an echocardiogram.   Home sleep study. Restrict sodium intake to less than 2-2.5 g/day. Restrict fluid intake to less than 2.2 L/day. Goal BP is less than 130/80. If your weight increases by > 2 lbs in a day or >5 lbs in more than a day, take an extra lasix pill. Please try to exercise for 150 minutes a week. Follow up in 6 months.

## 2023-05-17 NOTE — PROGRESS NOTES
and time. Well-developed and well-nourished. No distress. Head: Normocephalic and atraumatic. Eyes: EOM are normal. Pupils are equal, round, and reactive to light. Neck: Normal range of motion. Neck supple. JVP elevated at 12 cm. Carotid bruit is not present. No tracheal deviation present. No thyromegaly present. Cardiovascular: Normal rate, regular rhythm, normal heart sounds and intact distal pulses. Exam reveals no gallop and no friction rub. No murmur heard. Pulmonary/Chest: Effort normal and breath sounds normal. No respiratory distress. No wheezes. No rales. No tenderness. Abdominal: Soft. Bowel sounds are normal. No distension and no mass. No tenderness. No rebound and no guarding. Musculoskeletal: Normal range of motion. No edema and no tenderness. Lymphadenopathy:   No cervical adenopathy. Neurological: Alert and oriented to person, place, and time. Skin: Skin is warm and dry. No rash noted. Not diaphoretic. No erythema. Psychiatric: Normal mood and affect. Behavior is normal.     Procedures and Testing  EKG: Done in the office today independently reviewed by me. Shows sinus bradycardia at 55 bpm.  Low voltage precordial leads. ASSESSMENT:   Diagnosis Orders   1. ROBERTO (dyspnea on exertion)  ECHO Complete 2D W Doppler W Color      2. Abnormal CT scan  EKG 12 Lead      3. Acute decompensated heart failure (HCC)  Basic Metabolic Panel    Lipid Panel      4. Atrial septal hypertrophy        5. Other hyperlipidemia  Brain Natriuretic Peptide      6. VIVIAN (obstructive sleep apnea)  Home sleep study           Plan:  1. Dyspnea on exertion: Decompensated heart failure: Her dyspnea is secondary to deconditioning, COPD as well as decompensated heart failure. She does appear hypervolemic on examination. Initiate Lasix 20 mg daily. She will call me next week with a symptom update. Salt restriction as well as heart failure education provided.   I will check an echocardiogram to assess
Impaired Gait/Need for Mobility Assisted Device

## 2023-05-18 ASSESSMENT — ENCOUNTER SYMPTOMS
SHORTNESS OF BREATH: 0
COUGH: 0
BACK PAIN: 0

## 2023-05-25 DIAGNOSIS — Z12.31 ENCOUNTER FOR SCREENING MAMMOGRAM FOR BREAST CANCER: Primary | ICD-10-CM

## 2023-05-31 ENCOUNTER — TELEPHONE (OUTPATIENT)
Dept: BREAST CENTER | Age: 63
End: 2023-05-31

## 2023-05-31 NOTE — TELEPHONE ENCOUNTER
Left patient message with callback number. Reminded her of her arrival time today for her mammogram and office visit however would like to see if patient is able to come in sooner with approval from [facility] to have mammogram earlier.

## 2023-06-07 DIAGNOSIS — G47.33 OBSTRUCTIVE SLEEP APNEA (ADULT) (PEDIATRIC): Primary | ICD-10-CM

## 2023-06-21 ENCOUNTER — HOSPITAL ENCOUNTER (OUTPATIENT)
Dept: GENERAL RADIOLOGY | Age: 63
Discharge: HOME | End: 2023-06-23
Payer: MEDICARE

## 2023-06-21 ENCOUNTER — OFFICE VISIT (OUTPATIENT)
Dept: BREAST CENTER | Age: 63
End: 2023-06-21
Payer: MEDICARE

## 2023-06-21 VITALS
DIASTOLIC BLOOD PRESSURE: 72 MMHG | HEIGHT: 69 IN | SYSTOLIC BLOOD PRESSURE: 118 MMHG | HEART RATE: 70 BPM | BODY MASS INDEX: 29.33 KG/M2 | OXYGEN SATURATION: 98 % | RESPIRATION RATE: 14 BRPM | TEMPERATURE: 98.4 F | WEIGHT: 198 LBS

## 2023-06-21 VITALS — HEIGHT: 69 IN | WEIGHT: 194 LBS | BODY MASS INDEX: 28.73 KG/M2

## 2023-06-21 DIAGNOSIS — R92.2 DENSE BREAST TISSUE ON MAMMOGRAM: ICD-10-CM

## 2023-06-21 DIAGNOSIS — Z85.3 PERSONAL HISTORY OF BREAST CANCER: Primary | ICD-10-CM

## 2023-06-21 DIAGNOSIS — R92.2 DENSE BREAST TISSUE: ICD-10-CM

## 2023-06-21 DIAGNOSIS — Z79.811 USE OF ANASTROZOLE: ICD-10-CM

## 2023-06-21 DIAGNOSIS — Z12.31 ENCOUNTER FOR SCREENING MAMMOGRAM FOR BREAST CANCER: ICD-10-CM

## 2023-06-21 DIAGNOSIS — Z85.3 PERSONAL HISTORY OF BREAST CANCER: ICD-10-CM

## 2023-06-21 LAB
ALBUMIN SERPL-MCNC: 4.1 G/DL (ref 3.5–5.2)
ALP SERPL-CCNC: 129 U/L (ref 35–104)
ALT SERPL-CCNC: 13 U/L (ref 0–32)
ANION GAP SERPL CALCULATED.3IONS-SCNC: 11 MMOL/L (ref 7–16)
AST SERPL-CCNC: 14 U/L (ref 0–31)
BASOPHILS # BLD: 0.06 E9/L (ref 0–0.2)
BASOPHILS NFR BLD: 0.8 % (ref 0–2)
BILIRUB SERPL-MCNC: 0.4 MG/DL (ref 0–1.2)
BUN SERPL-MCNC: 17 MG/DL (ref 6–23)
CALCIUM SERPL-MCNC: 9.1 MG/DL (ref 8.6–10.2)
CHLORIDE SERPL-SCNC: 106 MMOL/L (ref 98–107)
CO2 SERPL-SCNC: 24 MMOL/L (ref 22–29)
CREAT SERPL-MCNC: 0.8 MG/DL (ref 0.5–1)
EOSINOPHIL # BLD: 0.06 E9/L (ref 0.05–0.5)
EOSINOPHIL NFR BLD: 0.8 % (ref 0–6)
ERYTHROCYTE [DISTWIDTH] IN BLOOD BY AUTOMATED COUNT: 13.1 FL (ref 11.5–15)
GLUCOSE SERPL-MCNC: 94 MG/DL (ref 74–99)
HCT VFR BLD AUTO: 44.9 % (ref 34–48)
HGB BLD-MCNC: 14.5 G/DL (ref 11.5–15.5)
IMM GRANULOCYTES # BLD: 0.03 E9/L
IMM GRANULOCYTES NFR BLD: 0.4 % (ref 0–5)
LYMPHOCYTES # BLD: 1.9 E9/L (ref 1.5–4)
LYMPHOCYTES NFR BLD: 23.8 % (ref 20–42)
MCH RBC QN AUTO: 31.2 PG (ref 26–35)
MCHC RBC AUTO-ENTMCNC: 32.3 % (ref 32–34.5)
MCV RBC AUTO: 96.6 FL (ref 80–99.9)
MONOCYTES # BLD: 0.76 E9/L (ref 0.1–0.95)
MONOCYTES NFR BLD: 9.5 % (ref 2–12)
NEUTROPHILS # BLD: 5.17 E9/L (ref 1.8–7.3)
NEUTS SEG NFR BLD: 64.7 % (ref 43–80)
PLATELET # BLD AUTO: 328 E9/L (ref 130–450)
PMV BLD AUTO: 10.1 FL (ref 7–12)
POTASSIUM SERPL-SCNC: 4.4 MMOL/L (ref 3.5–5)
PROT SERPL-MCNC: 6.9 G/DL (ref 6.4–8.3)
RBC # BLD AUTO: 4.65 E12/L (ref 3.5–5.5)
SODIUM SERPL-SCNC: 141 MMOL/L (ref 132–146)
WBC # BLD: 8 E9/L (ref 4.5–11.5)

## 2023-06-21 PROCEDURE — 77063 BREAST TOMOSYNTHESIS BI: CPT

## 2023-06-21 PROCEDURE — G8427 DOCREV CUR MEDS BY ELIG CLIN: HCPCS | Performed by: NURSE PRACTITIONER

## 2023-06-21 PROCEDURE — G8417 CALC BMI ABV UP PARAM F/U: HCPCS | Performed by: NURSE PRACTITIONER

## 2023-06-21 PROCEDURE — 3078F DIAST BP <80 MM HG: CPT | Performed by: NURSE PRACTITIONER

## 2023-06-21 PROCEDURE — 99213 OFFICE O/P EST LOW 20 MIN: CPT | Performed by: NURSE PRACTITIONER

## 2023-06-21 PROCEDURE — 36415 COLL VENOUS BLD VENIPUNCTURE: CPT | Performed by: NURSE PRACTITIONER

## 2023-06-21 PROCEDURE — 4004F PT TOBACCO SCREEN RCVD TLK: CPT | Performed by: NURSE PRACTITIONER

## 2023-06-21 PROCEDURE — 3017F COLORECTAL CA SCREEN DOC REV: CPT | Performed by: NURSE PRACTITIONER

## 2023-06-21 PROCEDURE — 3074F SYST BP LT 130 MM HG: CPT | Performed by: NURSE PRACTITIONER

## 2023-06-21 RX ORDER — VENLAFAXINE HYDROCHLORIDE 150 MG/1
150 CAPSULE, EXTENDED RELEASE ORAL DAILY
COMMUNITY
Start: 2023-05-27

## 2023-06-21 RX ORDER — HYDROXYZINE PAMOATE 50 MG/1
50 CAPSULE ORAL DAILY PRN
COMMUNITY
Start: 2023-05-27

## 2023-06-21 ASSESSMENT — ENCOUNTER SYMPTOMS
ABDOMINAL PAIN: 0
ABDOMINAL DISTENTION: 0
FACIAL SWELLING: 0
RHINORRHEA: 0

## 2023-08-05 ENCOUNTER — HOSPITAL ENCOUNTER (OUTPATIENT)
Dept: GENERAL RADIOLOGY | Age: 63
End: 2023-08-05
Payer: MEDICARE

## 2023-08-05 ENCOUNTER — HOSPITAL ENCOUNTER (OUTPATIENT)
Age: 63
Discharge: HOME | End: 2023-08-05
Payer: MEDICARE

## 2023-08-05 ENCOUNTER — HOSPITAL ENCOUNTER (OUTPATIENT)
Age: 63
End: 2023-08-05
Payer: MEDICARE

## 2023-08-05 DIAGNOSIS — I50.9 ACUTE DECOMPENSATED HEART FAILURE (HCC): ICD-10-CM

## 2023-08-05 DIAGNOSIS — R52 PAIN: ICD-10-CM

## 2023-08-05 DIAGNOSIS — E78.49 OTHER HYPERLIPIDEMIA: ICD-10-CM

## 2023-08-05 LAB
ANION GAP SERPL CALCULATED.3IONS-SCNC: 10 MMOL/L (ref 7–16)
BNP SERPL-MCNC: 231 PG/ML (ref 0–450)
BUN SERPL-MCNC: 13 MG/DL (ref 6–23)
CALCIUM SERPL-MCNC: 9.4 MG/DL (ref 8.6–10.2)
CHLORIDE SERPL-SCNC: 106 MMOL/L (ref 98–107)
CHOLEST SERPL-MCNC: 155 MG/DL
CO2 SERPL-SCNC: 25 MMOL/L (ref 22–29)
CREAT SERPL-MCNC: 0.7 MG/DL (ref 0.5–1)
GFR SERPL CREATININE-BSD FRML MDRD: >60 ML/MIN/1.73M2
GLUCOSE SERPL-MCNC: 153 MG/DL (ref 74–99)
HDLC SERPL-MCNC: 75 MG/DL
LDLC SERPL CALC-MCNC: 62 MG/DL
POTASSIUM SERPL-SCNC: 4 MMOL/L (ref 3.5–5)
SODIUM SERPL-SCNC: 141 MMOL/L (ref 132–146)
TRIGL SERPL-MCNC: 88 MG/DL
VLDLC SERPL CALC-MCNC: 18 MG/DL

## 2023-08-05 PROCEDURE — 80048 BASIC METABOLIC PNL TOTAL CA: CPT

## 2023-08-05 PROCEDURE — 83880 ASSAY OF NATRIURETIC PEPTIDE: CPT

## 2023-08-05 PROCEDURE — 36415 COLL VENOUS BLD VENIPUNCTURE: CPT

## 2023-08-05 PROCEDURE — 73130 X-RAY EXAM OF HAND: CPT

## 2023-08-05 PROCEDURE — 80061 LIPID PANEL: CPT

## 2023-09-05 ENCOUNTER — HOSPITAL ENCOUNTER (OUTPATIENT)
Dept: SLEEP CENTER | Age: 63
Discharge: HOME | End: 2023-09-05
Payer: MEDICARE

## 2023-09-05 DIAGNOSIS — G47.33 OBSTRUCTIVE SLEEP APNEA (ADULT) (PEDIATRIC): ICD-10-CM

## 2023-09-05 PROCEDURE — 95800 SLP STDY UNATTENDED: CPT

## 2023-09-22 ENCOUNTER — TELEPHONE (OUTPATIENT)
Dept: SLEEP CENTER | Age: 63
End: 2023-09-22

## 2023-09-22 NOTE — TELEPHONE ENCOUNTER
----- Message from Dona Ocampo, DO sent at 9/22/2023 11:14 AM EDT -----  Could you please make pt appt to review ss results w any avail provider

## 2023-09-22 NOTE — TELEPHONE ENCOUNTER
Attempted to contact patient to schedule a new patient appt to review her SS results but her phone says that the number is not accepting calls at this time.  Will try again

## 2023-09-25 NOTE — TELEPHONE ENCOUNTER
2nd attempt to contact patient to set up NP appointment to go over he SS results but phone says not accepting calls at this time. Will try again later.

## 2023-10-02 RX ORDER — FUROSEMIDE 20 MG/1
20 TABLET ORAL DAILY
Qty: 30 TABLET | Refills: 3 | Status: SHIPPED | OUTPATIENT
Start: 2023-10-02

## 2023-10-10 ENCOUNTER — OFFICE VISIT (OUTPATIENT)
Dept: SLEEP CENTER | Age: 63
End: 2023-10-10
Payer: MEDICARE

## 2023-10-10 VITALS
DIASTOLIC BLOOD PRESSURE: 73 MMHG | WEIGHT: 209.44 LBS | HEART RATE: 68 BPM | BODY MASS INDEX: 31.02 KG/M2 | SYSTOLIC BLOOD PRESSURE: 128 MMHG | RESPIRATION RATE: 18 BRPM | HEIGHT: 69 IN | OXYGEN SATURATION: 96 %

## 2023-10-10 DIAGNOSIS — G47.33 OSA (OBSTRUCTIVE SLEEP APNEA): Primary | ICD-10-CM

## 2023-10-10 DIAGNOSIS — G47.10 HYPERSOMNOLENCE: ICD-10-CM

## 2023-10-10 PROCEDURE — 3074F SYST BP LT 130 MM HG: CPT | Performed by: INTERNAL MEDICINE

## 2023-10-10 PROCEDURE — G8427 DOCREV CUR MEDS BY ELIG CLIN: HCPCS | Performed by: INTERNAL MEDICINE

## 2023-10-10 PROCEDURE — G8484 FLU IMMUNIZE NO ADMIN: HCPCS | Performed by: INTERNAL MEDICINE

## 2023-10-10 PROCEDURE — 3078F DIAST BP <80 MM HG: CPT | Performed by: INTERNAL MEDICINE

## 2023-10-10 PROCEDURE — 4004F PT TOBACCO SCREEN RCVD TLK: CPT | Performed by: INTERNAL MEDICINE

## 2023-10-10 PROCEDURE — 3017F COLORECTAL CA SCREEN DOC REV: CPT | Performed by: INTERNAL MEDICINE

## 2023-10-10 PROCEDURE — 99204 OFFICE O/P NEW MOD 45 MIN: CPT | Performed by: INTERNAL MEDICINE

## 2023-10-10 PROCEDURE — G8417 CALC BMI ABV UP PARAM F/U: HCPCS | Performed by: INTERNAL MEDICINE

## 2023-10-10 ASSESSMENT — ENCOUNTER SYMPTOMS
EYES NEGATIVE: 1
ALLERGIC/IMMUNOLOGIC NEGATIVE: 1
GASTROINTESTINAL NEGATIVE: 1
RESPIRATORY NEGATIVE: 1

## 2023-10-10 ASSESSMENT — SLEEP AND FATIGUE QUESTIONNAIRES
ESS TOTAL SCORE: 6
HOW LIKELY ARE YOU TO NOD OFF OR FALL ASLEEP WHILE WATCHING TV: 2
HOW LIKELY ARE YOU TO NOD OFF OR FALL ASLEEP WHILE SITTING AND TALKING TO SOMEONE: 0
HOW LIKELY ARE YOU TO NOD OFF OR FALL ASLEEP WHILE LYING DOWN TO REST IN THE AFTERNOON WHEN CIRCUMSTANCES PERMIT: 3
HOW LIKELY ARE YOU TO NOD OFF OR FALL ASLEEP WHILE SITTING INACTIVE IN A PUBLIC PLACE: 0
HOW LIKELY ARE YOU TO NOD OFF OR FALL ASLEEP WHILE SITTING AND READING: 0
HOW LIKELY ARE YOU TO NOD OFF OR FALL ASLEEP IN A CAR, WHILE STOPPED FOR A FEW MINUTES IN TRAFFIC: 0
HOW LIKELY ARE YOU TO NOD OFF OR FALL ASLEEP WHILE SITTING QUIETLY AFTER LUNCH WITHOUT ALCOHOL: 1
HOW LIKELY ARE YOU TO NOD OFF OR FALL ASLEEP WHEN YOU ARE A PASSENGER IN A CAR FOR AN HOUR WITHOUT A BREAK: 0

## 2023-10-10 NOTE — PROGRESS NOTES
[facility] Sleep Medicine    Patient Name: Eran Fisher  Age: 58 y.o.   : 1960    Date of Visit: 10/10/23        HPI   Eran Fisher is a 58 y.o. female with  has a past medical history of Anxiety, Arthritis, Back pain, Breast cancer ([address]), Cancer ([address]) (2019), Chronic obstructive pulmonary disease (COPD) ([address]) (2017), Depression, Diverticulosis, Hip pain, bilateral, History of therapeutic radiation, Hypertension, Irritable bowel syndrome with both constipation and diarrhea (3/24/2023), Moderate obesity (2017), Osteoarthritis of right hip (2012), and Tobacco abuse. who presents as a new patient to Sleep Clinic, referred by Dr. Ge ref. provider found, for Sleep Apnea (Sleep study results 23)  .               10/10/2023    11:16 AM   Sleep Medicine   Sitting and reading 0   Watching TV 2   Sitting, inactive in a public place (e.g. a theatre or a meeting) 0   As a passenger in a car for an hour without a break 0   Lying down to rest in the afternoon when circumstances permit 3   Sitting and talking to someone 0   Sitting quietly after a lunch without alcohol 1   In a car, while stopped for a few minutes in traffic 0   [place] Sleepiness Score 6          Bed time: ~ mn  Arousals during the night: rare  Difficulty returning to sleep:no  Wake time:9-10a  Wakes non refreshed: yes  Morning headaches:yes  Wakes with dry mouth:yes  Naps:yes every day 1.5 h  Falling asleep inappropriately/car accidents due to sleepiness:no  Caffeine:2-3 c coffee  Nicotine: 0.5-0.75 ppd  EtOH: 2-3/wk  Sleep aids: no  RLS: no  RBD: no  Hypnagogic/hypnopompic hallucinations: no  Sleep paralysis: no  Sleep walking/talking/eating: no        PMH:  Past Medical History:   Diagnosis Date    Anxiety     Arthritis     Back pain     Breast cancer ([address])     lt breast    Cancer ([address]) 2019    breast    Chronic obstructive pulmonary disease (COPD) ([address]) 2017    Depression     Diverticulosis     Hip pain, bilateral

## 2023-12-11 RX ORDER — ANASTROZOLE 1 MG/1
1 TABLET ORAL DAILY
Qty: 90 TABLET | Refills: 1 | Status: SHIPPED | OUTPATIENT
Start: 2023-12-11

## 2023-12-11 NOTE — PROGRESS NOTES
Prescription compliance for Arimidex 1 mg by mouth once daily was verified via [facility]. Last refill was 11/24/2023 for 30 tabs. Refill sent today for Arimidex 1 mg by mouth once daily dispense 90 tabs with 1 refill to [facility]. Remy Agee, RN, MSN, APRN-CNP, [address].  Advanced Oncology Certified Nurse Practitioner  Department of Breast Surgery  [facility] Breast [facility]/  Care in collaboration with Dr. Marito Holly.  Garcia/Dr. Fletcher [facility]/Dr. Dottie Álvarez APRN-CNP

## 2024-01-30 ENCOUNTER — TELEPHONE (OUTPATIENT)
Dept: SLEEP CENTER | Age: 64
End: 2024-01-30

## 2024-01-30 NOTE — TELEPHONE ENCOUNTER
Patient called in stating she has an appt for compliance on 02/06/24 but she has not been able to use her machine properly as the mask is breaking her out and making her skin peel (she said she is very sensitive). She wants to know what she should do as she is not able to use it and is wondering if she needs a different mask and what she should do about her appt. Please advise

## 2024-02-06 ENCOUNTER — OFFICE VISIT (OUTPATIENT)
Dept: SLEEP CENTER | Age: 64
End: 2024-02-06
Payer: MEDICARE

## 2024-02-06 VITALS
SYSTOLIC BLOOD PRESSURE: 134 MMHG | DIASTOLIC BLOOD PRESSURE: 75 MMHG | BODY MASS INDEX: 31.41 KG/M2 | HEART RATE: 83 BPM | WEIGHT: 212.08 LBS | OXYGEN SATURATION: 99 % | RESPIRATION RATE: 18 BRPM | HEIGHT: 69 IN

## 2024-02-06 DIAGNOSIS — G47.33 OSA (OBSTRUCTIVE SLEEP APNEA): Primary | ICD-10-CM

## 2024-02-06 DIAGNOSIS — G47.10 HYPERSOMNOLENCE: ICD-10-CM

## 2024-02-06 PROCEDURE — G8428 CUR MEDS NOT DOCUMENT: HCPCS | Performed by: INTERNAL MEDICINE

## 2024-02-06 PROCEDURE — G8417 CALC BMI ABV UP PARAM F/U: HCPCS | Performed by: INTERNAL MEDICINE

## 2024-02-06 PROCEDURE — 3078F DIAST BP <80 MM HG: CPT | Performed by: INTERNAL MEDICINE

## 2024-02-06 PROCEDURE — 4004F PT TOBACCO SCREEN RCVD TLK: CPT | Performed by: INTERNAL MEDICINE

## 2024-02-06 PROCEDURE — 3075F SYST BP GE 130 - 139MM HG: CPT | Performed by: INTERNAL MEDICINE

## 2024-02-06 PROCEDURE — 3017F COLORECTAL CA SCREEN DOC REV: CPT | Performed by: INTERNAL MEDICINE

## 2024-02-06 PROCEDURE — G8484 FLU IMMUNIZE NO ADMIN: HCPCS | Performed by: INTERNAL MEDICINE

## 2024-02-06 PROCEDURE — 99214 OFFICE O/P EST MOD 30 MIN: CPT | Performed by: INTERNAL MEDICINE

## 2024-02-06 ASSESSMENT — SLEEP AND FATIGUE QUESTIONNAIRES
HOW LIKELY ARE YOU TO NOD OFF OR FALL ASLEEP WHILE SITTING QUIETLY AFTER LUNCH WITHOUT ALCOHOL: 2
ESS TOTAL SCORE: 8
HOW LIKELY ARE YOU TO NOD OFF OR FALL ASLEEP WHILE LYING DOWN TO REST IN THE AFTERNOON WHEN CIRCUMSTANCES PERMIT: 3
HOW LIKELY ARE YOU TO NOD OFF OR FALL ASLEEP WHEN YOU ARE A PASSENGER IN A CAR FOR AN HOUR WITHOUT A BREAK: 1
HOW LIKELY ARE YOU TO NOD OFF OR FALL ASLEEP WHILE WATCHING TV: 2
HOW LIKELY ARE YOU TO NOD OFF OR FALL ASLEEP WHILE SITTING AND TALKING TO SOMEONE: 0
HOW LIKELY ARE YOU TO NOD OFF OR FALL ASLEEP WHILE SITTING AND READING: 0
HOW LIKELY ARE YOU TO NOD OFF OR FALL ASLEEP WHILE SITTING INACTIVE IN A PUBLIC PLACE: 0
HOW LIKELY ARE YOU TO NOD OFF OR FALL ASLEEP IN A CAR, WHILE STOPPED FOR A FEW MINUTES IN TRAFFIC: 0

## 2024-02-06 NOTE — PROGRESS NOTES
[facility] Sleep Medicine    Patient Name: Ronit Calero  Age: 63 y.o.   : 1960    Date of Visit: 10/10/23        HPI   Ronit Calero is a 63 y.o. female with  has a past medical history of Anxiety, Arthritis, Back pain, Breast cancer (HCC), Cancer (HCC) (), Chronic obstructive pulmonary disease (COPD) (HCC) (2017), Depression, Diverticulosis, Hip pain, bilateral, History of therapeutic radiation, Hypertension, Irritable bowel syndrome with both constipation and diarrhea (3/24/2023), Moderate obesity (2017), Osteoarthritis of right hip (2012), and Tobacco abuse.       F/u VIVIAN  Struggling with CPAP use due to issues with mask/skin reaction  Going to meet with RT at [facility] tomorrow for mask fitting  Has not been able to use the CPAP consistently due to mask issues but would like to keep trying                  2024     1:13 PM 10/10/2023    11:16 AM   Sleep Medicine   Sitting and reading 0 0   Watching TV 2 2   Sitting, inactive in a public place (e.g. a theatre or a meeting) 0 0   As a passenger in a car for an hour without a break 1 0   Lying down to rest in the afternoon when circumstances permit 3 3   Sitting and talking to someone 0 0   Sitting quietly after a lunch without alcohol 2 1   In a car, while stopped for a few minutes in traffic 0 0   [place] Sleepiness Score 8 6          Bed time: ~ mn  Arousals during the night: rare  Difficulty returning to sleep:no  Wake time:9-10a  Wakes non refreshed: yes  Morning headaches:yes  Wakes with dry mouth:yes  Naps:yes every day 1.5 h  Falling asleep inappropriately/car accidents due to sleepiness:no  Caffeine:2-3 c coffee  Nicotine: 0.5-0.75 ppd  EtOH: 2-3/wk  Sleep aids: no  RLS: no  RBD: no  Hypnagogic/hypnopompic hallucinations: no  Sleep paralysis: no  Sleep walking/talking/eating: no        PMH:  Past Medical History:   Diagnosis Date    Anxiety     Arthritis     Back pain     Breast cancer (HCC)     lt breast    Cancer

## 2024-02-15 RX ORDER — FUROSEMIDE 20 MG/1
TABLET ORAL
Qty: 30 TABLET | Refills: 0 | Status: SHIPPED | OUTPATIENT
Start: 2024-02-15

## 2024-03-20 RX ORDER — FUROSEMIDE 20 MG/1
TABLET ORAL
Qty: 30 TABLET | Refills: 3 | Status: SHIPPED | OUTPATIENT
Start: 2024-03-20

## 2024-04-09 ENCOUNTER — TELEPHONE (OUTPATIENT)
Dept: CARDIOLOGY | Age: 64
End: 2024-04-09

## 2024-04-09 NOTE — TELEPHONE ENCOUNTER
Echo Test not completed order . Does patient still need testing completed? If so we need new order please and thank you.  We are scheduling echo's out in     Electronically signed by Veena Crabtree on 2024 at 9:04 AM

## 2024-04-10 RX ORDER — FUROSEMIDE 20 MG/1
TABLET ORAL
Qty: 30 TABLET | Refills: 3 | Status: SHIPPED | OUTPATIENT
Start: 2024-04-10

## 2024-06-12 RX ORDER — ANASTROZOLE 1 MG/1
1 TABLET ORAL DAILY
Qty: 90 TABLET | Refills: 1 | Status: SHIPPED | OUTPATIENT
Start: 2024-06-12

## 2024-06-25 DIAGNOSIS — Z85.3 PERSONAL HISTORY OF BREAST CANCER: ICD-10-CM

## 2024-06-25 DIAGNOSIS — Z79.811 USE OF ANASTROZOLE: Primary | ICD-10-CM

## 2024-06-26 ENCOUNTER — TELEPHONE (OUTPATIENT)
Dept: BREAST CENTER | Age: 64
End: 2024-06-26
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
## 2024-06-26 NOTE — TELEPHONE ENCOUNTER
RN received return call from  and since the  are involved nothing further needed for us to do. Francie Moss stated the only other thing could have offered was if she wanted a  to call could have gave number. RN advised she would call and offer that to patient but patient is out of minutes on her cell until 06/30/2024 she was borrowing a phone to call us. Patient could have also called [phone] which is a safety number. RN verbalized understanding and advised that if patient calls prior to appointment can offer  her to receive a call if wants.     Francie Moss can be reached at [phone] she will be out of town from 6/28-7/8 Booker will be covering and can be given [phone].    Ericka is a behavioral Health counselor and can be reached at [phone] and is off on Mondays.       Electronically signed by Sugar Epps RN on 6/26/24 at 1:27 PM EDT

## 2024-08-09 NOTE — PROGRESS NOTES
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  -ET:  Arimidex 1 mg po daily was started on 01/14/2020    This note was copied forward from the last encounter.  Essential components for this patient record were reviewed and verified on this visit including:  recent hospitalizations, recent imaging, PMH, PSH, FH, SOC HX, Allergies, and Medications were reviewed and updated as appropriate.  In addition, the assessment and plan were copied from prior office note and updated accordingly.       Patient ID: Ronit Calero is a 63 y.o. female. She presents today for a 6 mos clinical follow up    HPI Ronit is a 63 y.o. who was noted to have a left breast mass on screening mammogram in May 2019.  The mass was located in the 2 o'clock position of left breast. Breast cancer risk factors include age, gender, menopause after 50, family history of breast and pancreatic cancer. [employer] Ancestry: No       05/31/2019 left Breast US: there is an irregular solid mass with angular margins measuring 8 x 6 x 7 mm in the left breast at 2 o'clock located 8 centimeters from the nipple. Internal echogenicity is hypoechoic. There is no axillary lymphadenopathy.     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    Comment: The invasive ductal carcinoma is Sam grade 1 (score of 4): glandular differentiation score 1, nuclear pleomorphism score 2, mitotic activity score 1. The DCIS is cribriform type with microcalcifications, lacking necrosis.  Immunostain for p63 shows negative staining in the invasive carcinoma.    Breast Cancer Marker Studies:  ER:Positive:100%  LA:Positive:70%  Her-2/leslie (c-erb B-2) protein expression:  
recommend, based on her breast tissue density score (grade C) that she consider  bilateral complete breast screening ultrasounds annually.      08/14/2024 clinical follow up: Clinical follow-up is without secondary evidence of recurrent disease.  We reviewed her family history and there are no pertinent updates to provide on this visit.  She continues to tolerate endocrine therapy relatively well overall.  She has chronic arthralgias but no distinct bony pain.  She follows closely with primary care.  She does admit to medical marijuana use for her arthralgias.  She is approaching the 5-year david of endocrine therapy with Arimidex however, she admits to intermittent compliance which she reports cumulatively is approximately 2 months.  We will plan to see her in March for repeat clinical exam and evaluation of her endocrine therapy at that time.  She does complain of intermittent diarrhea.  She complains of diarrhea; last screening colonoscopy per Dr. Anne in 2018 revealed diverticulosis per her understanding.  Would like to go back to see Dr. Anne-referral placed..  Labs per primary care-obtain copy..      06/18/2019 [employer] Genetic testing: No clinically significant mutation identified.  VUS in CHEK2 and RAD51D   Plan:    Continue monthly breast/chest wall self examination; detailed instructions reviewed today. Bring any changes to your physician's attention.  Continue healthy diet and exercise routinely as tolerated.    Maintaining ideal body weight (20-25 BMI) may lead to optimal breast cancer outcomes.  Avoid alcohol.    Continue Arimidex 1 mg daily at the discretion of medical oncology team.  Ca+/VitD while on Arimidex  Continue follow up with Medical Oncology, Primary Care, and all specialties as directed  Please obtain copy of her most recent labs per primary care.  If mammogram negative/benign, RTC March 2025 and PRN.  Refer to Dr. Anne per her request.      I spent a total 27 minutes on the

## 2024-08-12 RX ORDER — FUROSEMIDE 20 MG/1
TABLET ORAL
Qty: 30 TABLET | Refills: 0 | Status: SHIPPED | OUTPATIENT
Start: 2024-08-12

## 2024-08-14 ENCOUNTER — HOSPITAL ENCOUNTER (OUTPATIENT)
Dept: GENERAL RADIOLOGY | Age: 64
Discharge: HOME | End: 2024-08-16
Payer: MEDICARE

## 2024-08-14 ENCOUNTER — OFFICE VISIT (OUTPATIENT)
Dept: BREAST CENTER | Age: 64
End: 2024-08-14
Payer: MEDICARE

## 2024-08-14 ENCOUNTER — TELEPHONE (OUTPATIENT)
Dept: BREAST CENTER | Age: 64
End: 2024-08-14

## 2024-08-14 VITALS
BODY MASS INDEX: 29.47 KG/M2 | DIASTOLIC BLOOD PRESSURE: 80 MMHG | RESPIRATION RATE: 16 BRPM | SYSTOLIC BLOOD PRESSURE: 120 MMHG | WEIGHT: 199 LBS | OXYGEN SATURATION: 100 % | TEMPERATURE: 97.9 F | HEIGHT: 69 IN | HEART RATE: 65 BPM

## 2024-08-14 VITALS — WEIGHT: 205 LBS | BODY MASS INDEX: 30.36 KG/M2 | HEIGHT: 69 IN

## 2024-08-14 DIAGNOSIS — Z12.31 ENCOUNTER FOR SCREENING MAMMOGRAM FOR MALIGNANT NEOPLASM OF BREAST: ICD-10-CM

## 2024-08-14 DIAGNOSIS — Z12.11 ENCOUNTER FOR SCREENING COLONOSCOPY: ICD-10-CM

## 2024-08-14 DIAGNOSIS — Z85.3 PERSONAL HISTORY OF BREAST CANCER: Primary | ICD-10-CM

## 2024-08-14 DIAGNOSIS — R19.7 DIARRHEA, UNSPECIFIED TYPE: ICD-10-CM

## 2024-08-14 DIAGNOSIS — Z85.3 PERSONAL HISTORY OF BREAST CANCER: ICD-10-CM

## 2024-08-14 DIAGNOSIS — Z79.811 USE OF ANASTROZOLE: ICD-10-CM

## 2024-08-14 PROCEDURE — 3079F DIAST BP 80-89 MM HG: CPT | Performed by: NURSE PRACTITIONER

## 2024-08-14 PROCEDURE — 77080 DXA BONE DENSITY AXIAL: CPT

## 2024-08-14 PROCEDURE — 99213 OFFICE O/P EST LOW 20 MIN: CPT | Performed by: NURSE PRACTITIONER

## 2024-08-14 PROCEDURE — G8427 DOCREV CUR MEDS BY ELIG CLIN: HCPCS | Performed by: NURSE PRACTITIONER

## 2024-08-14 PROCEDURE — 77063 BREAST TOMOSYNTHESIS BI: CPT

## 2024-08-14 PROCEDURE — G8417 CALC BMI ABV UP PARAM F/U: HCPCS | Performed by: NURSE PRACTITIONER

## 2024-08-14 PROCEDURE — 4004F PT TOBACCO SCREEN RCVD TLK: CPT | Performed by: NURSE PRACTITIONER

## 2024-08-14 PROCEDURE — 3017F COLORECTAL CA SCREEN DOC REV: CPT | Performed by: NURSE PRACTITIONER

## 2024-08-14 PROCEDURE — 3074F SYST BP LT 130 MM HG: CPT | Performed by: NURSE PRACTITIONER

## 2024-08-14 ASSESSMENT — ENCOUNTER SYMPTOMS
ANAL BLEEDING: 0
CONSTIPATION: 0
DIARRHEA: 0
CHOKING: 0
VOMITING: 0
NAUSEA: 0
WHEEZING: 0
RESPIRATORY NEGATIVE: 1

## 2024-08-14 NOTE — TELEPHONE ENCOUNTER
Called to inform Ronit that her mammogram was negative.  Unable to leave a VM.  Copy sent to PCP    Faby Ferrer (Terrie), RN, MSN, APRN-CNP, AOCNP  Advanced Oncology Certified Nurse Practitioner  Department of Breast Surgery  [facility] Breast [facility]/  [facility] in collaboration with Dr. Amber Zelaya/Dr. Deborah Pulido/Dr. Joel Ferrer APRN-CNP

## 2024-08-15 ENCOUNTER — TELEPHONE (OUTPATIENT)
Dept: BREAST CENTER | Age: 64
End: 2024-08-15

## 2024-08-15 NOTE — TELEPHONE ENCOUNTER
RN received a call from patient in regards to missed call from office. RN relayed NP message that breast imaging was negative. Patient verbalized understanding. Patient asked if referral was placed for her to have a colonoscopy and also to pain management. RN instructed patient that referral has been placed to  for her to have colonoscopy done. RN didn't see pain management referral. Patient requested I check with NP if she was going to place it for her like talked about in office or does she need to talk to her family doctor. RN verbalized understanding and advised message would be sent to NP.       Electronically signed by Sugar Epps RN on 8/15/24 at 8:35 AM EDT

## 2024-08-15 NOTE — TELEPHONE ENCOUNTER
RN contacted patient and notified that she would need to contact her PCP for a referral to pain management. Patient verbalized understanding.       Electronically signed by Sugar Epps RN on 8/15/24 at 9:34 AM EDT

## 2024-09-13 ENCOUNTER — TELEPHONE (OUTPATIENT)
Dept: SURGERY | Age: 64
End: 2024-09-13

## 2024-09-13 ENCOUNTER — INITIAL CONSULT (OUTPATIENT)
Dept: SURGERY | Age: 64
End: 2024-09-13
Payer: MEDICARE

## 2024-09-13 VITALS
SYSTOLIC BLOOD PRESSURE: 127 MMHG | DIASTOLIC BLOOD PRESSURE: 72 MMHG | BODY MASS INDEX: 29.47 KG/M2 | HEIGHT: 69 IN | HEART RATE: 69 BPM | TEMPERATURE: 97.4 F | WEIGHT: 199 LBS

## 2024-09-13 DIAGNOSIS — K90.9 DIARRHEA DUE TO MALABSORPTION: Primary | ICD-10-CM

## 2024-09-13 DIAGNOSIS — K90.9 CHRONIC STEATORRHEA: ICD-10-CM

## 2024-09-13 DIAGNOSIS — R19.7 DIARRHEA DUE TO MALABSORPTION: Primary | ICD-10-CM

## 2024-09-13 PROCEDURE — 99214 OFFICE O/P EST MOD 30 MIN: CPT | Performed by: SURGERY

## 2024-09-13 PROCEDURE — 3017F COLORECTAL CA SCREEN DOC REV: CPT | Performed by: SURGERY

## 2024-09-13 PROCEDURE — 3078F DIAST BP <80 MM HG: CPT | Performed by: SURGERY

## 2024-09-13 PROCEDURE — G8427 DOCREV CUR MEDS BY ELIG CLIN: HCPCS | Performed by: SURGERY

## 2024-09-13 PROCEDURE — 3074F SYST BP LT 130 MM HG: CPT | Performed by: SURGERY

## 2024-09-13 PROCEDURE — G8417 CALC BMI ABV UP PARAM F/U: HCPCS | Performed by: SURGERY

## 2024-09-13 PROCEDURE — 4004F PT TOBACCO SCREEN RCVD TLK: CPT | Performed by: SURGERY

## 2024-09-13 RX ORDER — SODIUM CHLORIDE 0.9 % (FLUSH) 0.9 %
5-40 SYRINGE (ML) INJECTION PRN
OUTPATIENT
Start: 2024-09-13

## 2024-09-13 RX ORDER — SODIUM CHLORIDE 9 MG/ML
INJECTION, SOLUTION INTRAVENOUS PRN
OUTPATIENT
Start: 2024-09-13

## 2024-09-13 RX ORDER — SODIUM CHLORIDE 0.9 % (FLUSH) 0.9 %
5-40 SYRINGE (ML) INJECTION EVERY 12 HOURS SCHEDULED
OUTPATIENT
Start: 2024-09-13

## 2024-09-13 RX ORDER — SODIUM CHLORIDE 9 MG/ML
INJECTION, SOLUTION INTRAVENOUS CONTINUOUS
OUTPATIENT
Start: 2024-09-13

## 2024-10-07 ENCOUNTER — TELEPHONE (OUTPATIENT)
Dept: SURGERY | Age: 64
End: 2024-10-07

## 2024-10-07 NOTE — TELEPHONE ENCOUNTER
Patient called to reschedsule 10/9/24 Colonoscopy to 11/5/24. Same instructons given. Follow up also rescheduled to 11/22/24.

## 2024-10-09 RX ORDER — FUROSEMIDE 20 MG
TABLET ORAL
Qty: 30 TABLET | Refills: 0 | Status: SHIPPED | OUTPATIENT
Start: 2024-10-09

## 2024-11-13 ENCOUNTER — TELEPHONE (OUTPATIENT)
Dept: SURGERY | Age: 64
End: 2024-11-13

## 2024-11-13 PROBLEM — K90.9 MALABSORPTION: Status: ACTIVE | Noted: 2024-11-13

## 2024-11-13 NOTE — TELEPHONE ENCOUNTER
Per Dr. Anne, patient scheduled for Colonoscopy at [facility] on 25. Surgery scheduling notified, surgeons calendar updated. Follow up appointment scheduled.  Mag. Citrate/Miralax prep instructions given. ( Spoke w/ patient and mailed instructions.)     Prior Authorization Form:      DEMOGRAPHICS:                     Patient Name:  Oliva Corral  Patient :  1960            Insurance:  Payor: [facility] MEDICARE / Plan: UNITED[employer] DUAL COMPLETE / Product Type: *No Product type* /   Insurance ID Number:    Payer/Plan Subscr  Sex Relation Sub. Ins. ID Effective Group Num   1. [facility] MEDICARE * OLIVA CORRAL 1960 Female Self [id number]                                   [address]   2. [place]ICAID [place] -* OLIVA CORRAL 1960 Female Self [id number]                                    [address]         DIAGNOSIS & PROCEDURE:                       Procedure/Operation: Colonoscopy        CPT Code: 18392    Diagnosis:  Diarrhea due to malabsorption    ICD10 Code: K90.9  R19.7    Location:  [facility]    Surgeon:  FLOR    SCHEDULING INFORMATION:                          Date: 25    Time: TBD              Anesthesia:  MAC/TIVA                                                       Status:  Outpatient        Special Comments:         Electronically signed by Mayda Sheets MA on 2024 at 2:27 PM

## 2024-12-05 RX ORDER — FUROSEMIDE 20 MG/1
TABLET ORAL
Qty: 30 TABLET | Refills: 0 | Status: SHIPPED | OUTPATIENT
Start: 2024-12-05

## 2024-12-09 RX ORDER — ANASTROZOLE 1 MG/1
1 TABLET ORAL DAILY
Qty: 90 TABLET | Refills: 1 | Status: SHIPPED | OUTPATIENT
Start: 2024-12-09

## 2025-01-31 ENCOUNTER — TELEPHONE (OUTPATIENT)
Dept: SURGERY | Age: 65
End: 2025-01-31

## 2025-01-31 NOTE — TELEPHONE ENCOUNTER
Patient called and LVM that she needed to cancel Colonoscopy on 2/4/25 due to family member being in hospital. Attempted to return call, has VM not set up, unable to leave message.

## 2025-02-21 NOTE — PROGRESS NOTES
No mass, lacerations, deformity, swelling, tenderness or edema.   Breasts:     Breasts are symmetrical.      Right: No inverted nipple, mass, nipple discharge, skin change or tenderness.      Left: No inverted nipple, mass, nipple discharge, skin change or tenderness.          Comments: Right breast exam is stable and without secondary evidence of disease.  Abdominal:      General: There is no distension.      Palpations: Abdomen is soft.      Tenderness: There is no abdominal tenderness. There is no guarding or rebound.   Musculoskeletal:         General: No tenderness or deformity. Normal range of motion.      Right shoulder: Normal.      Left shoulder: Normal.      Cervical back: Normal range of motion and neck supple.   Lymphadenopathy:      Cervical: No cervical adenopathy.      Right cervical: No superficial, deep or posterior cervical adenopathy.     Left cervical: No superficial, deep or posterior cervical adenopathy.      Upper Body:      Right upper body: No pectoral adenopathy.      Left upper body: No pectoral adenopathy.   Skin:     General: Skin is warm and dry.      Coloration: Skin is not pale.      Findings: No erythema or rash.      Comments: Chronic scarring of the upper torso r/t history of bedbugs.     Neurological:      Mental Status: She is alert and oriented to person, place, and time.      Coordination: Coordination normal.   Psychiatric:         Behavior: Behavior normal.         Thought Content: Thought content normal.         Judgment: Judgment normal.        Assessment:    Ronit is a 64 y.o. pleasant female who underwent a left mass, 2 o'clock position, 8 cm's from the nipple breast biopsy:    05/31/2019 left breast biopsy invasive ductal carcinoma admixed with ductal carcinoma in situ with microcalcifications.  Serum markers: ER positive at 100%; HI positive at 70%; HER-2/leslie negative disease.    10/18/2019 Post Needle localized left lumpectomy with sentinel lymph node excision per

## 2025-03-11 ENCOUNTER — HOSPITAL ENCOUNTER (OUTPATIENT)
Age: 65
Setting detail: OUTPATIENT SURGERY
Discharge: HOME | End: 2025-03-11
Attending: SURGERY | Admitting: SURGERY
Payer: MEDICARE

## 2025-03-11 ENCOUNTER — ANESTHESIA EVENT (OUTPATIENT)
Dept: ENDOSCOPY | Age: 65
End: 2025-03-11
Payer: MEDICARE

## 2025-03-11 ENCOUNTER — ANESTHESIA (OUTPATIENT)
Dept: ENDOSCOPY | Age: 65
End: 2025-03-11
Payer: MEDICARE

## 2025-03-11 VITALS
HEIGHT: 69 IN | HEART RATE: 63 BPM | SYSTOLIC BLOOD PRESSURE: 148 MMHG | WEIGHT: 201 LBS | RESPIRATION RATE: 16 BRPM | OXYGEN SATURATION: 97 % | DIASTOLIC BLOOD PRESSURE: 93 MMHG | TEMPERATURE: 97.6 F | BODY MASS INDEX: 29.77 KG/M2

## 2025-03-11 DIAGNOSIS — R19.7 DIARRHEA: ICD-10-CM

## 2025-03-11 DIAGNOSIS — K90.9 MALABSORPTION: ICD-10-CM

## 2025-03-11 PROCEDURE — 2580000003 HC RX 258: Performed by: SURGERY

## 2025-03-11 PROCEDURE — 7100000010 HC PHASE II RECOVERY - FIRST 15 MIN: Performed by: SURGERY

## 2025-03-11 PROCEDURE — 3700000001 HC ADD 15 MINUTES (ANESTHESIA): Performed by: SURGERY

## 2025-03-11 PROCEDURE — 6360000002 HC RX W HCPCS: Performed by: NURSE ANESTHETIST, CERTIFIED REGISTERED

## 2025-03-11 PROCEDURE — 3700000000 HC ANESTHESIA ATTENDED CARE: Performed by: SURGERY

## 2025-03-11 PROCEDURE — 2709999900 HC NON-CHARGEABLE SUPPLY: Performed by: SURGERY

## 2025-03-11 PROCEDURE — 3609010600 HC COLONOSCOPY POLYPECTOMY SNARE/COLD BIOPSY: Performed by: SURGERY

## 2025-03-11 PROCEDURE — 45380 COLONOSCOPY AND BIOPSY: CPT | Performed by: SURGERY

## 2025-03-11 PROCEDURE — 7100000011 HC PHASE II RECOVERY - ADDTL 15 MIN: Performed by: SURGERY

## 2025-03-11 PROCEDURE — 88305 TISSUE EXAM BY PATHOLOGIST: CPT

## 2025-03-11 RX ORDER — SODIUM CHLORIDE 0.9 % (FLUSH) 0.9 %
5-40 SYRINGE (ML) INJECTION EVERY 12 HOURS SCHEDULED
Status: DISCONTINUED | OUTPATIENT
Start: 2025-03-11 | End: 2025-03-11 | Stop reason: HOSPADM

## 2025-03-11 RX ORDER — SODIUM CHLORIDE 9 MG/ML
INJECTION, SOLUTION INTRAVENOUS PRN
Status: DISCONTINUED | OUTPATIENT
Start: 2025-03-11 | End: 2025-03-11 | Stop reason: HOSPADM

## 2025-03-11 RX ORDER — SODIUM CHLORIDE 9 MG/ML
INJECTION, SOLUTION INTRAVENOUS CONTINUOUS
Status: DISCONTINUED | OUTPATIENT
Start: 2025-03-11 | End: 2025-03-11 | Stop reason: HOSPADM

## 2025-03-11 RX ORDER — LIDOCAINE HYDROCHLORIDE 20 MG/ML
INJECTION, SOLUTION INFILTRATION; PERINEURAL
Status: DISCONTINUED | OUTPATIENT
Start: 2025-03-11 | End: 2025-03-11 | Stop reason: SDUPTHER

## 2025-03-11 RX ORDER — PROPOFOL 10 MG/ML
INJECTION, EMULSION INTRAVENOUS
Status: DISCONTINUED | OUTPATIENT
Start: 2025-03-11 | End: 2025-03-11 | Stop reason: SDUPTHER

## 2025-03-11 RX ORDER — FENTANYL CITRATE 50 UG/ML
INJECTION, SOLUTION INTRAMUSCULAR; INTRAVENOUS
Status: DISCONTINUED | OUTPATIENT
Start: 2025-03-11 | End: 2025-03-11 | Stop reason: SDUPTHER

## 2025-03-11 RX ORDER — LABETALOL HYDROCHLORIDE 5 MG/ML
INJECTION, SOLUTION INTRAVENOUS
Status: DISCONTINUED | OUTPATIENT
Start: 2025-03-11 | End: 2025-03-11 | Stop reason: SDUPTHER

## 2025-03-11 RX ORDER — SODIUM CHLORIDE 0.9 % (FLUSH) 0.9 %
5-40 SYRINGE (ML) INJECTION PRN
Status: DISCONTINUED | OUTPATIENT
Start: 2025-03-11 | End: 2025-03-11 | Stop reason: HOSPADM

## 2025-03-11 RX ORDER — MIDAZOLAM HYDROCHLORIDE 1 MG/ML
INJECTION, SOLUTION INTRAMUSCULAR; INTRAVENOUS
Status: DISCONTINUED | OUTPATIENT
Start: 2025-03-11 | End: 2025-03-11 | Stop reason: SDUPTHER

## 2025-03-11 RX ADMIN — LABETALOL HYDROCHLORIDE 10 MG: 5 INJECTION INTRAVENOUS at 12:55

## 2025-03-11 RX ADMIN — MIDAZOLAM 2 MG: 1 INJECTION INTRAMUSCULAR; INTRAVENOUS at 12:36

## 2025-03-11 RX ADMIN — PROPOFOL 50 MG: 10 INJECTION, EMULSION INTRAVENOUS at 12:39

## 2025-03-11 RX ADMIN — FENTANYL CITRATE 50 MCG: 50 INJECTION, SOLUTION INTRAMUSCULAR; INTRAVENOUS at 12:50

## 2025-03-11 RX ADMIN — LIDOCAINE HYDROCHLORIDE 100 MG: 20 INJECTION, SOLUTION INFILTRATION; PERINEURAL at 12:39

## 2025-03-11 RX ADMIN — SODIUM CHLORIDE: 9 INJECTION, SOLUTION INTRAVENOUS at 10:10

## 2025-03-11 RX ADMIN — FENTANYL CITRATE 50 MCG: 50 INJECTION, SOLUTION INTRAMUSCULAR; INTRAVENOUS at 12:36

## 2025-03-11 ASSESSMENT — PAIN - FUNCTIONAL ASSESSMENT
PAIN_FUNCTIONAL_ASSESSMENT: NONE - DENIES PAIN
PAIN_FUNCTIONAL_ASSESSMENT: 0-10
PAIN_FUNCTIONAL_ASSESSMENT: NONE - DENIES PAIN

## 2025-03-11 ASSESSMENT — ENCOUNTER SYMPTOMS: SHORTNESS OF BREATH: 1

## 2025-03-11 ASSESSMENT — LIFESTYLE VARIABLES: SMOKING_STATUS: 1

## 2025-03-11 NOTE — ANESTHESIA PRE PROCEDURE
Irritable bowel syndrome with both constipation and diarrhea  Melena  .   Endo/Other:    (+) : arthritis: OA., malignancy/cancer (Breast, LEFT).                 Abdominal:             Vascular: negative vascular ROS.         Other Findings:         Anesthesia Plan      MAC     ASA 3       Induction: intravenous.      Anesthetic plan and risks discussed with patient.      Plan discussed with CRNA.                Khang Zimmer MD   3/11/2025      Chart Review:  Chart reviewed on March 11, 2025 at 11:21 AM by BRAD Horton CRNA.  Above represents information available via shared medical record including previous anesthesia history, drug and allergy history.  This does include physical examination of patient.  BRAD Horton CRNA

## 2025-03-11 NOTE — PROGRESS NOTES
SBAR form completed and placed on chart. Chart with patient in transit.   
call [facility]ARE if you have any further questions.   Pre Admit Testing           [phone]     [facility] [phone]

## 2025-03-11 NOTE — OP NOTE
Ronit JONES JADENjeff  [facility]    Operative Report    DATE OF PROCEDURE: 3/11/2025    SURGEON: Dr. Daniel Anne M.D.     Surgical Assistant: Hanny Negrete RN     PREOPERATIVE DIAGNOSES:   Constipation  diarrhea    POSTOPERATIVE DIAGNOSES:   3/11/2025 Colonoscopy to the cecum with biopsy 4 mm ascending polyp and three 3 mm rectal polyps    OPERATION:   Colonoscopy to the cecum  with biopsy removal polyps    ANESTHESIA: LMAC.  BLOOD LOSS: none  SPECIMEN: polyps    History and consent:  This is a 64 y.o. year old female who needs to have a colonoscopy.  I have discussed with the patient the indication, alternatives, and the possible risks and/or complications of the colonoscopy and the anesthesia. The patient appears to understand and agrees to proceed. Laxative was given two days ago to start the bowels moving, then a Myralax bowel prep was done yesterday until the bowels were clear.    Monitoring and Safety:    Anaesthesia monitored vital signs, BP cuff, pulse oximetry, cardiac monitor and level of consciousness until recovered.     OPERATIONS:  The patient was placed on the table and sedated by anaesthesia. An anal exam was done, no hemorrhoidal tags were present, no thrombosis or bleeding. The lubricated scope was passed into the rectum, three 3 mm polyps were found and removed with multiple bites of the biopsy forceps.  The scope was passed up through the sigmoid which had extensive diverticula. The scope was passed around to the cecum. A 4 mm polyp was found and removed from the ascending colon. The scope was slowly withdrawn, each area was examined again on the way out.  No other polyps were seen.  The scope was removed. The patient tolerated the procedure well.     Complications: none    Plan:  Keep the bowels soft and moving daily with fiber. Will need a repeat colonoscopy in 3 years or sooner depending on pathology.     Electronically signed Dr. Daniel Anne Jr., M.D.

## 2025-03-11 NOTE — H&P
Ronit Calero  3/11/2025  [facility]              History and Physical    Chief Complaint: irritable bowel, diarrhea, incontinence of stool    Ronit Calero is a 64 y.o., female, with long history of irritable bowel, eating less due to the diarrhea and is losing weight, CT abdomen and chest xray being worked up at [facility], no results available. She had a breast cancer surgery and radiation 3 yrs ago, she says they thinks they got it all. She has some swallowing difficulty and has a 3 cm hiatal hernia but no acid reflux as long as she takes the Protonix. Her Mom had pancreas cancer.     Past Medical History:   Diagnosis Date    Anxiety     Arthritis     Back pain     Breast cancer (HCC)     lt breast    Cancer (HCC)     breast    Carpal tunnel syndrome     bilateral diag by dr. marie    Chronic obstructive pulmonary disease [facility]COPD) ([facility]) 2017    Depression     Diverticulosis     Hip pain, bilateral     Right worse than left    History of therapeutic radiation     Hypertension     Irritable bowel syndrome with both constipation and diarrhea 2023    Moderate obesity 2017    Osteoarthritis of right hip 2012    Tobacco abuse      Past Surgical History:   Procedure Laterality Date    BREAST BIOPSY Left 10/18/2019    LEFT BREAST NEEDLE LOCALIZED LUMPECTOMY BLUE DYE INJECTION LEFT AXILL.  SENTINEL LYMPH  NODE EXCISION performed by Amber Zelaya MD at [facility] OR    COLONOSCOPY  13    INDUCED   age 17    MT COLONOSCOPY FLX DX W/COLLJ SPEC WHEN PFRMD N/A 2018    COLONOSCOPY performed by Vijay Tuttle MD at [facility] ENDOSCOPY    UPPER GASTROINTESTINAL ENDOSCOPY  2018    bx done antrum    UPPER GASTROINTESTINAL ENDOSCOPY N/A 2018    EGD BIOPSY performed by Vijay Tuttle MD at [facility] ENDOSCOPY    UPPER GASTROINTESTINAL ENDOSCOPY N/A 2021    EGD BIOPSY performed by Daniel Anne MD at [facility] ENDOSCOPY       Home Medications  Prior to  Visit Medications    Medication Sig Taking? Authorizing Provider   anastrozole (ARIMIDEX) 1 MG tablet TAKE ONE TABLET BY MOUTH DAILY Yes Faby Ferrer APRN - CNP   venlafaxine (EFFEXOR XR) 150 MG extended release capsule Take 1 capsule by mouth daily Yes Aman Richmond MD   furosemide (LASIX) 20 MG tablet TAKE ONE TABLET BY MOUTH DAILY AT 9 AM  Patient not taking: Reported on 3/7/2025  Jimmy Welch MD   hydrOXYzine pamoate (VISTARIL) 50 MG capsule Take 1 capsule by mouth daily as needed  Aman Richmond MD   acetaminophen (TYLENOL) 500 MG tablet Take 1 tablet by mouth every 6 hours as needed for Pain  Aman Richmond MD   Glucos-Chondroit-Hyaluron-MSM (GLUCOSAMINE CHONDROITIN JOINT PO) Take by mouth daily  Patient not taking: Reported on 9/13/2024  Aman Richmond MD   omeprazole (PRILOSEC) 20 MG delayed release capsule Take 1 capsule by mouth 2 times daily  Vijay Tuttle MD   BREO ELLIPTA 100-25 MCG/INH AEPB inhaler Inhale 1 puff into the lungs daily  Patient not taking: Reported on 3/7/2025  Aman Richmond MD   VENTOLIN  (90 Base) MCG/ACT inhaler Inhale 1 puff into the lungs every 4 hours as needed  Aman Richmond MD       Allergies: Latex, Cephalosporins, Other, and Penicillins   Social History:   TOBACCO:   reports that she has been smoking cigarettes. She has a 35 pack-year smoking history. She has never used smokeless tobacco.  All smokers must join the free smoking cessation program and stop smoking for 3 months before having any Bariatric surgery.  ETOH:    reports current alcohol use.       Problem Relation Age of Onset    Diabetes Mother     High Blood Pressure Mother     Cancer Mother 71        pancreatic    Arthritis Father     High Blood Pressure Father     Heart Disease Father         anurism 49    Cancer Brother         prostate    Breast Cancer Maternal Grandmother     Cancer Maternal Grandmother 70        breast       Review of

## 2025-03-11 NOTE — ANESTHESIA POSTPROCEDURE EVALUATION
Department of Anesthesiology  Postprocedure Note    Patient: Ronit Calero  MRN: [id number]  YOB: 1960  Date of evaluation: 3/11/2025    Procedure Summary       Date: 03/11/25 Room / Location: [place] [address] / [facility]    Anesthesia Start: 1233 Anesthesia Stop: 1308    Procedure: COLONOSCOPY POLYPECTOMY SNARE/BIOPSY Diagnosis:       Diarrhea      Malabsorption      (Diarrhea [R19.7])      (Malabsorption [K90.9])    Surgeons: Daniel Anne MD Responsible Provider: Khang Zimmer MD    Anesthesia Type: MAC ASA Status: 3            Anesthesia Type: No value filed.    Franco Phase I: Franco Score: 10    Franco Phase II: Franco Score: 10    Anesthesia Post Evaluation    Patient location during evaluation: PACU  Patient participation: complete - patient participated  Level of consciousness: awake  Airway patency: patent  Nausea & Vomiting: no nausea and no vomiting  Cardiovascular status: hemodynamically stable  Respiratory status: acceptable  Hydration status: stable  Pain management: adequate    No notable events documented.

## 2025-03-12 ENCOUNTER — OFFICE VISIT (OUTPATIENT)
Dept: BREAST CENTER | Age: 65
End: 2025-03-12
Payer: MEDICARE

## 2025-03-12 VITALS
SYSTOLIC BLOOD PRESSURE: 128 MMHG | OXYGEN SATURATION: 100 % | HEART RATE: 75 BPM | TEMPERATURE: 97.6 F | RESPIRATION RATE: 22 BRPM | BODY MASS INDEX: 29.47 KG/M2 | HEIGHT: 69 IN | WEIGHT: 199 LBS | DIASTOLIC BLOOD PRESSURE: 82 MMHG

## 2025-03-12 DIAGNOSIS — C50.912 INVASIVE DUCTAL CARCINOMA OF LEFT BREAST: ICD-10-CM

## 2025-03-12 DIAGNOSIS — Z12.31 VISIT FOR SCREENING MAMMOGRAM: Primary | ICD-10-CM

## 2025-03-12 DIAGNOSIS — C50.912 INVASIVE DUCTAL CARCINOMA OF LEFT BREAST (HCC): ICD-10-CM

## 2025-03-12 LAB
ALBUMIN: 4 G/DL (ref 3.5–5.2)
ALP BLD-CCNC: 142 U/L (ref 35–104)
ALT SERPL-CCNC: 16 U/L (ref 0–32)
ANION GAP SERPL CALCULATED.3IONS-SCNC: 12 MMOL/L (ref 7–16)
AST SERPL-CCNC: 17 U/L (ref 0–31)
BASOPHILS ABSOLUTE: 0.06 K/UL (ref 0–0.2)
BASOPHILS RELATIVE PERCENT: 1 % (ref 0–2)
BILIRUB SERPL-MCNC: 0.3 MG/DL (ref 0–1.2)
BUN BLDV-MCNC: 14 MG/DL (ref 6–23)
CALCIUM SERPL-MCNC: 8.8 MG/DL (ref 8.6–10.2)
CHLORIDE BLD-SCNC: 106 MMOL/L (ref 98–107)
CO2: 21 MMOL/L (ref 22–29)
CREAT SERPL-MCNC: 0.7 MG/DL (ref 0.5–1)
EOSINOPHILS ABSOLUTE: 0.06 K/UL (ref 0.05–0.5)
EOSINOPHILS RELATIVE PERCENT: 1 % (ref 0–6)
GFR, ESTIMATED: >90 ML/MIN/1.73M2
GLUCOSE BLD-MCNC: 89 MG/DL (ref 74–99)
HCT VFR BLD CALC: 41.1 % (ref 34–48)
HEMOGLOBIN: 13.4 G/DL (ref 11.5–15.5)
IMMATURE GRANULOCYTES %: 0 % (ref 0–5)
IMMATURE GRANULOCYTES ABSOLUTE: <0.03 K/UL (ref 0–0.58)
LYMPHOCYTES ABSOLUTE: 1.74 K/UL (ref 1.5–4)
LYMPHOCYTES RELATIVE PERCENT: 33 % (ref 20–42)
MCH RBC QN AUTO: 30.6 PG (ref 26–35)
MCHC RBC AUTO-ENTMCNC: 32.6 G/DL (ref 32–34.5)
MCV RBC AUTO: 93.8 FL (ref 80–99.9)
MONOCYTES ABSOLUTE: 0.54 K/UL (ref 0.1–0.95)
MONOCYTES RELATIVE PERCENT: 10 % (ref 2–12)
NEUTROPHILS ABSOLUTE: 2.88 K/UL (ref 1.8–7.3)
NEUTROPHILS RELATIVE PERCENT: 55 % (ref 43–80)
PDW BLD-RTO: 12.6 % (ref 11.5–15)
PLATELET # BLD: 311 K/UL (ref 130–450)
PMV BLD AUTO: 9.5 FL (ref 7–12)
POTASSIUM SERPL-SCNC: 4.1 MMOL/L (ref 3.5–5)
RBC # BLD: 4.38 M/UL (ref 3.5–5.5)
SODIUM BLD-SCNC: 139 MMOL/L (ref 132–146)
TOTAL PROTEIN: 6.6 G/DL (ref 6.4–8.3)
WBC # BLD: 5.3 K/UL (ref 4.5–11.5)

## 2025-03-12 PROCEDURE — G8417 CALC BMI ABV UP PARAM F/U: HCPCS | Performed by: NURSE PRACTITIONER

## 2025-03-12 PROCEDURE — 3074F SYST BP LT 130 MM HG: CPT | Performed by: NURSE PRACTITIONER

## 2025-03-12 PROCEDURE — 99214 OFFICE O/P EST MOD 30 MIN: CPT | Performed by: NURSE PRACTITIONER

## 2025-03-12 PROCEDURE — G8427 DOCREV CUR MEDS BY ELIG CLIN: HCPCS | Performed by: NURSE PRACTITIONER

## 2025-03-12 PROCEDURE — 3079F DIAST BP 80-89 MM HG: CPT | Performed by: NURSE PRACTITIONER

## 2025-03-12 PROCEDURE — 99213 OFFICE O/P EST LOW 20 MIN: CPT | Performed by: NURSE PRACTITIONER

## 2025-03-12 PROCEDURE — 3017F COLORECTAL CA SCREEN DOC REV: CPT | Performed by: NURSE PRACTITIONER

## 2025-03-12 PROCEDURE — 4004F PT TOBACCO SCREEN RCVD TLK: CPT | Performed by: NURSE PRACTITIONER

## 2025-03-12 PROCEDURE — 36415 COLL VENOUS BLD VENIPUNCTURE: CPT | Performed by: NURSE PRACTITIONER

## 2025-03-12 RX ORDER — DOXYCYCLINE HYCLATE 100 MG
100 TABLET ORAL 2 TIMES DAILY
Qty: 20 TABLET | Refills: 0 | Status: SHIPPED | OUTPATIENT
Start: 2025-03-12 | End: 2025-03-22

## 2025-03-12 ASSESSMENT — ENCOUNTER SYMPTOMS
COUGH: 1
RECTAL PAIN: 0
BLOOD IN STOOL: 0

## 2025-03-17 LAB — SURGICAL PATHOLOGY REPORT: NORMAL

## 2025-03-28 ENCOUNTER — OFFICE VISIT (OUTPATIENT)
Dept: SURGERY | Age: 65
End: 2025-03-28
Payer: MEDICARE

## 2025-03-28 VITALS
RESPIRATION RATE: 18 BRPM | HEART RATE: 76 BPM | HEIGHT: 69 IN | SYSTOLIC BLOOD PRESSURE: 151 MMHG | BODY MASS INDEX: 29.47 KG/M2 | TEMPERATURE: 97.6 F | WEIGHT: 199 LBS | DIASTOLIC BLOOD PRESSURE: 81 MMHG

## 2025-03-28 DIAGNOSIS — D12.5 ADENOMATOUS POLYP OF SIGMOID COLON: Primary | ICD-10-CM

## 2025-03-28 PROCEDURE — 3077F SYST BP >= 140 MM HG: CPT | Performed by: SURGERY

## 2025-03-28 PROCEDURE — G8417 CALC BMI ABV UP PARAM F/U: HCPCS | Performed by: SURGERY

## 2025-03-28 PROCEDURE — 3017F COLORECTAL CA SCREEN DOC REV: CPT | Performed by: SURGERY

## 2025-03-28 PROCEDURE — G8427 DOCREV CUR MEDS BY ELIG CLIN: HCPCS | Performed by: SURGERY

## 2025-03-28 PROCEDURE — 3079F DIAST BP 80-89 MM HG: CPT | Performed by: SURGERY

## 2025-03-28 PROCEDURE — 99213 OFFICE O/P EST LOW 20 MIN: CPT | Performed by: SURGERY

## 2025-03-28 PROCEDURE — 4004F PT TOBACCO SCREEN RCVD TLK: CPT | Performed by: SURGERY

## 2025-03-28 NOTE — PROGRESS NOTES
Ronit Calero  3/28/2025  [facility] office post op visit    Ronit Calero is a 64 y.o., female, 3/11/2025 Colonoscopy to the cecum with biopsy 4 mm ascending colon polyp which was a tubular adenoma and three 3 mm rectal polyps, path=tubular adenomas.    Long history of irritable bowel, eating less due to the diarrhea and is losing weight, CT abdomen and chest xray at [facility], no results available. She had a breast cancer surgery and radiation 3 yrs ago, she says they think they got it all. She has some swallowing difficulty and has a 3 cm hiatal hernia but no acid reflux as long as she takes the Protonix. Her Mom had pancreas cancer.     Past Medical History:   Diagnosis Date    Anxiety     Arthritis     Back pain     Breast cancer (HCC)     lt breast    Cancer (HCC)     breast    Carpal tunnel syndrome     bilateral diag by dr. marie    Chronic obstructive pulmonary disease (COPD) ([facility]) 2017    Depression     Diverticulosis     Hip pain, bilateral     Right worse than left    History of therapeutic radiation     Hypertension     Irritable bowel syndrome with both constipation and diarrhea 2023    Moderate obesity 2017    Osteoarthritis of right hip 2012    Tobacco abuse      Past Surgical History:   Procedure Laterality Date    BREAST BIOPSY Left 10/18/2019    LEFT BREAST NEEDLE LOCALIZED LUMPECTOMY BLUE DYE INJECTION LEFT AXILL.  SENTINEL LYMPH  NODE EXCISION performed by Amber Zelaya MD at [facility] [place]    COLONOSCOPY  13    COLONOSCOPY N/A 3/11/2025    COLONOSCOPY POLYPECTOMY SNARE/BIOPSY performed by Daniel Anne MD at [facility] ENDOSCOPY    INDUCED   age 17    MD COLONOSCOPY FLX DX W/COLLJ SPEC WHEN PFRMD N/A 2018    COLONOSCOPY performed by Vijay Tuttle MD at [facility] ENDOSCOPY    UPPER GASTROINTESTINAL ENDOSCOPY  2018    bx done antrum    UPPER GASTROINTESTINAL ENDOSCOPY N/A 2018    EGD BIOPSY

## 2025-09-04 ENCOUNTER — TELEPHONE (OUTPATIENT)
Age: 65
End: 2025-09-04

## (undated) DEVICE — SPONGE GZ 4IN 4IN 4 PLY N WVN AVANT

## (undated) DEVICE — KENDALL 450 SERIES MONITORING FOAM ELECTRODE - RECTANGULAR SHAPE ( 3/PK): Brand: KENDALL

## (undated) DEVICE — GOWN ISOLATN REG YEL M WT MULTIPLY SIDETIE LEV 2

## (undated) DEVICE — MEDI-VAC NON-CONDUCTIVE SUCTION TUBING: Brand: CARDINAL HEALTH

## (undated) DEVICE — MARKER SURG MARGIN STD 6 CLR INK ASST CORR CLP

## (undated) DEVICE — Device: Brand: DEFENDO VALVE AND CONNECTOR KIT

## (undated) DEVICE — SPONGE LAP W18XL18IN WHT COT 4 PLY FLD STRUNG RADPQ DISP ST

## (undated) DEVICE — CONTROL SYRINGE LUER-LOCK TIP: Brand: MONOJECT

## (undated) DEVICE — PACK,UNIV, II AURORA: Brand: MEDLINE

## (undated) DEVICE — DRAPE THER FLUID WARMING 66X44 IN FLAT SLUSH DBL DISC ORS

## (undated) DEVICE — VALVE SUCTION AIR H2O HYDR H2O JET CONN STRL ORCA POD + DISP

## (undated) DEVICE — 6 X 9  1.75MIL 4-WALL LABGUARD: Brand: MINIGRIP COMMERCIAL LLC

## (undated) DEVICE — LUBRICANT SURG JELLY ST BACTER TUBE 4.25OZ

## (undated) DEVICE — Z CONVERTED USE 2715898 SWABSTICK MEDICATED W1.75XL6.5IN 1.6ML 3.15% CHG 70% ISO

## (undated) DEVICE — MEDI-VAC YANKAUER SUCTION HANDLE W/BULBOUS TIP: Brand: CARDINAL HEALTH

## (undated) DEVICE — FORCEPS BX L240CM JAW DIA2.8MM L CAP W/ NDL MIC MESH TOOTH

## (undated) DEVICE — CONTAINER SPEC COLL 960ML POLYPR TRIANG GRAD INTAKE/OUTPUT

## (undated) DEVICE — MASK,FACE,MAXFLUIDPROTECT,SHIELD/ERLPS: Brand: MEDLINE

## (undated) DEVICE — APPLIER LIG CLP M L11IN TI STR RNG HNDL FOR 20 CLP DISP

## (undated) DEVICE — KIT BEDSIDE REVITAL OX 500ML

## (undated) DEVICE — PATIENT RETURN ELECTRODE, SINGLE-USE, CONTACT QUALITY MONITORING, ADULT, WITH 9FT CORD, FOR PATIENTS WEIGING OVER 33LBS. (15KG): Brand: MEGADYNE

## (undated) DEVICE — PROBE GAM TRUNODE DISP EACH

## (undated) DEVICE — TUBING, SUCTION, 3/16" X 12', STRAIGHT: Brand: MEDLINE

## (undated) DEVICE — STRIP,CLOSURE,WOUND,MEDI-STRIP,1/2X4: Brand: MEDLINE

## (undated) DEVICE — SKIN AFFIX SURG ADHESIVE 72/CS 0.55ML: Brand: MEDLINE

## (undated) DEVICE — CHLORAPREP 26ML ORANGE

## (undated) DEVICE — 4-PORT MANIFOLD: Brand: NEPTUNE 2

## (undated) DEVICE — STANDARD HYPODERMIC NEEDLE,ALUMINUM HUB: Brand: MONOJECT

## (undated) DEVICE — SURGICAL PROCEDURE PACK BASIC

## (undated) DEVICE — SET INST MINOR PROCEDURE

## (undated) DEVICE — BLOCK BITE 60FR CAREGUARD

## (undated) DEVICE — Device

## (undated) DEVICE — GLOVE SURG L12IN SZ 65FNGR THK94MIL TRNSLUC YEL LTX

## (undated) DEVICE — PLASMABLADE PS210-030S 3.0S LOCK: Brand: PLASMABLADE™

## (undated) DEVICE — JELLY,LUBE,STERILE,FLIP TOP,TUBE,4-OZ: Brand: MEDLINE

## (undated) DEVICE — TOWEL,OR,DSP,ST,BLUE,STD,6/PK,12PK/CS: Brand: MEDLINE

## (undated) DEVICE — FORCEPS BX L240CM WRK CHN 2.8MM STD CAP W/ NDL MIC MESH

## (undated) DEVICE — ADAPTER CLEANING PORPOISE CLEANING

## (undated) DEVICE — GOWN,SIRUS,FABRNF,L,20/CS: Brand: MEDLINE

## (undated) DEVICE — GARMENT,MEDLINE,DVT,INT,CALF,MED, GEN2: Brand: MEDLINE